# Patient Record
Sex: MALE | Race: WHITE | NOT HISPANIC OR LATINO | Employment: STUDENT | URBAN - METROPOLITAN AREA
[De-identification: names, ages, dates, MRNs, and addresses within clinical notes are randomized per-mention and may not be internally consistent; named-entity substitution may affect disease eponyms.]

---
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
## 2018-01-02 ENCOUNTER — TRANSFERRED RECORDS (OUTPATIENT)
Dept: HEALTH INFORMATION MANAGEMENT | Facility: CLINIC | Age: 18
End: 2018-01-02

## 2018-01-25 ENCOUNTER — OFFICE VISIT (OUTPATIENT)
Dept: INTERNAL MEDICINE | Facility: CLINIC | Age: 18
End: 2018-01-25
Payer: COMMERCIAL

## 2018-01-25 VITALS
DIASTOLIC BLOOD PRESSURE: 80 MMHG | BODY MASS INDEX: 31.4 KG/M2 | WEIGHT: 212 LBS | HEIGHT: 69 IN | RESPIRATION RATE: 18 BRPM | HEART RATE: 67 BPM | TEMPERATURE: 99.4 F | OXYGEN SATURATION: 99 % | SYSTOLIC BLOOD PRESSURE: 102 MMHG

## 2018-01-25 DIAGNOSIS — F32.1 MODERATE MAJOR DEPRESSION (H): Chronic | ICD-10-CM

## 2018-01-25 DIAGNOSIS — F90.9 ATTENTION DEFICIT HYPERACTIVITY DISORDER (ADHD), UNSPECIFIED ADHD TYPE: Primary | Chronic | ICD-10-CM

## 2018-01-25 PROCEDURE — 99214 OFFICE O/P EST MOD 30 MIN: CPT | Performed by: INTERNAL MEDICINE

## 2018-01-25 ASSESSMENT — PAIN SCALES - GENERAL: PAINLEVEL: NO PAIN (0)

## 2018-01-25 NOTE — PROGRESS NOTES
SUBJECTIVE:   Klebo J Skjervold David is a 18 year old male who presents to clinic today for the following health issues:    ADHD -- He stopped taking vyvanse two months ago as it was not providing significant relief for his diagnosis of attention deficit and hyperactivity disorder. His focus has actually improved he feels since discontinuing medication. He relates he has gained weight since he discontinued the vyvanse. Patient is not monitoring caloric intake. He does not exercise outside of frequent walking at work. He would like to hold off on consultation with mental health/psychiatry to discuss other medication at this time.     Wt Readings from Last 10 Encounters:   01/25/18 96.2 kg (212 lb)   12/14/17 94.1 kg (207 lb 7.3 oz) (94 %)*   11/21/17 90.3 kg (199 lb) (92 %)*   08/15/17 86.6 kg (191 lb) (89 %)*   05/26/17 84.4 kg (186 lb) (86 %)*   03/15/17 80.5 kg (177 lb 7.5 oz) (81 %)*   02/17/17 81.6 kg (180 lb) (83 %)*   01/13/17 84.4 kg (186 lb) (87 %)*   12/15/16 82.6 kg (182 lb 1.6 oz) (85 %)*   06/16/16 81.6 kg (180 lb) (85 %)*     * Growth percentiles are based on [facility] 2-20 Years data.     Depression -- he continues to take sertraline 50 mg. He still experiences difficulty sleeping. He intermittently takes melatonin as needed for sleep difficulties.     PHQ-9 SCORE 8/15/2017 11/2/2017 11/21/2017   Total Score - - -   Total Score 8 14 11       Problem list and histories reviewed & adjusted, as indicated.  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, ou (PQ)     Sleep disorder, circadian, delayed sleep phase type     Past Surgical History:   Procedure Laterality Date     CIRCUMCISION       EYE SURGERY  2008    Laser  retinas both eyes     frenulectomy       GENITOURINARY SURGERY  2001    Circumcision     H ARGON LASER FOR RETINAL TEAR      both eyes (PQ)     HERNIA REPAIR, INGUINAL RT/LT      Hernia Repair, Femoral RT/LT     PE TUBES       RETINAL REATTACHMENT       supranumery tooth extraction       TONSILLECTOMY & ADENOIDECTOMY         Social History   Substance Use Topics     Smoking status: Current Every Day Smoker     Packs/day: 1.00     Types: Cigarettes     Smokeless tobacco: Never Used     Alcohol use No     Family History   Problem Relation Age of Onset     Allergies Mother      Obesity Mother      Thyroid Disease Mother      DIABETES Mother      Hypertension Mother      Depression/Anxiety Mother      Anesthesia Reaction Mother      Thyroid Disease Mother      Asthma Mother      Known Genetic Syndrome Mother      Sticklers     Thyroid Disease Maternal Grandmother      Hypertension Maternal Grandmother      Thyroid Disease Maternal Grandmother      Known Genetic Syndrome Maternal Grandmother      Sticklers     Alzheimer Disease Paternal Grandfather      DIABETES Paternal Grandfather      Chemical Addiction Paternal Grandfather      DIABETES Father      CANCER Maternal Grandfather      DIABETES Other      Thyroid Disease Other      CEREBROVASCULAR DISEASE Other      CANCER Other      Glaucoma Other      Macular Degeneration Other      DIABETES Other      Prostate Cancer Other      CEREBROVASCULAR DISEASE Other      Thyroid Disease Other      Known Genetic Syndrome Other      Sticklers     CEREBROVASCULAR DISEASE Other      Known Genetic Syndrome Other      Sticklers         Current Outpatient Prescriptions   Medication Sig Dispense Refill     sertraline (ZOLOFT) 50 MG tablet Take 1 tablet (50 mg) by mouth daily 90 tablet 1     melatonin 5 MG tablet Take 5 mg by mouth nightly as needed for sleep       cetirizine HCl 10 MG CAPS Take 1 capsule by mouth daily as needed Needs to be seen b/4 further refills for allergies 90  "capsule 4     Labs reviewed in EPIC    Reviewed and updated as needed this visit by clinical staff  Tobacco  Meds  Med Hx  Surg Hx  Fam Hx  Soc Hx      Reviewed and updated as needed this visit by Provider         ROS:  Constitutional, HEENT, cardiovascular, pulmonary, GI, , musculoskeletal, neuro, skin, endocrine and psych systems are negative, except as otherwise noted.    This document serves as a record of the services and decisions personally performed and made by Enrike Neal MD. It was created on his behalf by Cindy Watson, a trained medical scribe. The creation of this document is based on the provider's statements to the medical scribe.  Cindy Watson January 25, 2018 3:28 PM       OBJECTIVE:     /80 (BP Location: Left arm, Cuff Size: Adult Regular)  Pulse 67  Temp 99.4  F (37.4  C) (Oral)  Resp 18  Ht 1.74 m (5' 8.5\")  Wt 96.2 kg (212 lb)  SpO2 99%  BMI 31.77 kg/m2  Body mass index is 31.77 kg/(m^2).  GENERAL: healthy, alert and no distress, normal grooming and affect, answers all questions appropriately , talkative and appropriate   EYES: Eyes grossly normal to inspection, PERRL and conjunctivae and sclerae normal  NEURO: Normal strength and tone, mentation intact and speech normal  PSYCH: mentation appears normal, affect normal/bright    ASSESSMENT/PLAN:   BMI:   Estimated body mass index is 31.77 kg/(m^2) as calculated from the following:    Height as of this encounter: 1.74 m (5' 8.5\").    Weight as of this encounter: 96.2 kg (212 lb).   Weight management plan: Discussed healthy diet and exercise guidelines and patient will follow up in 3 months in clinic to re-evaluate.    (F90.9) Attention deficit hyperactivity disorder (ADHD), unspecified ADHD type  (primary encounter diagnosis)  Comment: stable without need for vyvanse. Focus and concentration have better managed since discontinuing vyvanse 1 month ago. He felt prior to discontinuation he was hyper focused. However, " he has gradually gained weight since. Discussed healthy diet and exercise. Consider dietician referral if he desires in the future.   Plan: Consider referral for psychiatry for further evaluation and discussion of other medication options, patient is not interested at this time     (F32.1) Moderate major depression (H)  Comment: Controlled. He continues to experience difficulty sleeping, recommended Melatonin and benadryl for relief.   Plan: Continue sertraline.     The information in this document, created by the medical scribe for me, accurately reflects the services I personally performed and the decisions made by me. I have reviewed and approved this document for accuracy prior to leaving the patient care area.  January 25, 2018 3:51 PM    Enrike Neal MD  [facility]

## 2018-01-25 NOTE — PATIENT INSTRUCTIONS
Melatonin between 3 to 5 milligrams one hour before sleep   And  Benadryl 25 to 50 milligrams one hour before sleep     [facility]    If you have any questions regarding to your visit please contact your care team:     Team Pink:   Clinic Hours Telephone Number   Internal Medicine:  Dr. Liya Garcia, NP       7am-7pm  Monday - Thursday   7am-5pm  Fridays  [phone]  (Appointment scheduling available 24/7)    Questions about your visit?  Team Line  [phone]   Urgent Care - [place] - 11am-9pm Monday-Friday Saturday-Sunday- 9am-5pm   [place] - 5pm-9pm Monday-Friday Saturday-Sunday- 9am-5pm  [phone] - [place]   [phone] - [place]       What options do I have for visits at the clinic other than the traditional office visit?  To expand how we care for you, many of our providers are utilizing electronic visits (e-visits) and telephone visits, when medically appropriate, for interactions with their patients rather than a visit in the clinic.   We also offer nurse visits for many medical concerns. Just like any other service, we will bill your insurance company for this type of visit based on time spent on the phone with your provider. Not all insurance companies cover these visits. Please check with your medical insurance if this type of visit is covered. You will be responsible for any charges that are not paid by your insurance.      E-visits via [employer]:  generally incur a $35.00 fee.  Telephone visits:  Time spent on the phone: *charged based on time that is spent on the phone in increments of 10 minutes. Estimated cost:   5-10 mins $30.00   11-20 mins. $59.00   21-30 mins. $85.00   Use [employer] (secure email communication and access to your chart) to send your primary care provider a message or make an appointment. Ask someone on your Team how to sign up for [employer].    For a Price Quote for your services, please  call our Consumer Price Line at [phone].    As always, Thank you for trusting us with your health care needs    Donald Iqbal

## 2018-01-25 NOTE — NURSING NOTE
"Chief Complaint   Patient presents with     Recheck Medication     Health Maintenance     Eye Exam       Initial /80 (BP Location: Left arm, Cuff Size: Adult Regular)  Pulse 67  Temp 99.4  F (37.4  C) (Oral)  Resp 18  Ht 5' 8.5\" (1.74 m)  Wt 212 lb (96.2 kg)  SpO2 99%  BMI 31.77 kg/m2 Estimated body mass index is 31.77 kg/(m^2) as calculated from the following:    Height as of this encounter: 5' 8.5\" (1.74 m).    Weight as of this encounter: 212 lb (96.2 kg).  Medication Reconciliation: complete       Sangeetha Magallon CMA    "

## 2018-02-23 ENCOUNTER — TELEPHONE (OUTPATIENT)
Dept: INTERNAL MEDICINE | Facility: CLINIC | Age: 18
End: 2018-02-23

## 2018-02-23 NOTE — TELEPHONE ENCOUNTER
Reason for Call:  Other call back    Detailed comments:  Patient calling with a cough. Lower chest pain. Please call and advise.     Phone Number Patient can be reached at: Home number on file [phone] (home)    Best Time:  Any     Can we leave a detailed message on this number? YES    Call taken on 2/23/2018 at 10:41 AM by Jalyn Martin

## 2018-02-23 NOTE — TELEPHONE ENCOUNTER
Spoke with pt and he sounded like he was in pain. He put his Mother on the line. Consent to communicate is on file. Symptoms started around Sunday and Monday and has progressively gotten worse. Today had a cough and trouble walking up the stairs. No fever. No wheezing. Can breathe, but hurts to take a full, deep breath. Short, shallow breath. Seems like he is working hard to breathe. Looks paler than normal. Scheduled an appt at 1130 with Val, then realized he was double booked. Huddled with Dr. Neal and he would like pt to go to ER as he may need a CT scan and this cannot be done here. If he comes here, he would just get sent to ER. Mother will bring pt to the ER.    Sandee Shin RN  [facility]

## 2018-05-15 ENCOUNTER — TELEPHONE (OUTPATIENT)
Dept: FAMILY MEDICINE | Facility: CLINIC | Age: 18
End: 2018-05-15

## 2018-05-15 ENCOUNTER — MYC MEDICAL ADVICE (OUTPATIENT)
Dept: INTERNAL MEDICINE | Facility: CLINIC | Age: 18
End: 2018-05-15

## 2018-05-30 ENCOUNTER — ALLIED HEALTH/NURSE VISIT (OUTPATIENT)
Dept: NURSING | Facility: CLINIC | Age: 18
End: 2018-05-30
Payer: COMMERCIAL

## 2018-05-30 DIAGNOSIS — Z00.00 PREVENTATIVE HEALTH CARE: Primary | ICD-10-CM

## 2018-05-30 PROCEDURE — 90651 9VHPV VACCINE 2/3 DOSE IM: CPT

## 2018-05-30 PROCEDURE — 90471 IMMUNIZATION ADMIN: CPT

## 2018-05-30 PROCEDURE — 99207 ZZC NO CHARGE LOS: CPT

## 2018-05-30 NOTE — NURSING NOTE
Prior to injection verified patient identity using patient's name and date of birth.  Due to injection administration, patient instructed to remain in clinic for 15 minutes  afterwards, and to report any adverse reaction to me immediately.    Screening Questionnaire for Adult Immunization    Are you sick today?   No   Do you have allergies to medications, food, a vaccine component or latex?   No   Have you ever had a serious reaction after receiving a vaccination?   No   Do you have a long-term health problem with heart disease, lung disease, asthma, kidney disease, metabolic disease (e.g. diabetes), anemia, or other blood disorder?   No   Do you have cancer, leukemia, HIV/AIDS, or any other immune system problem?   No   In the past 3 months, have you taken medications that affect  your immune system, such as prednisone, other steroids, or anticancer drugs; drugs for the treatment of rheumatoid arthritis, Crohn s disease, or psoriasis; or have you had radiation treatments?   No   Have you had a seizure, or a brain or other nervous system problem?   No   During the past year, have you received a transfusion of blood or blood     products, or been given immune (gamma) globulin or antiviral drug?   No   For women: Are you pregnant or is there a chance you could become        pregnant during the next month?   No   Have you received any vaccinations in the past 4 weeks?   No     Immunization questionnaire answers were all negative.        Per orders of Dr. Neal, injection of Gardasil 9 given by Sandee Grayson. Patient instructed to remain in clinic for 15 minutes afterwards, and to report any adverse reaction to me immediately.       Screening performed by Sandee Grayson on 5/30/2018 at 4:52 PM.

## 2018-05-30 NOTE — MR AVS SNAPSHOT
After Visit Summary   5/30/2018    Klebo J Skjervold David    MRN: [id number]           Patient Information     Date Of Birth          2000        Visit Information        Provider Department      5/30/2018 4:45 PM [facility]Z ANCILLARY [facility]        Today's Diagnoses     Preventative health care    -  1       Follow-ups after your visit        Your next 10 appointments already scheduled     Dec 13, 2018  3:20 PM CST   Return Visit with Aaron Pagan MD   [facility] ([facility]    [address] [place] [id number] [phone]              Who to contact     If you have questions or need follow up information about today's clinic visit or your schedule please contact [facility] directly at [phone].  Normal or non-critical lab and imaging results will be communicated to you by MyChart, letter or phone within 4 business days after the clinic has received the results. If you do not hear from us within 7 days, please contact the clinic through MyChart or phone. If you have a critical or abnormal lab result, we will notify you by phone as soon as possible.  Submit refill requests through [employer] or call your pharmacy and they will forward the refill request to us. Please allow 3 business days for your refill to be completed.          Additional Information About Your Visit        MyChart Information     [employer] gives you secure access to your electronic health record. If you see a primary care provider, you can also send messages to your care team and make appointments. If you have questions, please call your primary care clinic.  If you do not have a primary care provider, please call [phone] and they will assist you.        Care EveryWhere ID     This is your Care EveryWhere ID. This could be used by other organizations to access your [place] medical records  [id number]         Blood Pressure from Last 3  Encounters:   01/25/18 102/80   12/14/17 115/60   11/21/17 132/72    Weight from Last 3 Encounters:   01/25/18 212 lb (96.2 kg)   12/14/17 207 lb 7.3 oz (94.1 kg) (94 %)*   11/21/17 199 lb (90.3 kg) (92 %)*     * Growth percentiles are based on [facility] 2-20 Years data.              We Performed the Following     ADMIN 1st VACCINE     HUMAN PAPILLOMA VIRUS (GARDASIL 9) VACCINE        Primary Care Provider Office Phone # Fax #    Enrike Neal [phone] [address] [facility] [address]        Equal Access to Services     JUDIE RAE : Zac Agosto, veronica lara, ijeoma kaalmahaleigh bird, celeste palacio . So [facility] [phone].    ATENCIÓN: Si habla español, tiene a bradford disposición servicios gratuitos de asistencia lingüística. Llame al [phone].    We comply with applicable federal civil rights laws and [place] laws. We do not discriminate on the basis of race, color, national origin, age, disability, sex, sexual orientation, or gender identity.            Thank you!     Thank you for choosing [facility]  for your care. Our goal is always to provide you with excellent care. Hearing back from our patients is one way we can continue to improve our services. Please take a few minutes to complete the written survey that you may receive in the mail after your visit with us. Thank you!             Your Updated Medication List - Protect others around you: Learn how to safely use, store and throw away your medicines at www.disposemymeds.org.          This list is accurate as of 5/30/18  4:57 PM.  Always use your most recent med list.                   Brand Name Dispense Instructions for use Diagnosis    cetirizine HCl 10 MG Caps     90 capsule    Take 1 capsule by mouth daily as needed Needs to be seen b/4 further refills for allergies    Seasonal allergic rhinitis       melatonin 5 MG tablet      Take 5 mg by mouth nightly as  needed for sleep        sertraline 50 MG tablet    ZOLOFT    90 tablet    Take 1 tablet (50 mg) by mouth daily    Major depressive disorder, recurrent episode, moderate (H)

## 2018-06-14 NOTE — TELEPHONE ENCOUNTER
Bal Brooke, I m Benita Valdes. I work with Dr. Neal at [facility]. He/she noticed in your chart that you are due for a patient health questionnaire. We would like to complete that today as Dr. Neal cares about your health.        Called patient; Called patient, completed PHQ9. PHQ9 score: 2. (If patient has sucidal thoughts discuss with Team RN ASAP) <5 PASS, >5 FAIL Needs follow up appointment.  If patient refuses appointment please ask them if they would be willing to speak to the Team RN for further support over the phone.     If PHQ-9 is less than 5, close encounter. If PHQ-9 is 5 or greater, recommend that patient schedule follow up appointment with primary provider (in office, e-visit or phone visit) for medication follow up and evaluation. If positive suicidal thoughts, huddle with RN or provider today and route encounter.     Appointment Made? No    Benita Valdes

## 2018-06-15 ASSESSMENT — PATIENT HEALTH QUESTIONNAIRE - PHQ9: SUM OF ALL RESPONSES TO PHQ QUESTIONS 1-9: 2

## 2018-08-26 DIAGNOSIS — F33.1 MAJOR DEPRESSIVE DISORDER, RECURRENT EPISODE, MODERATE (H): Chronic | ICD-10-CM

## 2018-08-27 NOTE — TELEPHONE ENCOUNTER
PHQ-9 SCORE 11/2/2017 11/21/2017 6/14/2018   Total Score - - -   Total Score 14 11 2     Prescription approved per [facility] Refill Protocol.  Piper Santiago RN

## 2018-09-17 ENCOUNTER — OFFICE VISIT (OUTPATIENT)
Dept: FAMILY MEDICINE | Facility: CLINIC | Age: 18
End: 2018-09-17
Payer: COMMERCIAL

## 2018-09-17 VITALS
TEMPERATURE: 97.5 F | HEART RATE: 82 BPM | RESPIRATION RATE: 16 BRPM | BODY MASS INDEX: 35.66 KG/M2 | DIASTOLIC BLOOD PRESSURE: 64 MMHG | OXYGEN SATURATION: 98 % | SYSTOLIC BLOOD PRESSURE: 112 MMHG | WEIGHT: 238 LBS

## 2018-09-17 DIAGNOSIS — F32.1 MODERATE MAJOR DEPRESSION (H): Chronic | ICD-10-CM

## 2018-09-17 DIAGNOSIS — F90.9 ATTENTION DEFICIT HYPERACTIVITY DISORDER (ADHD), UNSPECIFIED ADHD TYPE: Chronic | ICD-10-CM

## 2018-09-17 DIAGNOSIS — F41.1 GAD (GENERALIZED ANXIETY DISORDER): ICD-10-CM

## 2018-09-17 DIAGNOSIS — Z11.4 SCREENING FOR HUMAN IMMUNODEFICIENCY VIRUS: ICD-10-CM

## 2018-09-17 DIAGNOSIS — Z13.29 SCREENING FOR HYPOTHYROIDISM: ICD-10-CM

## 2018-09-17 DIAGNOSIS — R63.5 WEIGHT GAIN: Primary | ICD-10-CM

## 2018-09-17 LAB
ANION GAP SERPL CALCULATED.3IONS-SCNC: 11 MMOL/L (ref 3–14)
BUN SERPL-MCNC: 11 MG/DL (ref 7–30)
CALCIUM SERPL-MCNC: 9.4 MG/DL (ref 8.5–10.1)
CHLORIDE SERPL-SCNC: 105 MMOL/L (ref 94–109)
CO2 SERPL-SCNC: 22 MMOL/L (ref 20–32)
CREAT SERPL-MCNC: 0.82 MG/DL (ref 0.66–1.25)
GFR SERPL CREATININE-BSD FRML MDRD: >90 ML/MIN/1.7M2
GLUCOSE SERPL-MCNC: 89 MG/DL (ref 70–99)
POTASSIUM SERPL-SCNC: 4 MMOL/L (ref 3.4–5.3)
SODIUM SERPL-SCNC: 138 MMOL/L (ref 133–144)
TSH SERPL DL<=0.005 MIU/L-ACNC: 1.24 MU/L (ref 0.4–4)

## 2018-09-17 PROCEDURE — 87389 HIV-1 AG W/HIV-1&-2 AB AG IA: CPT | Performed by: INTERNAL MEDICINE

## 2018-09-17 PROCEDURE — 82306 VITAMIN D 25 HYDROXY: CPT | Performed by: INTERNAL MEDICINE

## 2018-09-17 PROCEDURE — 80048 BASIC METABOLIC PNL TOTAL CA: CPT | Performed by: INTERNAL MEDICINE

## 2018-09-17 PROCEDURE — 36415 COLL VENOUS BLD VENIPUNCTURE: CPT | Performed by: INTERNAL MEDICINE

## 2018-09-17 PROCEDURE — 99214 OFFICE O/P EST MOD 30 MIN: CPT | Performed by: INTERNAL MEDICINE

## 2018-09-17 PROCEDURE — 84443 ASSAY THYROID STIM HORMONE: CPT | Performed by: INTERNAL MEDICINE

## 2018-09-17 RX ORDER — LISDEXAMFETAMINE DIMESYLATE 60 MG/1
CAPSULE ORAL
Qty: 30 CAPSULE | Refills: 0 | Status: SHIPPED | OUTPATIENT
Start: 2018-09-17 | End: 2018-09-17

## 2018-09-17 RX ORDER — LISDEXAMFETAMINE DIMESYLATE 60 MG/1
CAPSULE ORAL
Refills: 0 | COMMUNITY
Start: 2017-10-10 | End: 2018-09-17

## 2018-09-17 RX ORDER — LISDEXAMFETAMINE DIMESYLATE 60 MG/1
CAPSULE ORAL
Qty: 30 CAPSULE | Refills: 0 | Status: SHIPPED | OUTPATIENT
Start: 2018-09-17 | End: 2019-01-14

## 2018-09-17 NOTE — MR AVS SNAPSHOT
After Visit Summary   9/17/2018    Klebo J Skjervold David    MRN: [id number]           Patient Information     Date Of Birth          2000        Visit Information        Provider Department      9/17/2018 2:30 PM Enrike Neal MD [facility]        Today's Diagnoses     Weight gain    -  1    Attention deficit hyperactivity disorder (ADHD), unspecified ADHD type        Moderate major depression (H)        PETRA (generalized anxiety disorder)        Screening for hypothyroidism        Screening for human immunodeficiency virus          Care Instructions    [facility]    If you have any questions regarding to your visit please contact your care team:     Team Pink:   Clinic Hours Telephone Number   Internal Medicine:  Dr. Liya Garcia NP 7am-7pm  Monday - Thursday   7am-5pm  Fridays  [phone]  (Appointment scheduling available 24/7)   Urgent Care - [place] - 11am-9pm Monday-Friday Saturday-Sunday- 9am-5pm   [place] - 5pm-9pm Monday-Friday Saturday-Sunday- 9am-5pm  [phone] - [place]  [phone] - [place]       What options do I have for a visit other than an office visit? We offer electronic visits (e-visits) and telephone visits, when medically appropriate.  Please check with your medical insurance to see if these types of visits are covered, as you will be responsible for any charges that are not paid by your insurance.      You can use MobileAware (secure electronic communication) to access to your chart, send your primary care provider a message, or make an appointment. Ask a team member how to get started.     For a price quote for your services, please call our Consumer Price Line at [phone] or our Imaging Cost estimation line at [phone] (for imaging tests).  Benita DENNY CMA ([facility])            Follow-ups after your visit        Your next 10 appointments already scheduled      Dec 13, 2018  3:20 PM CST   Return Visit with Aaron Pagan MD   [facility] ([facility])    [address] [place] [id number] [phone]              Who to contact     If you have questions or need follow up information about today's clinic visit or your schedule please contact [facility] directly at [phone].  Normal or non-critical lab and imaging results will be communicated to you by Wheelyhart, letter or phone within 4 business days after the clinic has received the results. If you do not hear from us within 7 days, please contact the clinic through R17t or phone. If you have a critical or abnormal lab result, we will notify you by phone as soon as possible.  Submit refill requests through Mirriad or call your pharmacy and they will forward the refill request to us. Please allow 3 business days for your refill to be completed.          Additional Information About Your Visit        WheelyharLynk Information     Mirriad gives you secure access to your electronic health record. If you see a primary care provider, you can also send messages to your care team and make appointments. If you have questions, please call your primary care clinic.  If you do not have a primary care provider, please call [phone] and they will assist you.        Care EveryWhere ID     This is your Care EveryWhere ID. This could be used by other organizations to access your [place] medical records  [id number]        Your Vitals Were     Pulse Temperature Respirations Pulse Oximetry BMI (Body Mass Index)       82 97.5  F (36.4  C) (Oral) 16 98% 35.66 kg/m2        Blood Pressure from Last 3 Encounters:   09/17/18 112/64   01/25/18 102/80   12/14/17 115/60    Weight from Last 3 Encounters:   09/17/18 238 lb (108 kg)   01/25/18 212 lb (96.2 kg)   12/14/17 207 lb 7.3 oz (94.1 kg) (94 %)*     * Growth percentiles are based on CDC 2-20 Years data.              We Performed  the Following     Basic metabolic panel     HIV Screening     TSH with free T4 reflex     Vitamin D Deficiency          Today's Medication Changes          These changes are accurate as of 9/17/18  2:51 PM.  If you have any questions, ask your nurse or doctor.               Start taking these medicines.        Dose/Directions    VYVANSE 60 MG capsule   Used for:  Attention deficit hyperactivity disorder (ADHD), unspecified ADHD type   Generic drug:  lisdexamfetamine   Started by:  Enrike Neal MD        TAKE 1 CAPSULE BY MOUTH EVERY MORNING. MUST BE 28 DAYS BETWEEN PRESCRIPTIONS   Quantity:  30 capsule   Refills:  0            Where to get your medicines      Some of these will need a paper prescription and others can be bought over the counter.  Ask your nurse if you have questions.     Bring a paper prescription for each of these medications     VYVANSE 60 MG capsule                Primary Care Provider Office Phone # Fax #    Enrike Neal [phone] [address] [facility] [address]        Equal Access to Services     [facility]: Hadii shiela barnard hadasho Soomaali, waaxda luqadaha, qaybta kaalmada adeegyada, waxay glory palacio . So [facility] [phone].    ATENCIÓN: Si habla español, tiene a bradford disposición servicios gratuitos de asistencia lingüística. Llame al [phone].    We comply with applicable federal civil rights laws and [place] laws. We do not discriminate on the basis of race, color, national origin, age, disability, sex, sexual orientation, or gender identity.            Thank you!     Thank you for choosing [facility]  for your care. Our goal is always to provide you with excellent care. Hearing back from our patients is one way we can continue to improve our services. Please take a few minutes to complete the written survey that you may receive in the mail after your visit with us. Thank you!             Your Updated Medication  List - Protect others around you: Learn how to safely use, store and throw away your medicines at www.disposemymeds.org.          This list is accurate as of 9/17/18  2:51 PM.  Always use your most recent med list.                   Brand Name Dispense Instructions for use Diagnosis    cetirizine HCl 10 MG Caps     90 capsule    Take 1 capsule by mouth daily as needed Needs to be seen b/4 further refills for allergies    Seasonal allergic rhinitis       melatonin 5 MG tablet      Take 5 mg by mouth nightly as needed for sleep        sertraline 50 MG tablet    ZOLOFT    90 tablet    TAKE 1 TABLET BY MOUTH EVERY DAY    Major depressive disorder, recurrent episode, moderate (H)       VYVANSE 60 MG capsule   Generic drug:  lisdexamfetamine     30 capsule    TAKE 1 CAPSULE BY MOUTH EVERY MORNING. MUST BE 28 DAYS BETWEEN PRESCRIPTIONS    Attention deficit hyperactivity disorder (ADHD), unspecified ADHD type

## 2018-09-17 NOTE — PROGRESS NOTES
SUBJECTIVE:   Klebo J Skjervold David is a 18 year old male who presents to clinic today for the following health issues:    Depression and Anxiety with associated Weight Gain  Mendy states that overall things are pretty decent for him. He stopped using Vyvanse as of November of last year since his therapist suggested seeing how he would do without it. Over the last year however he has gained roughly 50 pounds and now has a body mass index past 35 !. He reports that he has difficulty falling asleep and then trouble getting up in the mornings [ question possibly obstructive sleep apnea?]. He has been working some odd jobs and has not had consistency in work. He is curious if whether treatment of medical marijuana will benefit him in feeling more calm, but on closer questioning he simply does not have a current qualifying condition for medical cannabis.     Other concerns  He reports no significant health issues since last visit. He believes that his therapist has been a good connection for him. He states that Thyroid disease is a longstanding family issue and is noted in his family history. He requested some screening  Laboratory studies , see orders section of this encounter     Problem list and histories reviewed & adjusted, as indicated.  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, ou (PQ)     Sleep disorder, circadian, delayed sleep phase type     Past Surgical History:   Procedure Laterality Date     CIRCUMCISION       EYE SURGERY  2008    Laser retinas both eyes     frenulectomy       GENITOURINARY SURGERY  2001    Circumcision     H ARGON LASER FOR RETINAL TEAR      both eyes (PQ)     HERNIA REPAIR, INGUINAL RT/LT       Hernia Repair, Femoral RT/LT     PE TUBES       RETINAL REATTACHMENT       supranumery tooth extraction       TONSILLECTOMY & ADENOIDECTOMY         Social History   Substance Use Topics     Smoking status: Current Every Day Smoker     Packs/day: 1.00     Types: Cigarettes     Smokeless tobacco: Never Used     Alcohol use No     Family History   Problem Relation Age of Onset     Allergies Mother      Obesity Mother      Thyroid Disease Mother      Diabetes Mother      Hypertension Mother      Depression/Anxiety Mother      Anesthesia Reaction Mother      Thyroid Disease Mother      Asthma Mother      Known Genetic Syndrome Mother      Sticklers     Thyroid Disease Maternal Grandmother      Hypertension Maternal Grandmother      Thyroid Disease Maternal Grandmother      Known Genetic Syndrome Maternal Grandmother      Sticklers     Alzheimer Disease Paternal Grandfather      Diabetes Paternal Grandfather      Chemical Addiction Paternal Grandfather      Diabetes Father      Cancer Maternal Grandfather      Diabetes Other      Thyroid Disease Other      Cerebrovascular Disease Other      Cancer Other      Glaucoma Other      Macular Degeneration Other      Diabetes Other      Prostate Cancer Other      Cerebrovascular Disease Other      Thyroid Disease Other      Known Genetic Syndrome Other      Sticklers     Cerebrovascular Disease Other      Known Genetic Syndrome Other      Sticklers         BP Readings from Last 3 Encounters:   09/17/18 112/64   01/25/18 102/80   12/14/17 115/60    Wt Readings from Last 3 Encounters:   09/17/18 108 kg (238 lb)   01/25/18 96.2 kg (212 lb)   12/14/17 94.1 kg (207 lb 7.3 oz) (94 %)*     * Growth percentiles are based on CDC 2-20 Years data.         Reviewed and updated as needed this visit by clinical staff       Reviewed and updated as needed this visit by Provider       ROS:  Constitutional, HEENT, cardiovascular, pulmonary, GI, , musculoskeletal, neuro, skin, endocrine and psych  systems are negative, except as otherwise noted.    This document serves as a record of the services and decisions personally performed and made by Enrike Neal MD. It was created on his behalf by Solomon Ty, a trained medical scribe. The creation of this document is based on the provider's statements to the medical scribe.  Solomon Ty 2:31 PM September 17, 2018    OBJECTIVE:     /64  Pulse 82  Temp 97.5  F (36.4  C) (Oral)  Resp 16  Wt 108 kg (238 lb)  SpO2 98%  BMI 35.66 kg/m2  Body mass index is 35.66 kg/(m^2).  GENERAL: healthy, alert and no distress.  EYES: Eyes grossly normal to inspection, PERRL and conjunctivae and sclerae normal  SKIN: no suspicious lesions or rashes  NEURO: Normal strength and tone, mentation intact and speech normal  PSYCH: mentation appears normal, affect normal/bright    Diagnostic Test Results:  No results found for this or any previous visit (from the past 24 hour(s)).    ASSESSMENT/PLAN:     (R63.5) Weight gain  (primary encounter diagnosis)  Comment: a significant amount. Not doing as well. Will try restart lisdexamfetamine (VYVANSE) but our trigger for going to the psychiatrist  Is getting closer and closer .  Plan: Vitamin D Deficiency, Basic metabolic panel,         TSH with free T4 reflex            (F90.9) Attention deficit hyperactivity disorder (ADHD), unspecified ADHD type  Comment: restarted medication . 3 month prescriptions given  Plan: Basic metabolic panel, TSH with free T4 reflex,        VYVANSE 60 MG capsule, DISCONTINUED: VYVANSE 60        MG capsule, DISCONTINUED: VYVANSE 60 MG capsule            (F32.1) Moderate major depression (H)  Comment: continue plan of care with counseling psychologist  And selective serotonin reuptake inhibitors (SSRIs)    Plan: Basic metabolic panel, TSH with free T4 reflex            (F41.1) PETRA (generalized anxiety disorder)  Comment: an apparently new diagnosis. I entered this diagnosis today based on patients  report of his diagnoses from his counseling psychologist  Although I did not see those medical records. He also scored highly today on the PETRA-7 anxiety scale and PHQ9 depression survey   Plan: Vitamin D Deficiency, Basic metabolic panel,         TSH with free T4 reflex            (Z13.29) Screening for hypothyroidism  Comment: check TSH ( thyroid test )   Plan: TSH ( thyroid test )       (Z11.4) Screening for human immunodeficiency virus  Comment: routine screening   Plan: HIV Screening              See Patient Instructions    The information in this document, created by the medical scribe for me, accurately reflects the services I personally performed and the decisions made by me. I have reviewed and approved this document for accuracy prior to leaving the patient care area.  September 17, 2018 2:31 PM    Enrike Neal MD  [facility]

## 2018-09-17 NOTE — PATIENT INSTRUCTIONS
[facility]    If you have any questions regarding to your visit please contact your care team:     Team Pink:   Clinic Hours Telephone Number   Internal Medicine:  Dr. Liya Garcia NP 7am-7pm  Monday - Thursday   7am-5pm  Fridays  [phone]  (Appointment scheduling available 24/7)   Urgent Care - [place] and [facility] - 11am-9pm Monday-Friday Saturday-Sunday- 9am-5pm   [place] - 5pm-9pm Monday-Friday Saturday-Sunday- 9am-5pm  [phone] - [place]  [phone] - [place]       What options do I have for a visit other than an office visit? We offer electronic visits (e-visits) and telephone visits, when medically appropriate.  Please check with your medical insurance to see if these types of visits are covered, as you will be responsible for any charges that are not paid by your insurance.      You can use AlphaSights (secure electronic communication) to access to your chart, send your primary care provider a message, or make an appointment. Ask a team member how to get started.     For a price quote for your services, please call our Consumer Price Line at [phone] or our Imaging Cost estimation line at [phone] (for imaging tests).  Benita DENNY CMA ([facility])

## 2018-09-18 LAB
DEPRECATED CALCIDIOL+CALCIFEROL SERPL-MC: 24 UG/L (ref 20–75)
HIV 1+2 AB+HIV1 P24 AG SERPL QL IA: NONREACTIVE

## 2018-11-10 DIAGNOSIS — F33.1 MAJOR DEPRESSIVE DISORDER, RECURRENT EPISODE, MODERATE (H): Chronic | ICD-10-CM

## 2018-11-12 NOTE — TELEPHONE ENCOUNTER
Signed Prescriptions:                        Disp   Refills    sertraline (ZOLOFT) 50 MG tablet           90 tab*1        Sig: TAKE 1 TABLET BY MOUTH EVERY DAY  Authorizing Provider: SKYLER SHARP  Ordering User: NIKO BOLTON RN refilled medication per [facility] Refill Protocol- patient's last PHQ-9 score of 2 on 6/14/2018, LOV 9/2018.     Niko Bolton RN

## 2018-12-03 ENCOUNTER — TELEPHONE (OUTPATIENT)
Dept: CARDIOLOGY | Facility: CLINIC | Age: 18
End: 2018-12-03

## 2018-12-03 DIAGNOSIS — R94.31 ABNORMAL ELECTROCARDIOGRAM: Primary | ICD-10-CM

## 2018-12-03 NOTE — TELEPHONE ENCOUNTER
Order placed for echocardiogram. Per Dr. Pagan, Mendy is to follow-up with both an electrocardiogram and echocardiogram. Scheduled for same day at 3:00 p.m.

## 2018-12-13 ENCOUNTER — ANCILLARY PROCEDURE (OUTPATIENT)
Dept: CARDIOLOGY | Facility: CLINIC | Age: 18
End: 2018-12-13
Attending: PEDIATRICS
Payer: COMMERCIAL

## 2018-12-13 ENCOUNTER — OFFICE VISIT (OUTPATIENT)
Dept: CARDIOLOGY | Facility: CLINIC | Age: 18
End: 2018-12-13
Payer: COMMERCIAL

## 2018-12-13 VITALS
OXYGEN SATURATION: 97 % | BODY MASS INDEX: 33.77 KG/M2 | DIASTOLIC BLOOD PRESSURE: 74 MMHG | HEIGHT: 69 IN | HEART RATE: 95 BPM | SYSTOLIC BLOOD PRESSURE: 121 MMHG | WEIGHT: 228 LBS

## 2018-12-13 DIAGNOSIS — R94.31 ABNORMAL ELECTROCARDIOGRAM: Primary | ICD-10-CM

## 2018-12-13 DIAGNOSIS — I44.0 FIRST DEGREE HEART BLOCK: ICD-10-CM

## 2018-12-13 DIAGNOSIS — R94.31 ABNORMAL ELECTROCARDIOGRAM: ICD-10-CM

## 2018-12-13 PROCEDURE — 93303 ECHO TRANSTHORACIC: CPT | Performed by: PEDIATRICS

## 2018-12-13 PROCEDURE — 93325 DOPPLER ECHO COLOR FLOW MAPG: CPT | Performed by: PEDIATRICS

## 2018-12-13 PROCEDURE — 93000 ELECTROCARDIOGRAM COMPLETE: CPT | Performed by: PEDIATRICS

## 2018-12-13 PROCEDURE — 99213 OFFICE O/P EST LOW 20 MIN: CPT | Mod: 25 | Performed by: PEDIATRICS

## 2018-12-13 PROCEDURE — 93320 DOPPLER ECHO COMPLETE: CPT | Performed by: PEDIATRICS

## 2018-12-13 RX ORDER — MULTIVIT-MIN/IRON/FOLIC ACID/K 18-600-40
1 CAPSULE ORAL DAILY
COMMUNITY
End: 2020-11-02

## 2018-12-13 ASSESSMENT — MIFFLIN-ST. JEOR: SCORE: 2029.83

## 2018-12-13 NOTE — NURSING NOTE
"Klebo J Skjervold David's goals for this visit include: Annual f/u murmur  / abnormal ECG  He requests these members of his care team be copied on today's visit information: yes    PCP: Enrike Neal    Referring Provider:  Enrike Neal MD  [address] [place] [address]    /74 (BP Location: Right arm, Patient Position: Sitting, Cuff Size: Adult Large)   Pulse 95   Ht 1.745 m (5' 8.7\")   Wt 103.4 kg (228 lb)   SpO2 97%   BMI 33.96 kg/m        "

## 2018-12-13 NOTE — LETTER
"  2018      RE: Klebo J Skjervold David  [address]  [place] [address]                                                      PEDS Cardiac Consult Letter  Date: 2018      Enrike Neal MD  [address] [place] [address]      PATIENT: Klebo J Skjervold David  :          2000   ELIANE:          2018    Dear Dr. Neal:    Mendy is 18 years old and was seen at the [facility] on 2018.   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  He had stopped his Vyvanse, but plans to restart it in order to reenter school.  He is relatively sedentary but asymptomatic.  He smokes a half a pack of cigarettes a day.  He has not experienced chest pain, syncope or palpitations.    On physical examination his height was 1.745 m (5' 8.7\") and his weight was 103.4 kg (228 lb).  His heart rate was 95  and respirations Data Unavailable per minute.  The blood pressure in his right arm was 121/74.  He was acyanotic, warm and well perfused. He was alert cooperative and in no distress.  His lungs were clear to auscultation without respiratory distress.  He had a regular rhythm with no murmur.  The second heart sound was physiologically split with a normal pulmonary component.   There was no organomegaly or abdominal tenderness.  Peripheral pulses were 2+ and equal in all extremities.  There was no clubbing or edema.    An echocardiogram performed today that I personally reviewed and explained to him and his mother was normal with a normal aortic root size and no mitral valve prolapse.  An electrocardiogram performed today that I personally reviewed and explained to him and his mother continued to show borderline first-degree heart block with a TX interval 214 ms and was otherwise normal.    Mendy has no evidence of significant cardiac " involvement from his Stickler syndrome.  Despite first-degree heart block he is asymptomatic.  He does not need any restriction of his activities.  I encouraged him to stop smoking.  I would like to see him in follow-up in 1 year with an electrocardiogram.    Thank you very much for your confidence in allowing me to participate in Mendy's care.  If you have any questions or concerns, please don't hesitate to contact me.    Sincerely,      Aaron Pagan M.D.   Pediatric Cardiology   [facility]  Pediatric Specialty Clinic  [phone]    Note: Chart documentation done in part with Dragon Voice Recognition software. Although reviewed after completion, some word and grammatical errors may remain.     Aaron Pagan MD

## 2018-12-13 NOTE — PROGRESS NOTES
"                                               PEDS Cardiac Consult Letter  Date: 2018      Enrike Neal MD  [address] [place] [address]      PATIENT: Klebo J Skjervold David  :          2000   ELIANE:          2018    Dear Dr. Neal:    Mendy is 18 years old and was seen at the [facility] on 2018.   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  He had stopped his Vyvanse, but plans to restart it in order to reenter school.  He is relatively sedentary but asymptomatic.  He smokes a half a pack of cigarettes a day.  He has not experienced chest pain, syncope or palpitations.    On physical examination his height was 1.745 m (5' 8.7\") and his weight was 103.4 kg (228 lb).  His heart rate was 95  and respirations Data Unavailable per minute.  The blood pressure in his right arm was 121/74.  He was acyanotic, warm and well perfused. He was alert cooperative and in no distress.  His lungs were clear to auscultation without respiratory distress.  He had a regular rhythm with no murmur.  The second heart sound was physiologically split with a normal pulmonary component.   There was no organomegaly or abdominal tenderness.  Peripheral pulses were 2+ and equal in all extremities.  There was no clubbing or edema.    An echocardiogram performed today that I personally reviewed and explained to him and his mother was normal with a normal aortic root size and no mitral valve prolapse.  An electrocardiogram performed today that I personally reviewed and explained to him and his mother continued to show borderline first-degree heart block with a HI interval 214 ms and was otherwise normal.    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  Despite first-degree heart block he is asymptomatic.  He " does not need any restriction of his activities.  I encouraged him to stop smoking.  I would like to see him in follow-up in 1 year with an electrocardiogram.    Thank you very much for your confidence in allowing me to participate in Mendy's care.  If you have any questions or concerns, please don't hesitate to contact me.    Sincerely,      Aaron Pagan M.D.   Pediatric Cardiology   [facility]  Pediatric Specialty Clinic  [phone]    Note: Chart documentation done in part with Dragon Voice Recognition software. Although reviewed after completion, some word and grammatical errors may remain.

## 2018-12-13 NOTE — PATIENT INSTRUCTIONS
Thank you for choosing [facility]. It was a pleasure to see you for your office visit today.     To reach our Specialty Clinic: [phone]  To reach our Imaging scheduler: [phone]      If you had any blood work, imaging or other tests:  Normal test results will be mailed to your home address in a letter  Abnormal results will be communicated to you via phone call/letter  Please allow up to 1-2 weeks for processing/interpretation of most lab work  If you have questions or concerns call our clinic at [phone]

## 2018-12-28 ENCOUNTER — TRANSFERRED RECORDS (OUTPATIENT)
Dept: HEALTH INFORMATION MANAGEMENT | Facility: CLINIC | Age: 18
End: 2018-12-28

## 2019-01-10 DIAGNOSIS — F90.9 ATTENTION DEFICIT HYPERACTIVITY DISORDER (ADHD), UNSPECIFIED ADHD TYPE: Chronic | ICD-10-CM

## 2019-01-14 RX ORDER — LISDEXAMFETAMINE DIMESYLATE 60 MG/1
CAPSULE ORAL
Qty: 30 CAPSULE | Refills: 0 | Status: SHIPPED | OUTPATIENT
Start: 2019-01-14 | End: 2019-02-25

## 2019-01-14 NOTE — TELEPHONE ENCOUNTER
Vyvanse prescription at . LM for patient that this is ready to . Tracee Rodriguez,     Patient received prescription from  1/17/19.  Yohannes ROBLES

## 2019-01-14 NOTE — TELEPHONE ENCOUNTER
reviewed. No concerns.  Last dispensed on 12/14/18 #30 for a 30 day supply.    Sandee Shin RN  [facility]

## 2019-03-22 ENCOUNTER — OFFICE VISIT (OUTPATIENT)
Dept: FAMILY MEDICINE | Facility: CLINIC | Age: 19
End: 2019-03-22
Payer: COMMERCIAL

## 2019-03-22 VITALS
OXYGEN SATURATION: 99 % | DIASTOLIC BLOOD PRESSURE: 80 MMHG | WEIGHT: 235 LBS | SYSTOLIC BLOOD PRESSURE: 128 MMHG | RESPIRATION RATE: 18 BRPM | TEMPERATURE: 98.2 F | HEIGHT: 69 IN | BODY MASS INDEX: 34.8 KG/M2 | HEART RATE: 91 BPM

## 2019-03-22 DIAGNOSIS — F33.1 MAJOR DEPRESSIVE DISORDER, RECURRENT EPISODE, MODERATE (H): Chronic | ICD-10-CM

## 2019-03-22 DIAGNOSIS — F90.9 ATTENTION DEFICIT HYPERACTIVITY DISORDER (ADHD), UNSPECIFIED ADHD TYPE: Chronic | ICD-10-CM

## 2019-03-22 PROCEDURE — 99213 OFFICE O/P EST LOW 20 MIN: CPT | Performed by: INTERNAL MEDICINE

## 2019-03-22 RX ORDER — LISDEXAMFETAMINE DIMESYLATE 60 MG/1
CAPSULE ORAL
Qty: 30 CAPSULE | Refills: 0 | Status: SHIPPED | OUTPATIENT
Start: 2019-03-22 | End: 2019-03-22

## 2019-03-22 RX ORDER — LISDEXAMFETAMINE DIMESYLATE 60 MG/1
CAPSULE ORAL
Qty: 30 CAPSULE | Refills: 0 | Status: SHIPPED | OUTPATIENT
Start: 2019-03-22 | End: 2019-06-21

## 2019-03-22 ASSESSMENT — PATIENT HEALTH QUESTIONNAIRE - PHQ9: SUM OF ALL RESPONSES TO PHQ QUESTIONS 1-9: 14

## 2019-03-22 ASSESSMENT — MIFFLIN-ST. JEOR: SCORE: 2057.36

## 2019-03-22 NOTE — PATIENT INSTRUCTIONS
I want to suggest that from now on, we can stop requesting face to face encounter office visits every third month. Instead I propose we change this to a 6 month face to face encounter with just a telephone MD visit every third month in-between. This is a much less costly and easier appointment .

## 2019-03-22 NOTE — PROGRESS NOTES
SUBJECTIVE:   Klebo J Skjervold David is a 19 year old male who presents to clinic today for the following health issues:    Last appointment with me was 9-, her for refills with lisdexamfetamine (VYVANSE)     Preventative healthcare maintenance goals including eye exams, prophylactic vaccination and inoculation against influenza, PHQ9 depression survey, preventative health care visit     Has cardiology consultation 12-13-18     An echocardiogram performed today that I personally reviewed and explained to him and his mother was normal with a normal aortic root size and no mitral valve prolapse.  An electrocardiogram performed today that I personally reviewed and explained to him and his mother continued to show borderline first-degree heart block with a MO interval 214 ms and was otherwise normal.     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  Despite first-degree heart block he is asymptomatic.  He does not need any restriction of his activities.  I encouraged him to stop smoking.  I would like to see him in follow-up in 1 year with an electrocardiogram.     and retinal specialist visits 1-9-19 for Stickler syndrome with Vitreoretinal floaters      He reports that he has seen improvement in concentration with current treatment with Vyvanse. He also continues treatment with sertraline [ Zoloft ]     Problem list and histories reviewed & adjusted, as indicated.  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, ou (PQ)     Sleep disorder, circadian, delayed sleep phase type     PETRA (generalized anxiety disorder)     Past Surgical History:   Procedure Laterality Date      CIRCUMCISION       EYE SURGERY  2008    Laser retinas both eyes     frenulectomy       GENITOURINARY SURGERY  2001    Circumcision     H ARGON LASER FOR RETINAL TEAR      both eyes (PQ)     HERNIA REPAIR, INGUINAL RT/LT      Hernia Repair, Femoral RT/LT     PE TUBES       RETINAL REATTACHMENT       supranumery tooth extraction       TONSILLECTOMY & ADENOIDECTOMY         Social History     Tobacco Use     Smoking status: Current Every Day Smoker     Packs/day: 1.00     Types: Cigarettes     Smokeless tobacco: Never Used   Substance Use Topics     Alcohol use: No     Family History   Problem Relation Age of Onset     Allergies Mother      Obesity Mother      Thyroid Disease Mother      Diabetes Mother      Hypertension Mother      Depression/Anxiety Mother      Anesthesia Reaction Mother      Thyroid Disease Mother      Asthma Mother      Known Genetic Syndrome Mother         Sticklers     Thyroid Disease Maternal Grandmother      Hypertension Maternal Grandmother      Thyroid Disease Maternal Grandmother      Known Genetic Syndrome Maternal Grandmother         Sticklers     Alzheimer Disease Paternal Grandfather      Diabetes Paternal Grandfather      Chemical Addiction Paternal Grandfather      Diabetes Father      Cancer Maternal Grandfather      Diabetes Other      Thyroid Disease Other      Cerebrovascular Disease Other      Cancer Other      Glaucoma Other      Macular Degeneration Other      Diabetes Other      Prostate Cancer Other      Cerebrovascular Disease Other      Thyroid Disease Other      Known Genetic Syndrome Other         Sticklers     Cerebrovascular Disease Other      Known Genetic Syndrome Other         Sticklers         BP Readings from Last 3 Encounters:   03/22/19 128/80   12/13/18 121/74   09/17/18 112/64    Wt Readings from Last 3 Encounters:   03/22/19 106.6 kg (235 lb)   12/13/18 103.4 kg (228 lb)   09/17/18 108 kg (238 lb)         Reviewed and updated as needed this visit by clinical  "staff  Tobacco  Allergies  Meds  Med Hx  Surg Hx  Fam Hx  Soc Hx      Reviewed and updated as needed this visit by Provider       ROS:  Constitutional, HEENT, cardiovascular, pulmonary, GI, , musculoskeletal, neuro, skin, endocrine and psych systems are negative, except as otherwise noted.    This document serves as a record of the services and decisions personally performed and made by Enrike Neal MD. It was created on his behalf by Solomon Ty, a trained medical scribe. The creation of this document is based on the provider's statements to the medical scribe.  Solomon Ty 3:27 PM March 22, 2019    OBJECTIVE:     /80   Pulse 91   Temp 98.2  F (36.8  C) (Oral)   Resp 18   Ht 1.746 m (5' 8.75\")   Wt 106.6 kg (235 lb)   SpO2 99%   BMI 34.96 kg/m    Body mass index is 34.96 kg/m .  GENERAL: healthy, alert and no distress  EYES: Eyes grossly normal to inspection, PERRL and conjunctivae and sclerae normal  SKIN: no suspicious lesions or rashes to visible skin   NEURO: Normal strength and tone, mentation intact and speech normal  PSYCH: mentation appears normal, affect normal/bright    Diagnostic Test Results:  No results found for this or any previous visit (from the past 24 hour(s)).    ASSESSMENT/PLAN:     (F33.1) Major depressive disorder, recurrent episode, moderate (H)  Comment: continue current plan of care , recheck in 6 months  Plan: sertraline (ZOLOFT) 50 MG tablet            (F90.9) Attention deficit hyperactivity disorder (ADHD), unspecified ADHD type  Comment: continue current plan of care  . I want to suggest that from now on, we can stop requesting face to face encounter office visits every third month. Instead I propose we change this to a 6 month face to face encounter with just a telephone MD visit every third month in-between. This is a much less costly and easier appointment .     Plan: VYVANSE 60 MG capsule, DISCONTINUED: VYVANSE 60        MG capsule, DISCONTINUED: VYVANSE " 60 MG capsule              See Patient Instructions    The information in this document, created by the medical scribe for me, accurately reflects the services I personally performed and the decisions made by me. I have reviewed and approved this document for accuracy prior to leaving the patient care area.  March 22, 2019 3:27 PM    Enrike Neal MD  [facility]

## 2019-04-24 ENCOUNTER — TELEPHONE (OUTPATIENT)
Dept: FAMILY MEDICINE | Facility: CLINIC | Age: 19
End: 2019-04-24

## 2019-04-24 NOTE — TELEPHONE ENCOUNTER
Reason for call:  Other   Patient called regarding (reason for call): call back  Additional comments: Patients mom is calling wanting to know if Dr. Neal cleans out ear wax or if he needs to see an ENT. Please call back    Phone number to reach patient:  Home number on file [phone] (home)    Best Time:  any    Can we leave a detailed message on this number?  YES

## 2019-04-24 NOTE — TELEPHONE ENCOUNTER
Spoke to patients mother and patient has appointment 4/25/2019 with provider.  Sandee LANDRY CMA ([facility])

## 2019-04-24 NOTE — TELEPHONE ENCOUNTER
Needs seen by provider within the past 3 months that included ear assessment and order/need for ear wash documented by provider  Can be performed by MA    Needs OV as this was not assessed and ordered at last OV    Noreen Howard RN

## 2019-04-25 ENCOUNTER — OFFICE VISIT (OUTPATIENT)
Dept: FAMILY MEDICINE | Facility: CLINIC | Age: 19
End: 2019-04-25
Payer: COMMERCIAL

## 2019-04-25 VITALS
TEMPERATURE: 97 F | BODY MASS INDEX: 34.69 KG/M2 | RESPIRATION RATE: 20 BRPM | HEIGHT: 69 IN | HEART RATE: 93 BPM | SYSTOLIC BLOOD PRESSURE: 132 MMHG | DIASTOLIC BLOOD PRESSURE: 80 MMHG | OXYGEN SATURATION: 99 % | WEIGHT: 234.2 LBS

## 2019-04-25 DIAGNOSIS — H61.23 CERUMINOSIS, BILATERAL: Primary | ICD-10-CM

## 2019-04-25 DIAGNOSIS — Z11.3 SCREENING EXAMINATION FOR VENEREAL DISEASE: ICD-10-CM

## 2019-04-25 PROCEDURE — 87491 CHLMYD TRACH DNA AMP PROBE: CPT | Performed by: PHYSICIAN ASSISTANT

## 2019-04-25 PROCEDURE — 87591 N.GONORRHOEAE DNA AMP PROB: CPT | Performed by: PHYSICIAN ASSISTANT

## 2019-04-25 PROCEDURE — 69209 REMOVE IMPACTED EAR WAX UNI: CPT | Mod: 50 | Performed by: PHYSICIAN ASSISTANT

## 2019-04-25 PROCEDURE — 99213 OFFICE O/P EST LOW 20 MIN: CPT | Mod: 25 | Performed by: PHYSICIAN ASSISTANT

## 2019-04-25 ASSESSMENT — MIFFLIN-ST. JEOR: SCORE: 2053.73

## 2019-04-25 NOTE — PROGRESS NOTES
"  SUBJECTIVE:   Klebo J Skjervold David is a 19 year old male who presents to clinic today for the following   health issues:      Patient presents with:  Ear Plugs: Right ear plugged since yesterday morning    Additional history: No drainage or pain    Reviewed  and updated as needed this visit by clinical staff  Tobacco  Allergies  Meds  Med Hx  Surg Hx  Fam Hx  Soc Hx          ROS:  Constitutional, HEENT, cardiovascular, pulmonary, gi and gu systems are negative, except as otherwise noted.    OBJECTIVE:     /80   Pulse 93   Temp 97  F (36.1  C) (Oral)   Resp 20   Ht 1.746 m (5' 8.75\")   Wt 106.2 kg (234 lb 3.2 oz)   SpO2 99%   BMI 34.84 kg/m    Body mass index is 34.84 kg/m .  GENERAL: healthy, alert and no distress  HENT: normal cephalic/atraumatic and both ears: occluded with wax, cleared with warm water irrigation without sequelae.   NECK: no adenopathy, no asymmetry, masses, or scars and thyroid normal to palpation    Diagnostic Test Results:  none     ASSESSMENT/PLAN:   1. Ceruminosis, bilateral  -Preventative measures reviewed.    2. Screening examination for venereal disease  - NEISSERIA GONORRHOEA PCR  - CHLAMYDIA TRACHOMATIS PCR    Follow up as needed.  Patient amenable to this follow up plan.     Nishi Bhatia PA-C  [facility]      "

## 2019-04-27 ENCOUNTER — OFFICE VISIT (OUTPATIENT)
Dept: URGENT CARE | Facility: URGENT CARE | Age: 19
End: 2019-04-27
Payer: COMMERCIAL

## 2019-04-27 VITALS
BODY MASS INDEX: 34.6 KG/M2 | DIASTOLIC BLOOD PRESSURE: 78 MMHG | TEMPERATURE: 98.3 F | OXYGEN SATURATION: 97 % | WEIGHT: 232.6 LBS | SYSTOLIC BLOOD PRESSURE: 127 MMHG | HEART RATE: 79 BPM

## 2019-04-27 DIAGNOSIS — J30.2 SEASONAL ALLERGIC RHINITIS DUE TO FUNGAL SPORES: ICD-10-CM

## 2019-04-27 DIAGNOSIS — Z72.0 TOBACCO ABUSE: ICD-10-CM

## 2019-04-27 DIAGNOSIS — R09.81 NASAL CONGESTION: Primary | ICD-10-CM

## 2019-04-27 PROCEDURE — 99213 OFFICE O/P EST LOW 20 MIN: CPT | Performed by: FAMILY MEDICINE

## 2019-04-27 RX ORDER — FLUTICASONE PROPIONATE 50 MCG
1-2 SPRAY, SUSPENSION (ML) NASAL DAILY
Qty: 16 G | Refills: 0 | Status: SHIPPED | OUTPATIENT
Start: 2019-04-27 | End: 2020-11-02

## 2019-04-27 NOTE — PATIENT INSTRUCTIONS
Patient Education     Sinusitis (No Antibiotics)    The sinuses are air-filled spaces within the bones of the face. They connect to the inside of the nose. Sinusitis is an inflammation of the tissue that lines the sinuses. Sinusitis can occur during a cold. It can also happen due to allergies to pollens and other particles in the air. It can cause symptoms such as sinus congestion, headache, sore throat, facial swelling, and a feeling of fullness. It may also cause a low-grade fever. Your sinusitis does not include an infection with bacteria. Because of this, antibiotics are not used to treat this problem.  Home care    Drink plenty of water, hot tea, and other liquids. This may help thin nasal mucus. It also may help your sinuses drain fluids.    Heat may help soothe painful areas of your face. Use a towel soaked in hot water. Or,  the shower and direct the warm spray onto your face. Using a vaporizer along with a menthol rub at night may also help soothe symptoms.     An expectorant with guaifenesin may help thin nasal mucus and help your sinuses drain fluids.    You can use an over-the-counter decongestant, unless a similar medicine was prescribed to you. Nasal sprays work the fastest. Use one that contains phenylephrine or oxymetazoline. First blow your nose gently. Then use the spray. Do not use these medicines more often than directed on the label. If you do, your symptoms may get worse. You may also take pills that contain pseudoephedrine. Don t use products that combine multiple medicines. This is because side effects may be increased. Read all medicine labels. You can also ask the pharmacist for help. (People with high blood pressure should not use decongestants. They can raise blood pressure.)    Over-the-counter antihistamines may help if allergies contributed to your sinusitis.      Use acetaminophen or ibuprofen to control pain, unless another pain medicine was prescribed to you. If you have  chronic liver or kidney disease or ever had a stomach ulcer, talk with your healthcare provider before using these medicines. (Aspirin should never be taken by anyone under age 18 who is ill with a fever. It may cause severe liver damage.)    Use nasal rinses or irrigation as instructed by your healthcare provider.    Don't smoke. This can make symptoms worse.  Follow-up care  Follow up with your healthcare provider or our staff if you are NOT better in 1 week.  When to seek medical advice  Call your healthcare provider if any of these occur:    Green or yellow fluid draining from your nose or into your throat    Facial pain or headache that gets worse    Stiff neck    Unusual drowsiness or confusion    Swelling of your forehead or eyelids    Vision problems, such as blurred or double vision    Fever of 100.4 F (38 C) or higher, or as directed by your healthcare provider    Seizure    Breathing problems    Symptoms that don't go away in 10 days  Date Last Reviewed: 11/1/2017 2000-2018 [employer]. [address] [place] [address], [place] [address]. All rights reserved. This information is not intended as a substitute for professional medical care. Always follow your healthcare professional's instructions.

## 2019-04-27 NOTE — PROGRESS NOTES
SUBJECTIVE:  Chief Complaint   Patient presents with     Sinus Problem     Facial pressure x 1 day.      Klebo J Skjervold David is a 19 year old male here with concerns about sinus infection.  He states onset of symptoms were 1 day(s) ago.  He has had maxillary, frontal pressure. Course of illness is same. Severity moderate  Current and Associated symptoms: nasal congestion, facial pain/pressure and headache  Predisposing factors include seasonal allergies. Recent treatment has included: Antihistamine    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, ou (PQ)     Sleep disorder, circadian, delayed sleep phase type     PETRA (generalized anxiety disorder)         ALLERGIES:  Augmentin and Sulfamethoxazole      Current Outpatient Medications on File Prior to Visit:  cetirizine HCl 10 MG CAPS Take 1 capsule by mouth daily as needed Needs to be seen b/4 further refills for allergies   melatonin 5 MG tablet Take 5 mg by mouth nightly as needed for sleep   sertraline (ZOLOFT) 50 MG tablet Take 1 tablet (50 mg) by mouth daily   Vitamin D, Cholecalciferol, 1000 units TABS Take 1 tablet by mouth daily    VYVANSE 60 MG capsule TAKE 1 CAPSULE BY MOUTH EVERY MORNING. MUST BE 28 DAYS BETWEEN PRESCRIPTIONS -     No current facility-administered  medications on file prior to visit.     Social History     Tobacco Use     Smoking status: Current Every Day Smoker     Packs/day: 1.00     Types: Cigarettes     Smokeless tobacco: Never Used   Substance Use Topics     Alcohol use: No       Family History   Problem Relation Age of Onset     Allergies Mother      Obesity Mother      Thyroid Disease Mother      Diabetes Mother      Hypertension Mother      Depression/Anxiety Mother      Anesthesia Reaction Mother      Thyroid Disease Mother      Asthma Mother      Known Genetic Syndrome Mother         Sticklers     Thyroid Disease Maternal Grandmother      Hypertension Maternal Grandmother      Thyroid Disease Maternal Grandmother      Known Genetic Syndrome Maternal Grandmother         Sticklers     Alzheimer Disease Paternal Grandfather      Diabetes Paternal Grandfather      Chemical Addiction Paternal Grandfather      Diabetes Father      Cancer Maternal Grandfather      Diabetes Other      Thyroid Disease Other      Cerebrovascular Disease Other      Cancer Other      Glaucoma Other      Macular Degeneration Other      Diabetes Other      Prostate Cancer Other      Cerebrovascular Disease Other      Thyroid Disease Other      Known Genetic Syndrome Other         Sticklers     Cerebrovascular Disease Other      Known Genetic Syndrome Other         Sticklers       ROS:  CONSTITUTIONAL:NEGATIVE for fever, chills,    INTEGUMENTARY/SKIN: NEGATIVE for worrisome rashes,   EYES: NEGATIVE for vision changes or irritation  RESP:NEGATIVE for significant cough or SOB  GI: NEGATIVE for nausea, abdominal pain,  habits    OBJECTIVE:  /78   Pulse 79   Temp 98.3  F (36.8  C) (Oral)   Wt 105.5 kg (232 lb 9.6 oz)   SpO2 97%   BMI 34.60 kg/m    GENERAL APPEARANCE: alert, mild distress and cooperative, smells of cigarette smoke  EYES: EOMI,  PERRL, conjunctiva clear  HENT: ear canals and TM's normal.  Nose normal.  Pharynx erythematous with some exudate noted.  HENT: no  frontal sinus tenderness  And no maxillary sinus tenderness   NECK: supple, non-tender to palpation, no adenopathy noted  RESP: lungs clear to auscultation - no rales, rhonchi or wheezes  CV: regular rates and rhythm, normal S1 S2, no murmur noted  SKIN: no suspicious lesions or rashes    ASSESSMENT:  Nasal congestion     - fluticasone (FLONASE) 50 MCG/ACT nasal spray; Spray 1-2 sprays into both nostrils daily    Seasonal allergic rhinitis due to fungal spores     - fluticasone (FLONASE) 50 MCG/ACT nasal spray; Spray 1-2 sprays into both nostrils daily     We discussed the primary importance of home cares to promote drainage and ventilation of the sinuses to decrease symptoms of sinus pressure and to eliminate infectious drainage from the sinuses.  I encouraged the use of saline nasal spray as needed to promote cleaning of the nasal passages and to promote drainage of the sinuses.  Allergy medications and steroid nasal spray help reduce swelling within the nasal tissue and may help open drainage/ ventilation passages to the sinuses.  Expectorants are recommended rather than decongestants to help promote sinus drainage.  Antibiotics are discussed as a secondary therapy for sinus infections that are unresponsive to home measures to promote sinus drainage and ventilation.  No antibiotic needed at present      We discussed that in cases of more severe allergic symptoms that dosing of the non-sedating antihistamines can be increased temporarily to 2-4 x the recommended dose on the package.      We discussed that the increased antihistamine dose would have a safer profile than using a prolonged dosing of systemic steroids.        Follow up with primary clinic if not improving     Tobacco abuse  Encouraged further efforts to reduce smoking.      that tobacco worsens sinus symptoms

## 2019-06-21 ENCOUNTER — VIRTUAL VISIT (OUTPATIENT)
Dept: FAMILY MEDICINE | Facility: CLINIC | Age: 19
End: 2019-06-21
Payer: COMMERCIAL

## 2019-06-21 DIAGNOSIS — F90.9 ATTENTION DEFICIT HYPERACTIVITY DISORDER (ADHD), UNSPECIFIED ADHD TYPE: Chronic | ICD-10-CM

## 2019-06-21 PROCEDURE — 99441 ZZC PHYSICIAN TELEPHONE EVALUATION 5-10 MIN: CPT | Performed by: INTERNAL MEDICINE

## 2019-06-21 RX ORDER — LISDEXAMFETAMINE DIMESYLATE 60 MG/1
CAPSULE ORAL
Qty: 30 CAPSULE | Refills: 0 | Status: SHIPPED | OUTPATIENT
Start: 2019-06-21 | End: 2019-06-21

## 2019-06-21 RX ORDER — LISDEXAMFETAMINE DIMESYLATE 60 MG/1
CAPSULE ORAL
Qty: 30 CAPSULE | Refills: 0 | Status: SHIPPED | OUTPATIENT
Start: 2019-06-21 | End: 2019-08-28

## 2019-06-21 NOTE — PROGRESS NOTES
"Klebo J Skjervold David is a 19 year old male who is being evaluated via a telephone visit.      The patient has been notified of following (by CAMILA LIMA CMA ([facility])       \"We have found that certain health care needs can be provided without the need for a physical exam.  This service lets us provide the care you need with a short phone conversation.  If a prescription is necessary we can send it directly to your pharmacy.  If lab work is needed we can place an order for that and you can then stop by our lab to have the test done at a later time.    This telephone visit will be conducted via 3 way call with the you (the patient) , the physician/provider, and a me all on the line at the same time.  This allows your physician/provider to have the phone conversation with you while I will be taking notes for your medical record.  We will have full access to your [place] medical record during this entire phone call.    Since this is like an office visit,  will bill your insurance company for this service.  Please check with your medical insurance if this type of telephone/virtual is covered . You may be responsible for the cost of this service if insurance coverage is denied.  The typical cost is $30 (10min), $59(11-20min) and $85 (21-30min)     If during the course of the call the physician/provider feels a telephone visit is not appropriate, you will not be charged for this service\"    Consent has been obtained for this service by care team member: yes.  See the scanned image in the medical record.    S: The history as provided by the patient to the provider during this 3 way call include     Pertinent parts of the the patient's medical history reviewed and confirmed by the provider included : chronic and successful use of stimulant medication to help with attention deficit and hyperactivity disorder, noted past history of stickler syndrome and recent cardiology consultation and echocardiogram . We will see " patient back in 3 months for face to face encounter. There are no new issues to discuss and patient feels satisfied and pleased with his current treatment plan and seeks refills    Total time of call between patient and provider was 12:37 until 12:42 or 5 minutes     Enrike Neal MD   (MD signature)  ===================================================    I have reviewed the note as documented above.  This accurately captures the substance of my conversation with the patient,    Additional provider notes: as detailed above     Assessment/Plan:  (F90.9) Attention deficit hyperactivity disorder (ADHD), unspecified ADHD type  Comment: 3 refills provided   Plan: VYVANSE 60 MG capsule, DISCONTINUED: VYVANSE 60        MG capsule, DISCONTINUED: VYVANSE 60 MG capsule

## 2019-07-08 ENCOUNTER — OFFICE VISIT (OUTPATIENT)
Dept: OPTOMETRY | Facility: CLINIC | Age: 19
End: 2019-07-08
Payer: COMMERCIAL

## 2019-07-08 DIAGNOSIS — Z01.01 ENCOUNTER FOR EXAMINATION OF EYES AND VISION WITH ABNORMAL FINDINGS: Primary | ICD-10-CM

## 2019-07-08 DIAGNOSIS — H35.413 BILATERAL RETINAL LATTICE DEGENERATION: ICD-10-CM

## 2019-07-08 DIAGNOSIS — Q89.8 STICKLER SYNDROME: ICD-10-CM

## 2019-07-08 DIAGNOSIS — H52.223 REGULAR ASTIGMATISM OF BOTH EYES: ICD-10-CM

## 2019-07-08 DIAGNOSIS — H52.13 MYOPIA, BILATERAL: ICD-10-CM

## 2019-07-08 PROCEDURE — 92014 COMPRE OPH EXAM EST PT 1/>: CPT | Performed by: OPTOMETRIST

## 2019-07-08 PROCEDURE — 92015 DETERMINE REFRACTIVE STATE: CPT | Performed by: OPTOMETRIST

## 2019-07-08 PROCEDURE — 92310 CONTACT LENS FITTING OU: CPT | Mod: GA | Performed by: OPTOMETRIST

## 2019-07-08 ASSESSMENT — REFRACTION_WEARINGRX
OD_SPHERE: -19.75
OS_CYLINDER: +0.75
SPECS_TYPE: SVL
OS_AXIS: 089
OD_AXIS: 169
OD_CYLINDER: +2.75
OS_SPHERE: -19.00

## 2019-07-08 ASSESSMENT — SLIT LAMP EXAM - LIDS
COMMENTS: NORMAL
COMMENTS: NORMAL

## 2019-07-08 ASSESSMENT — REFRACTION_CURRENTRX
OD_AXIS: 085
OS_BRAND: COOPERVISION
OS_SPHERE: -16.25
OS_BASECURVE: 8.60
OS_CYLINDER: -0.75
OS_AXIS: 100
OD_SPHERE: -13.75
OD_BRAND: COOPERVISION
OD_CYLINDER: -2.75
OD_BASECURVE: 8.60

## 2019-07-08 ASSESSMENT — TONOMETRY
OS_IOP_MMHG: 18
IOP_METHOD: APPLANATION
OD_IOP_MMHG: 20

## 2019-07-08 ASSESSMENT — VISUAL ACUITY
OS_CC: 20/30
OS_CC+: -1
CORRECTION_TYPE: CONTACTS
OD_CC: 20/25
METHOD: SNELLEN - LINEAR
OS_CC: 20/20
OD_CC: 20/30

## 2019-07-08 ASSESSMENT — CONF VISUAL FIELD
OS_NORMAL: 1
OD_NORMAL: 1

## 2019-07-08 ASSESSMENT — CUP TO DISC RATIO
OD_RATIO: 0.4
OS_RATIO: 0.4

## 2019-07-08 ASSESSMENT — REFRACTION_MANIFEST
OD_AXIS: 150
OD_CYLINDER: +2.50
OS_AXIS: 170
OS_CYLINDER: +1.50
OD_SPHERE: -19.00
OS_SPHERE: -19.00

## 2019-07-08 ASSESSMENT — EXTERNAL EXAM - LEFT EYE: OS_EXAM: NORMAL

## 2019-07-08 ASSESSMENT — EXTERNAL EXAM - RIGHT EYE: OD_EXAM: NORMAL

## 2019-07-08 NOTE — PATIENT INSTRUCTIONS
"Mendy was advised of today's exam findings.  Optional to fill new glasses prescription, minimal change  Copy of glasses Rx provided today.    Continue with Hydrasoft toric lenses.   Do not sleep in contact lenses. Health risks discussed.  Do not swim in contact lenses . Health risks discussed.  Replace contacts every 3 months.  Maximum contact wearing time of 12 hours per day.  Contact lens prescription provided today.     You have lattice degeneration, which is a thinning of the peripheral retina.  This puts you at a slightly higher risk of developing a tear in the retina.  The signs of a retinal detachment can include a large change in \"floaters\", flashes of light or a \"curtain veil\" covering the vision.  If you should notice any of these changes, return to the clinic immediately.     Return in 1 year for eye exam, or sooner if needed.    The effects of the dilating drops last for 4- 6 hours.  You will be more sensitive to light and vision will be blurry up close.  Mydriatic sunglasses were given if needed.      Mack Shaw O.D.  [address]  [place]  [address]    [phone]    "

## 2019-07-08 NOTE — PROGRESS NOTES
"Chief Complaint   Patient presents with     Annual Eye Exam     Accompanied by self  Previous contact lens wearer? Yes:   Comfort of contact lenses Good  Satisfied with current lenses: Yes        Last Eye Exam: 1.5 years ago  Dilated Previously: Yes    What are you currently using to see?  glasses and contacts, glasses are 2 years old    Distance Vision Acuity: Satisfied with vision    Near Vision Acuity: Satisfied with vision while reading  unaided    Eye Comfort: good  Do you use eye drops? : No  Occupation or Hobbies:       Virginie Niño, [employer]     Medical, surgical and family histories reviewed and updated 7/8/2019.       OBJECTIVE: See Ophthalmology exam    ASSESSMENT:    ICD-10-CM    1. 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 Myopia, bilateral H52.13    5. Regular astigmatism of both eyes H52.223       PLAN:     Patient Instructions   Mendy was advised of today's exam findings.  Optional to fill new glasses prescription, minimal change  Copy of glasses Rx provided today.    Continue with Hydrasoft toric lenses.   Do not sleep in contact lenses. Health risks discussed.  Do not swim in contact lenses . Health risks discussed.  Replace contacts every 3 months.  Maximum contact wearing time of 12 hours per day.  Contact lens prescription provided today.     You have lattice degeneration, which is a thinning of the peripheral retina.  This puts you at a slightly higher risk of developing a tear in the retina.  The signs of a retinal detachment can include a large change in \"floaters\", flashes of light or a \"curtain veil\" covering the vision.  If you should notice any of these changes, return to the clinic immediately.     Return in 1 year for eye exam, or sooner if needed.    The effects of the dilating drops last for 4- 6 hours.  You will be more sensitive to light " and vision will be blurry up close.  Mydriatic sunglasses were given if needed.      Mack Shaw O.D.  [address]  [place], [place]  [id number] [phone]

## 2019-07-08 NOTE — LETTER
"    7/8/2019         RE: Klebo J Skjervold David  [address] [facility] [address]        Dear Colleague,    Thank you for referring your patient, Klebo J Skjervold David, to the [facility]. Please see a copy of my visit note below.    Chief Complaint   Patient presents with     Annual Eye Exam     Accompanied by self  Previous contact lens wearer? Yes:   Comfort of contact lenses Good  Satisfied with current lenses: Yes        Last Eye Exam: 1.5 years ago  Dilated Previously: Yes    What are you currently using to see?  glasses and contacts, glasses are 2 years old    Distance Vision Acuity: Satisfied with vision    Near Vision Acuity: Satisfied with vision while reading  unaided    Eye Comfort: good  Do you use eye drops? : No  Occupation or Hobbies:       Virginie Niño, [employer]     Medical, surgical and family histories reviewed and updated 7/8/2019.       OBJECTIVE: See Ophthalmology exam    ASSESSMENT:    ICD-10-CM    1. 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 Myopia, bilateral H52.13    5. Regular astigmatism of both eyes H52.223       PLAN:     Patient Instructions   Mendy was advised of today's exam findings.  Optional to fill new glasses prescription, minimal change  Copy of glasses Rx provided today.    Continue with Hydrasoft toric lenses.   Do not sleep in contact lenses. Health risks discussed.  Do not swim in contact lenses . Health risks discussed.  Replace contacts every 3 months.  Maximum contact wearing time of 12 hours per day.  Contact lens prescription provided today.     You have lattice degeneration, which is a thinning of the peripheral retina.  This puts you at a slightly higher risk of developing a tear in the retina.  The signs of a retinal detachment can include a large change in \"floaters\", flashes of light or a " "\"curtain veil\" covering the vision.  If you should notice any of these changes, return to the clinic immediately.     Return in 1 year for eye exam, or sooner if needed.    The effects of the dilating drops last for 4- 6 hours.  You will be more sensitive to light and vision will be blurry up close.  Mydriatic sunglasses were given if needed.      Mack Shaw O.D.  [address]  [address]    [phone]                         Again, thank you for allowing me to participate in the care of your patient.        Sincerely,        Mack Shaw, OD    "

## 2019-08-28 DIAGNOSIS — F90.9 ATTENTION DEFICIT HYPERACTIVITY DISORDER (ADHD), UNSPECIFIED ADHD TYPE: Chronic | ICD-10-CM

## 2019-08-28 RX ORDER — LISDEXAMFETAMINE DIMESYLATE 60 MG/1
CAPSULE ORAL
Qty: 30 CAPSULE | Refills: 0 | Status: SHIPPED | OUTPATIENT
Start: 2019-08-28 | End: 2019-09-16

## 2019-08-28 NOTE — TELEPHONE ENCOUNTER
Reason for Call:  Medication or medication refill:    Do you use a [facility]?  Name of the pharmacy and phone number for the current request:      Name of the medication requested: Vyvanse 60 MG    Other request: Patient tried to get a refill on medication, but was denied due to the prescription being outdated. Patient wants to get a new prescription.    Can we leave a detailed message on this number? YES    Phone number patient can be reached at: Cell number on file:    Telephone Information:   Mobile [phone]       Best Time: ASA    Call taken on 8/28/2019 at 9:22 AM by Yohannes Maya

## 2019-09-05 ENCOUNTER — TELEPHONE (OUTPATIENT)
Dept: FAMILY MEDICINE | Facility: CLINIC | Age: 19
End: 2019-09-05

## 2019-09-05 ASSESSMENT — PATIENT HEALTH QUESTIONNAIRE - PHQ9: SUM OF ALL RESPONSES TO PHQ QUESTIONS 1-9: 8

## 2019-09-05 NOTE — TELEPHONE ENCOUNTER
Called patient and completed questionnaire with a score of 8.      Benita LIMA CMA ([facility])

## 2019-09-16 ENCOUNTER — OFFICE VISIT (OUTPATIENT)
Dept: FAMILY MEDICINE | Facility: CLINIC | Age: 19
End: 2019-09-16
Payer: COMMERCIAL

## 2019-09-16 VITALS
HEIGHT: 69 IN | DIASTOLIC BLOOD PRESSURE: 72 MMHG | WEIGHT: 233.2 LBS | BODY MASS INDEX: 34.54 KG/M2 | OXYGEN SATURATION: 100 % | RESPIRATION RATE: 18 BRPM | TEMPERATURE: 98 F | SYSTOLIC BLOOD PRESSURE: 118 MMHG | HEART RATE: 75 BPM

## 2019-09-16 DIAGNOSIS — F33.1 MAJOR DEPRESSIVE DISORDER, RECURRENT EPISODE, MODERATE (H): Chronic | ICD-10-CM

## 2019-09-16 DIAGNOSIS — F90.9 ATTENTION DEFICIT HYPERACTIVITY DISORDER (ADHD), UNSPECIFIED ADHD TYPE: Chronic | ICD-10-CM

## 2019-09-16 PROCEDURE — 99214 OFFICE O/P EST MOD 30 MIN: CPT | Performed by: INTERNAL MEDICINE

## 2019-09-16 RX ORDER — LISDEXAMFETAMINE DIMESYLATE 60 MG/1
CAPSULE ORAL
Qty: 30 CAPSULE | Refills: 0 | Status: SHIPPED | OUTPATIENT
Start: 2019-09-16 | End: 2019-11-06

## 2019-09-16 ASSESSMENT — MIFFLIN-ST. JEOR: SCORE: 2049.2

## 2019-09-16 NOTE — PROGRESS NOTES
Subjective     Klebo J Skjervold David is a 19 year old male who presents to clinic today for the following health issues:       Major depressive disorder, recurrent episode, moderate (H)  Attention deficit hyperactivity disorder (ADHD), unspecified ADHD type     HPI     My last face to face encounter with this patient was 6 months ago. We had a virtual office visit 3 months ago. 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 He has  adult attention deficit disorder and use of lisdexamfetamine (VYVANSE) has proven effective for him.    He's here for medication refills with no new issues. Today we explained to patient the new controlled substance E-prescribed via [employer] and we sent his prescription to the pharmacy. He has no other issues or concerns today.    Patient Active Problem List   Diagnosis     ADHD: inattentive subtype     Innocent heart murmur     Abnormal electrocardiogram     Stickler syndrome     Cataract, mild, ou     Retinal degeneration     HL (hearing loss)     Moderate major depression (H)     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, ou (PQ)     Sleep disorder, circadian, delayed sleep phase type     PETRA (generalized anxiety disorder)     Past Surgical History:   Procedure Laterality Date     CIRCUMCISION       EYE SURGERY  2008    Laser retinas both eyes     frenulectomy       GENITOURINARY SURGERY  2001    Circumcision     H ARGON LASER FOR RETINAL TEAR      both eyes (PQ)     HERNIA REPAIR, INGUINAL RT/LT      Hernia Repair, Femoral RT/LT     PE TUBES       RETINAL REATTACHMENT       supranumery tooth extraction       TONSILLECTOMY & ADENOIDECTOMY         Social History     Tobacco Use     Smoking status: Current Every Day Smoker     Packs/day: 1.00     Types: Cigarettes     Smokeless tobacco: Never Used   Substance Use Topics     Alcohol use: No     Family History   Problem Relation Age of Onset     Allergies Mother      Obesity Mother       Thyroid Disease Mother      Diabetes Mother      Hypertension Mother      Depression/Anxiety Mother      Anesthesia Reaction Mother      Thyroid Disease Mother      Asthma Mother      Known Genetic Syndrome Mother         Sticklers     Thyroid Disease Maternal Grandmother      Hypertension Maternal Grandmother      Thyroid Disease Maternal Grandmother      Known Genetic Syndrome Maternal Grandmother         Sticklers     Alzheimer Disease Paternal Grandfather      Diabetes Paternal Grandfather      Chemical Addiction Paternal Grandfather      Diabetes Father      Cancer Maternal Grandfather      Diabetes Other      Thyroid Disease Other      Cerebrovascular Disease Other      Cancer Other      Glaucoma Other      Macular Degeneration Other      Diabetes Other      Prostate Cancer Other      Cerebrovascular Disease Other      Thyroid Disease Other      Known Genetic Syndrome Other         Sticklers     Cerebrovascular Disease Other      Known Genetic Syndrome Other         Sticklers         Current Outpatient Medications   Medication Sig Dispense Refill     cetirizine HCl 10 MG CAPS Take 1 capsule by mouth daily as needed Needs to be seen b/4 further refills for allergies 90 capsule 4     melatonin 5 MG tablet Take 5 mg by mouth nightly as needed for sleep       sertraline (ZOLOFT) 50 MG tablet Take 1 tablet (50 mg) by mouth daily 90 tablet 1     VYVANSE 60 MG capsule TAKE 1 CAPSULE BY MOUTH EVERY MORNING. MUST BE 28 DAYS BETWEEN PRESCRIPTIONS - 30 capsule 0     fluticasone (FLONASE) 50 MCG/ACT nasal spray Spray 1-2 sprays into both nostrils daily (Patient not taking: Reported on 9/16/2019) 16 g 0     Vitamin D, Cholecalciferol, 1000 units TABS Take 1 tablet by mouth daily        Allergies   Allergen Reactions     Augmentin      Sulfamethoxazole Rash     Face & Body rash           Social History     Tobacco Use     Smoking status: Current Every Day Smoker     Packs/day: 1.00     Types: Cigarettes     Smokeless  "tobacco: Never Used   Substance Use Topics     Alcohol use: No      Reviewed and updated as needed this visit by Provider         Review of Systems   ROS COMP: Constitutional, HEENT, cardiovascular, pulmonary, gi and gu systems are negative, except as otherwise noted.      Objective    Pulse 75   Temp 98  F (36.7  C) (Oral)   Resp 18   Ht 1.746 m (5' 8.75\")   Wt 105.8 kg (233 lb 3.2 oz)   SpO2 100%   BMI 34.69 kg/m    Body mass index is 34.69 kg/m .  Physical Exam   GENERAL: healthy, alert and no distress  EYES: Eyes grossly normal to inspection, PERRL and conjunctivae and sclerae normal  MS: no gross musculoskeletal defects noted, no edema  NEURO: Normal strength and tone, mentation intact and speech normal  PSYCH: mentation appears normal, affect normal/bright    Diagnostic Test Results:  none         Assessment & Plan     (F33.1) Major depressive disorder, recurrent episode, moderate (H)  Comment: refills provided   Plan: sertraline (ZOLOFT) 50 MG tablet            (F90.9) Attention deficit hyperactivity disorder (ADHD), unspecified ADHD type  Comment: refills provided   Plan: VYVANSE 60 MG capsule        Recheck with virtual office visit in 3 months for lisdexamfetamine (VYVANSE)        Tobacco Cessation:   reports that he has been smoking cigarettes.  He has been smoking about 1.00 pack per day. He has never used smokeless tobacco.  Tobacco Cessation Action Plan: Information offered: Patient not interested at this time      BMI:   Estimated body mass index is 34.69 kg/m  as calculated from the following:    Height as of this encounter: 1.746 m (5' 8.75\").    Weight as of this encounter: 105.8 kg (233 lb 3.2 oz).   Weight management plan: Discussed healthy diet and exercise guidelines        Recheck virtual office visit ion 3 months     No follow-ups on file.    Enrike Neal MD  [facility]      "

## 2019-11-03 ENCOUNTER — HEALTH MAINTENANCE LETTER (OUTPATIENT)
Age: 19
End: 2019-11-03

## 2019-11-05 DIAGNOSIS — F90.9 ATTENTION DEFICIT HYPERACTIVITY DISORDER (ADHD), UNSPECIFIED ADHD TYPE: Chronic | ICD-10-CM

## 2019-11-05 NOTE — TELEPHONE ENCOUNTER
Requested Prescriptions   Pending Prescriptions Disp Refills     VYVANSE 60 MG capsule 30 capsule 0     Sig: TAKE 1 CAPSULE BY MOUTH EVERY MORNING. MUST BE 28 DAYS BETWEEN PRESCRIPTIONS -       There is no refill protocol information for this order        Last Written Prescription Date:  9/16/19  Last Fill Quantity: 30,  # refills: 0   Last office visit: 9/16/2019 with prescribing provider:  Enrike Neal     Future Office Visit:   Next 5 appointments (look out 90 days)    Dec 12, 2019  3:20 PM CST  Return Visit with Aaron Pagan MD  [facility] ([facility] [address] [address] [place] [id number] [phone]

## 2019-11-06 RX ORDER — LISDEXAMFETAMINE DIMESYLATE 60 MG/1
CAPSULE ORAL
Qty: 30 CAPSULE | Refills: 0 | Status: SHIPPED | OUTPATIENT
Start: 2019-11-06 | End: 2019-12-11

## 2019-11-07 NOTE — TELEPHONE ENCOUNTER
Routing refill request to provider for review/approval because:  Drug not on the [facility] refill protocol     Dilia Mott RN - BC

## 2019-12-09 DIAGNOSIS — F90.9 ATTENTION DEFICIT HYPERACTIVITY DISORDER (ADHD), UNSPECIFIED ADHD TYPE: Chronic | ICD-10-CM

## 2019-12-09 NOTE — TELEPHONE ENCOUNTER
Controlled Substance Refill Request for VYVANSE 60 MG capsule  Problem List Complete:  No     PROVIDER TO CONSIDER COMPLETION OF PROBLEM LIST AND OVERVIEW/CONTROLLED SUBSTANCE AGREEMENT    Last Written Prescription Date:  11-6-19  Last Fill Quantity: 30,   # refills: 0    Last Office Visit with [facility] primary care provider: 9-16-19    Future Office visit:   Next 5 appointments (look out 90 days)    Dec 12, 2019  3:20 PM CST  Return Visit with Aaron Pagan MD  [facility] [address] [place] [id number] [phone]          Controlled substance agreement:   Encounter-Level CSA:    There are no encounter-level csa.     Patient-Level CSA:    There are no patient-level csa.         Last Urine Drug Screen: No results found for: CDAUT, No results found for: COMDAT, No results found for: THC13, PCP13, COC13, MAMP13, OPI13, AMP13, BZO13, TCA13, MTD13, BAR13, OXY13, PPX13, BUP13     Processing:  Rx to be electronically transmitted to pharmacy by provider      [email or web address]     checked in past 3 months?  No, route to RN

## 2019-12-09 NOTE — TELEPHONE ENCOUNTER
Reason for Call:  Other prescription    Detailed comments: VYVANSE 60 MG capsule refill    Phone Number Patient can be reached at: Home Phone number: [phone]    Best Time: any    Can we leave a detailed message on this number? YES    Call taken on 12/9/2019 at 3:43 PM by Chetan Moss

## 2019-12-10 NOTE — TELEPHONE ENCOUNTER
Routing refill request to provider for review/approval because:  Drug not on the [facility] refill protocol    checked - no concerns noted.    Requested Prescriptions   Pending Prescriptions Disp Refills     VYVANSE 60 MG capsule 30 capsule 0     Sig: TAKE 1 CAPSULE BY MOUTH EVERY MORNING. MUST BE 28 DAYS BETWEEN PRESCRIPTIONS -       There is no refill protocol information for this order        Emily Hampton RN

## 2019-12-11 ENCOUNTER — TRANSFERRED RECORDS (OUTPATIENT)
Dept: HEALTH INFORMATION MANAGEMENT | Facility: CLINIC | Age: 19
End: 2019-12-11

## 2019-12-11 RX ORDER — LISDEXAMFETAMINE DIMESYLATE 60 MG/1
CAPSULE ORAL
Qty: 30 CAPSULE | Refills: 0 | Status: SHIPPED | OUTPATIENT
Start: 2019-12-11 | End: 2020-01-14

## 2019-12-12 ENCOUNTER — OFFICE VISIT (OUTPATIENT)
Dept: CARDIOLOGY | Facility: CLINIC | Age: 19
End: 2019-12-12
Payer: COMMERCIAL

## 2019-12-12 VITALS
DIASTOLIC BLOOD PRESSURE: 79 MMHG | HEART RATE: 89 BPM | HEIGHT: 69 IN | RESPIRATION RATE: 24 BRPM | OXYGEN SATURATION: 100 % | SYSTOLIC BLOOD PRESSURE: 124 MMHG | WEIGHT: 235.89 LBS | BODY MASS INDEX: 34.94 KG/M2

## 2019-12-12 DIAGNOSIS — R01.0 INNOCENT HEART MURMUR: Primary | ICD-10-CM

## 2019-12-12 PROCEDURE — 99212 OFFICE O/P EST SF 10 MIN: CPT | Performed by: PEDIATRICS

## 2019-12-12 ASSESSMENT — MIFFLIN-ST. JEOR: SCORE: 2061.25

## 2019-12-12 NOTE — NURSING NOTE
"Klebo J Skjervold David's goals for this visit include: Annual f/u heart murmur, abnormal ECG    He requests these members of his care team be copied on today's visit information: yes    PCP: Enrike Neal    Referring Provider:  Enrike Neal MD  [address] [place] [address]    /79 (BP Location: Right arm, Patient Position: Sitting, Cuff Size: Adult Large)   Pulse 89   Resp 24   Ht 1.746 m (5' 8.74\")   Wt 107 kg (235 lb 14.3 oz)   SpO2 100%   BMI 35.10 kg/m        "

## 2019-12-12 NOTE — PROGRESS NOTES
"                                               PEDS Cardiac Consult Letter  Date: 2019      Enrike Neal MD  [address] [facility] [place] [address]      PATIENT: Klebo J Skjervold David  :          2000   ELIANE:          2019    Dear Dr. Neal:    Mendy is 19 years old and was seen at the [facility] on 2019.   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  's courses study will last at least another 3 to 4 years.  He remains on Vyvanse and Zoloft.  He is relatively sedentary but asymptomatic.  He smokes a half a pack of cigarettes a day.  He has not experienced chest pain, syncope or palpitations.  He is uncertain if anyone else in the family has this diagnosis of Stickler syndrome, although he believes that his mother is involved.  A maternal grandfather had a heart valve replacement and ended up with a pacemaker.  A maternal first-degree cousin had open heart surgery and ended up with a pacemaker.  There is no history of sudden unexplained death.    On physical examination his height was 1.746 m (5' 8.74\") and his weight was 107 kg (235 lb 14.3 oz).  His heart rate was 89  and respirations 24 per minute.  The blood pressure in his right arm was 124/79.  He was acyanotic, warm and well perfused. He was alert cooperative and in no distress.  His lungs were clear to auscultation without respiratory distress.  He had a regular rhythm with  no murmur.  The second heart sound was physiologically split with a normal pulmonary component.   There was no organomegaly or abdominal tenderness.  Peripheral pulses were 2+ and equal in all extremities.  There was no clubbing or edema.    An electrocardiogram performed today that I personally reviewed and explained to him showed a borderline long UT interval of 202 ms with sinus rhythm, corrected QT interval of 4 and " 23 ms, and was otherwise normal.  3 years ago he wore an ECG monitor that did not show any arrhythmias.    Mendy has borderline first-degree heart block that has been stable.  He does not need any restriction of his activities.  I requested that he return in 1 year with an electrocardiogram.    Thank you very much for your confidence in allowing me to participate in Mendy's care.  If you have any questions or concerns, please don't hesitate to contact me.    Sincerely,      Aaron Pagan M.D.   Pediatric Cardiology   [facility]  P[facility]ty Clinic  [phone]    Note: Chart documentation done in part with Dragon Voice Recognition software. Although reviewed after completion, some word and grammatical errors may remain.

## 2019-12-12 NOTE — LETTER
"  2019      RE: Klebo J Skjervold David  [address] [facility]  [place] [address]                                                      PEDS Cardiac Consult Letter  Date: 2019      Enrike Neal MD  [address] [facility] [place] [address]      PATIENT: Klebo J Skjervold David  :          2000   ELIANE:          2019    Dear Dr. Neal:    Mendy is 19 years old and was seen at the [facility] on 2019.   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   's courses study will last at least another 3 to 4 years.  He remains on Vyvanse and Zoloft.  He is relatively sedentary but asymptomatic.  He smokes a half a pack of cigarettes a day.  He has not experienced chest pain, syncope or palpitations.  He is uncertain if anyone else in the family has this diagnosis of Stickler syndrome, although he believes that his mother is involved.  A maternal grandfather had a heart valve replacement and ended up with a pacemaker.  A maternal first-degree cousin had open heart surgery and ended up with a pacemaker.  There is no history of sudden unexplained death.    On physical examination his height was 1.746 m (5' 8.74\") and his weight was 107 kg (235 lb 14.3 oz).  His heart rate was 89  and respirations 24 per minute.  The blood pressure in his right arm was 124/79.  He was acyanotic, warm and well perfused. He was alert cooperative and in no distress.  His lungs were clear to auscultation without respiratory distress.  He had a regular rhythm with  no murmur.  The second heart sound was physiologically split with a normal pulmonary component.   There was no organomegaly or abdominal tenderness.  Peripheral pulses were 2+ and equal in all extremities.  There was no clubbing or edema.    An electrocardiogram performed today that I personally reviewed and explained to " him showed a borderline long PA interval of 202 ms with sinus rhythm, corrected QT interval of 4 and 23 ms, and was otherwise normal.  3 years ago he wore an ECG monitor that did not show any arrhythmias.    Mendy has borderline first-degree heart block that has been stable.  He does not need any restriction of his activities.  I requested that he return in 1 year with an electrocardiogram.    Thank you very much for your confidence in allowing me to participate in Mendy's care.  If you have any questions or concerns, please don't hesitate to contact me.    Sincerely,      Aaron Pagan M.D.   Pediatric Cardiology   [facility]  Pediatric Specialty Clinic  [phone]    Note: Chart documentation done in part with Dragon Voice Recognition software. Although reviewed after completion, some word and grammatical errors may remain.     Aaron Pagan MD

## 2019-12-12 NOTE — PATIENT INSTRUCTIONS
Thank you for choosing [facility]. It was a pleasure to see you for your office visit today.     If you have any questions or scheduling needs during regular office hours, please call our [place] clinic: [phone]   If urgent concerns arise after hours, you can call [phone] and ask to speak to the pediatric specialist on call.   If you need to schedule Radiology tests, please call: [phone]  My Chart messages are for routine communication and questions and are usually answered within 48-72 hours. If you have an urgent concern or require sooner response, please call us.  Outside lab and imaging results should be faxed to [phone].  If you go to a lab outside of [facility] we will not automatically get those results. You will need to ask to have them faxed.       If you had any blood work, imaging or other tests completed today:  Normal test results will be mailed to your home address in a letter.  Abnormal results will be communicated to you via phone call/letter.  Please allow up to 1-2 weeks for processing and interpretation of most lab work.

## 2020-01-14 DIAGNOSIS — F90.9 ATTENTION DEFICIT HYPERACTIVITY DISORDER (ADHD), UNSPECIFIED ADHD TYPE: Chronic | ICD-10-CM

## 2020-01-14 RX ORDER — LISDEXAMFETAMINE DIMESYLATE 60 MG/1
CAPSULE ORAL
Qty: 30 CAPSULE | Refills: 0 | Status: SHIPPED | OUTPATIENT
Start: 2020-01-14 | End: 2020-02-26

## 2020-01-14 NOTE — TELEPHONE ENCOUNTER
Controlled Substance Refill Request for VYVANSE 60 MG capsule  Problem List Complete:  No     PROVIDER TO CONSIDER COMPLETION OF PROBLEM LIST AND OVERVIEW/CONTROLLED SUBSTANCE AGREEMENT    Last Written Prescription Date:  12-11-19  Last Fill Quantity: 30,   # refills: 0    Last Office Visit with [facility] primary care provider: 9-16-19    Future Office visit:     Controlled substance agreement:   Encounter-Level CSA:    There are no encounter-level csa.     Patient-Level CSA:    There are no patient-level csa.         Last Urine Drug Screen: No results found for: CDAUT, No results found for: COMDAT, No results found for: THC13, PCP13, COC13, MAMP13, OPI13, AMP13, BZO13, TCA13, MTD13, BAR13, OXY13, PPX13, BUP13     Processing:  Rx to be electronically transmitted to pharmacy by provider      [email or web address]     checked in past 3 months?  Yes 12-10-19

## 2020-01-14 NOTE — TELEPHONE ENCOUNTER
Reason for call:  Medication   If this is a refill request, has the caller requested the refill from the pharmacy already? Yes  Will the patient be using a [facility]? No  Name of the pharmacy and phone number for the current request: [facility]   [address], [place] [address]  [phone]      Name of the medication requested:    VYVANSE 60 MG capsule         Other request: Call back    Phone number to reach patient:  Home number on file [phone] (home)    Best Time:  any    Can we leave a detailed message on this number?  YES     **Patients mother was being fairly rude**

## 2020-01-27 ENCOUNTER — MYC MEDICAL ADVICE (OUTPATIENT)
Dept: FAMILY MEDICINE | Facility: CLINIC | Age: 20
End: 2020-01-27

## 2020-01-27 NOTE — LETTER
January 30, 2020          Klebo J Skjervold David,  [address]  [place] [address]          Dear Klebo J Skjervold David Dr. Lubak wanted me to reach out to check in with you. He asked me to message you to request that you complete the Patient Health Questionnaire so he can better understand if you treatment is helping with your moods. I've attached it for you to this message. Please call any time if you have any questions. Our number is: [phone]. We really care about you and want to help you in every way possible to feel better.       If you have received your health care elsewhere, please call the clinic so the information can be documented in your chart.    Please call [phone] or message us through your [employer] account to schedule an appointment or provide information for your chart.     Feel free to contact us if you have any questions or concerns!    I look forward to seeing you and working with you on your health care needs.     Sincerely,       Your [place] Care Team/

## 2020-02-05 ASSESSMENT — PATIENT HEALTH QUESTIONNAIRE - PHQ9
SUM OF ALL RESPONSES TO PHQ QUESTIONS 1-9: 12
10. IF YOU CHECKED OFF ANY PROBLEMS, HOW DIFFICULT HAVE THESE PROBLEMS MADE IT FOR YOU TO DO YOUR WORK, TAKE CARE OF THINGS AT HOME, OR GET ALONG WITH OTHER PEOPLE: SOMEWHAT DIFFICULT
SUM OF ALL RESPONSES TO PHQ QUESTIONS 1-9: 12

## 2020-02-06 ASSESSMENT — PATIENT HEALTH QUESTIONNAIRE - PHQ9: SUM OF ALL RESPONSES TO PHQ QUESTIONS 1-9: 12

## 2020-02-11 ENCOUNTER — NURSE TRIAGE (OUTPATIENT)
Dept: FAMILY MEDICINE | Facility: CLINIC | Age: 20
End: 2020-02-11

## 2020-02-11 NOTE — TELEPHONE ENCOUNTER
Reason for call:  Symptom   Symptom or request: Body Aches, headache, cough    Duration (how long have symptoms been present): 2 days  Have you been treated for this before? No    Additional comments: Mom was diagnosed with Influenza A    Phone number to reach patient:  Cell number on file:    Telephone Information:   Mobile [phone]       Best Time:  any    Can we leave a detailed message on this number?  YES

## 2020-02-26 ENCOUNTER — MYC REFILL (OUTPATIENT)
Dept: FAMILY MEDICINE | Facility: CLINIC | Age: 20
End: 2020-02-26

## 2020-02-26 DIAGNOSIS — F90.9 ATTENTION DEFICIT HYPERACTIVITY DISORDER (ADHD), UNSPECIFIED ADHD TYPE: Chronic | ICD-10-CM

## 2020-02-28 RX ORDER — LISDEXAMFETAMINE DIMESYLATE 60 MG/1
CAPSULE ORAL
Qty: 30 CAPSULE | Refills: 0 | Status: SHIPPED | OUTPATIENT
Start: 2020-02-28 | End: 2020-03-06

## 2020-02-28 NOTE — TELEPHONE ENCOUNTER
Controlled Substance Refill Request for VYVANSE 60 MG capsule  Problem List Complete:  No     PROVIDER TO CONSIDER COMPLETION OF PROBLEM LIST AND OVERVIEW/CONTROLLED SUBSTANCE AGREEMENT    Last Written Prescription Date:  1/14/2020  Last Fill Quantity: 30,   # refills: 0    THE MOST RECENT OFFICE VISIT MUST BE WITHIN THE PAST 3 MONTHS. AT LEAST ONE FACE TO FACE VISIT MUST OCCUR EVERY 6 MONTHS. ADDITIONAL VISITS CAN BE VIRTUAL.  (THIS STATEMENT SHOULD BE DELETED.)    Last Office Visit with [facility] primary care provider: 9/16/2019    Future Office visit:     Controlled substance agreement:   Encounter-Level CSA:    There are no encounter-level csa.     Patient-Level CSA:    There are no patient-level csa.         Last Urine Drug Screen: No results found for: CDAUT, No results found for: COMDAT, No results found for: THC13, PCP13, COC13, MAMP13, OPI13, AMP13, BZO13, TCA13, MTD13, BAR13, OXY13, PPX13, BUP13     Processing:  Rx to be electronically transmitted to pharmacy by provider      [email or web address]       checked in past 3 months?  No, route to RN        
Routing refill request to provider for review/approval because:  Drug not on the [facility] refill protocol   [place] reviewed:  not reviewed/not due - last done on 12/10/2019    [place]:  [email or web address]          
Detail Level: Zone

## 2020-03-02 NOTE — TELEPHONE ENCOUNTER
Called patient and informed of message below and made appointment for 03/06/2020.       Benita LIMA CMA ([facility])

## 2020-03-02 NOTE — TELEPHONE ENCOUNTER
Best would be a face to face encounter     1. Based on PHQ9 depression survey   2. Need signed Chronic Pain Contract for lisdexamfetamine (VYVANSE)     Enrike Neal MD

## 2020-03-02 NOTE — TELEPHONE ENCOUNTER
Patient completed online, with a score of 12, please advise.       Benita LIMA CMA ([facility])

## 2020-03-04 ENCOUNTER — OFFICE VISIT (OUTPATIENT)
Dept: URGENT CARE | Facility: URGENT CARE | Age: 20
End: 2020-03-04
Payer: COMMERCIAL

## 2020-03-04 VITALS
BODY MASS INDEX: 35.47 KG/M2 | TEMPERATURE: 97.6 F | SYSTOLIC BLOOD PRESSURE: 149 MMHG | DIASTOLIC BLOOD PRESSURE: 85 MMHG | OXYGEN SATURATION: 97 % | HEART RATE: 99 BPM | WEIGHT: 238.4 LBS

## 2020-03-04 DIAGNOSIS — H66.001 NON-RECURRENT ACUTE SUPPURATIVE OTITIS MEDIA OF RIGHT EAR WITHOUT SPONTANEOUS RUPTURE OF TYMPANIC MEMBRANE: Primary | ICD-10-CM

## 2020-03-04 DIAGNOSIS — J02.9 SORE THROAT: ICD-10-CM

## 2020-03-04 LAB
FLUAV+FLUBV AG SPEC QL: NEGATIVE
FLUAV+FLUBV AG SPEC QL: NEGATIVE
SPECIMEN SOURCE: NORMAL

## 2020-03-04 PROCEDURE — 87804 INFLUENZA ASSAY W/OPTIC: CPT | Mod: 59 | Performed by: NURSE PRACTITIONER

## 2020-03-04 PROCEDURE — 87651 STREP A DNA AMP PROBE: CPT | Performed by: NURSE PRACTITIONER

## 2020-03-04 PROCEDURE — 40001204 ZZHCL STATISTIC STREP A RAPID: Performed by: NURSE PRACTITIONER

## 2020-03-04 PROCEDURE — 99214 OFFICE O/P EST MOD 30 MIN: CPT | Performed by: NURSE PRACTITIONER

## 2020-03-04 RX ORDER — AZITHROMYCIN 250 MG/1
TABLET, FILM COATED ORAL
Qty: 6 TABLET | Refills: 0 | Status: SHIPPED | OUTPATIENT
Start: 2020-03-04 | End: 2020-03-06

## 2020-03-04 RX ORDER — AZITHROMYCIN 250 MG/1
TABLET, FILM COATED ORAL
Qty: 6 TABLET | Refills: 0 | Status: SHIPPED | OUTPATIENT
Start: 2020-03-04 | End: 2020-11-02

## 2020-03-04 ASSESSMENT — ENCOUNTER SYMPTOMS
DIAPHORESIS: 0
VOMITING: 0
SHORTNESS OF BREATH: 0
SORE THROAT: 1
RHINORRHEA: 1
NAUSEA: 0
FEVER: 0
COUGH: 1
CHILLS: 0
DIARRHEA: 0

## 2020-03-05 LAB
DEPRECATED S PYO AG THROAT QL EIA: NEGATIVE
SPECIMEN SOURCE: NORMAL
SPECIMEN SOURCE: NORMAL
STREP GROUP A PCR: NOT DETECTED

## 2020-03-05 NOTE — PROGRESS NOTES
SUBJECTIVE:   Klebo J Skjervold David is a 20 year old male presenting with a chief complaint of   Chief Complaint   Patient presents with     Pharyngitis     Patient complains of sore throat and plugged ears        He is an established patient of [place].    URI Adult    Onset of symptoms was 3 day(s) ago.  Course of illness is worsening.    Severity moderate  Current and Associated symptoms: chills, runny nose, stuffy nose, cough - productive, plugged ears and sore throat  Treatment measures tried include None tried.  Predisposing factors include None.      Review of Systems   Constitutional: Negative for chills, diaphoresis and fever.   HENT: Positive for congestion, rhinorrhea and sore throat. Negative for ear pain.    Respiratory: Positive for cough. Negative for shortness of breath.    Gastrointestinal: Negative for diarrhea, nausea and vomiting.   All other systems reviewed and are negative.      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     Innocent heart murmur      Myopia      Retinal degeneration      Retinal detachment      Stickler syndrome      Family History   Problem Relation Age of Onset     Allergies Mother      Obesity Mother      Thyroid Disease Mother      Diabetes Mother      Hypertension Mother      Depression/Anxiety Mother      Anesthesia Reaction Mother      Thyroid Disease Mother      Asthma Mother      Known Genetic Syndrome Mother         Sticklers     Thyroid Disease Maternal Grandmother      Hypertension Maternal Grandmother      Thyroid Disease Maternal Grandmother      Known Genetic Syndrome Maternal Grandmother         Sticklers     Alzheimer Disease Paternal Grandfather      Diabetes Paternal Grandfather       Chemical Addiction Paternal Grandfather      Diabetes Father      Cancer Maternal Grandfather      Diabetes Other      Thyroid Disease Other      Cerebrovascular Disease Other      Cancer Other      Glaucoma Other      Macular Degeneration Other      Diabetes Other      Prostate Cancer Other      Cerebrovascular Disease Other      Thyroid Disease Other      Known Genetic Syndrome Other         Sticklers     Cerebrovascular Disease Other      Known Genetic Syndrome Other         Sticklers     Current Outpatient Medications   Medication Sig Dispense Refill     azithromycin (ZITHROMAX) 250 MG tablet Two tablets first day, then one tablet daily for four days. 6 tablet 0     azithromycin (ZITHROMAX) 250 MG tablet Two tablets first day, then one tablet daily for four days. 6 tablet 0     cetirizine HCl 10 MG CAPS Take 1 capsule by mouth daily as needed Needs to be seen b/4 further refills for allergies 90 capsule 4     fluticasone (FLONASE) 50 MCG/ACT nasal spray Spray 1-2 sprays into both nostrils daily 16 g 0     melatonin 5 MG tablet Take 5 mg by mouth nightly as needed for sleep       sertraline (ZOLOFT) 50 MG tablet Take 1 tablet (50 mg) by mouth daily 90 tablet 1     Vitamin D, Cholecalciferol, 1000 units TABS Take 1 tablet by mouth daily        VYVANSE 60 MG capsule TAKE 1 CAPSULE BY MOUTH EVERY MORNING. MUST BE 28 DAYS BETWEEN PRESCRIPTIONS - 30 capsule 0     Social History     Tobacco Use     Smoking status: Current Every Day Smoker     Packs/day: 1.00     Types: Cigarettes     Smokeless tobacco: Never Used   Substance Use Topics     Alcohol use: No       OBJECTIVE  BP (!) 149/85 (BP Location: Left arm, Patient Position: Chair, Cuff Size: Adult Regular)   Pulse 99   Temp 97.6  F (36.4  C) (Oral)   Wt 108.1 kg (238 lb 6.4 oz)   SpO2 97%   BMI 35.47 kg/m      Physical Exam  Vitals signs and nursing note reviewed.   Constitutional:       Appearance: He is well-developed. He is ill-appearing. He is not  diaphoretic.   HENT:      Head: Normocephalic and atraumatic.      Right Ear: External ear normal. Tympanic membrane is erythematous (suppurative ) and bulging.      Left Ear: Tympanic membrane and external ear normal.   Eyes:      Pupils: Pupils are equal, round, and reactive to light.   Neck:      Musculoskeletal: Normal range of motion and neck supple.   Pulmonary:      Effort: Pulmonary effort is normal. No respiratory distress.      Breath sounds: Normal breath sounds.   Lymphadenopathy:      Cervical: No cervical adenopathy.   Skin:     General: Skin is warm and dry.   Neurological:      Mental Status: He is alert.      Cranial Nerves: No cranial nerve deficit.         Labs:  Results for orders placed or performed in visit on 03/04/20 (from the past 24 hour(s))   Streptococcus A Rapid Scr w Reflx to PCR   Result Value Ref Range    Strep Specimen Description Throat     Streptococcus Group A Rapid Screen Negative NEG^Negative   Influenza A/B antigen   Result Value Ref Range    Influenza A/B Agn Specimen Nasal     Influenza A Negative NEG^Negative    Influenza B Negative NEG^Negative       ASSESSMENT:      ICD-10-CM    1. Non-recurrent acute suppurative otitis media of right ear without spontaneous rupture of tympanic membrane H66.001 azithromycin (ZITHROMAX) 250 MG tablet     azithromycin (ZITHROMAX) 250 MG tablet   2. Sore throat J02.9 Streptococcus A Rapid Scr w Reflx to PCR     Influenza A/B antigen     Group A Streptococcus PCR Throat Swab        PLAN:  Plan of treatment is discussed.The benefits, risks and potential side effects of medications discussed. Black box warning discussed as relevant.  All questions are answered.  Instructions were given  to follow up immediately if any adverse reactions or worsening symptoms develop.   Understanding of plan of care was verbalized by patient    Patient Instructions     Patient Education     Middle Ear Infection (Otitis Media) in Adults  What is a middle ear  infection?  A middle ear infection occurs behind the eardrum. It is most often caused by a virus or bacteria. Most kids have at least one middle ear infection by the time they are 3 years old. But adults can also get them.  What causes middle ear infections?  Inflammation in the middle ear most often starts after you ve had a sore throat, cold, or other upper respiratory problem. The infection spreads to the middle ear and causes fluid buildup behind the eardrum.   What are the symptoms of a middle ear infection?  These are the most common symptoms of middle ear infections in adults:    Ear pain    Feeling of fullness in the hear    Fluid draining from the ear    Fever    Hearing loss  These symptoms may look like other conditions or health problems. Always talk with your healthcare provider for a diagnosis.  How is a middle ear infection diagnosed?  Your healthcare provider will review your health history and do a physical exam. He or she will check the outer ear and the eardrum using an otoscope. The otoscope is a lighted tool that lets the healthcare provider see inside the ear. A pneumatic otoscope blows a puff of air into the ear to test eardrum movement. When there is fluid or infection in the middle ear, movement is decreased.  Your provider may also do a tympanometry. This is a test that directs air and sound to the middle ear.  If you have ear infections often, your healthcare provider may suggest having a hearing test.  How is a middle ear infection treated?  Treatment will depend on your symptoms, age, and general health. It will also depend on how severe the condition is.  Treatment may include:    Antibiotics    Pain relievers    Placing small tubes in the eardrum for chronic ear infections   What are possible complications of a middle ear infection?  Untreated ear infections can lead to:    Infection in other parts of the head    Lasting (permanent) hearing loss    Speech and language problems  Can  middle ear infections be prevented?  Cold and allergy medicines don't seem to prevent ear infections. And currently there is no vaccine that can prevent the disease. But check with your healthcare provider and make sure your vaccines are up-to-date. Living in a home where cigarettes are smoked can increase the chances of ear infections.  Key points about middle ear infections    Middle ear infections can affect both children and adults.    Pain and fever can be the most common symptoms.    Without treatment, permanent hearing loss may happen.    Take antibiotics as prescribed and finish all of the prescription. This can help prevent antibiotic-resistant infections or incomplete treatment with the infection returning.    Next steps  Tips to help you get the most from a visit to your healthcare provider:    Know the reason for your visit and what you want to happen.    Before your visit, write down questions you want answered.    Bring someone with you to help you ask questions and remember what your provider tells you.    At the visit, write down the name of a new diagnosis, and any new medicines, treatments, or tests. Also write down any new instructions your provider gives you.    Know why a new medicine or treatment is prescribed, and how it will help you. Also know what the side effects are.    Ask if your condition can be treated in other ways.    Know why a test or procedure is recommended and what the results could mean.    Know what to expect if you do not take the medicine or have the test or procedure.    If you have a follow-up appointment, write down the date, time, and purpose for that visit.    Know how you can contact your provider if you have questions.      [id number] [employer]. [address] [place] [address], [place] [address]. All rights reserved. This information is not intended as a substitute for professional medical care. Always follow your healthcare professional's instructions.

## 2020-03-05 NOTE — PATIENT INSTRUCTIONS
Patient Education     Middle Ear Infection (Otitis Media) in Adults  What is a middle ear infection?  A middle ear infection occurs behind the eardrum. It is most often caused by a virus or bacteria. Most kids have at least one middle ear infection by the time they are 3 years old. But adults can also get them.  What causes middle ear infections?  Inflammation in the middle ear most often starts after you ve had a sore throat, cold, or other upper respiratory problem. The infection spreads to the middle ear and causes fluid buildup behind the eardrum.   What are the symptoms of a middle ear infection?  These are the most common symptoms of middle ear infections in adults:    Ear pain    Feeling of fullness in the hear    Fluid draining from the ear    Fever    Hearing loss  These symptoms may look like other conditions or health problems. Always talk with your healthcare provider for a diagnosis.  How is a middle ear infection diagnosed?  Your healthcare provider will review your health history and do a physical exam. He or she will check the outer ear and the eardrum using an otoscope. The otoscope is a lighted tool that lets the healthcare provider see inside the ear. A pneumatic otoscope blows a puff of air into the ear to test eardrum movement. When there is fluid or infection in the middle ear, movement is decreased.  Your provider may also do a tympanometry. This is a test that directs air and sound to the middle ear.  If you have ear infections often, your healthcare provider may suggest having a hearing test.  How is a middle ear infection treated?  Treatment will depend on your symptoms, age, and general health. It will also depend on how severe the condition is.  Treatment may include:    Antibiotics    Pain relievers    Placing small tubes in the eardrum for chronic ear infections   What are possible complications of a middle ear infection?  Untreated ear infections can lead to:    Infection in other  parts of the head    Lasting (permanent) hearing loss    Speech and language problems  Can middle ear infections be prevented?  Cold and allergy medicines don't seem to prevent ear infections. And currently there is no vaccine that can prevent the disease. But check with your healthcare provider and make sure your vaccines are up-to-date. Living in a home where cigarettes are smoked can increase the chances of ear infections.  Key points about middle ear infections    Middle ear infections can affect both children and adults.    Pain and fever can be the most common symptoms.    Without treatment, permanent hearing loss may happen.    Take antibiotics as prescribed and finish all of the prescription. This can help prevent antibiotic-resistant infections or incomplete treatment with the infection returning.    Next steps  Tips to help you get the most from a visit to your healthcare provider:    Know the reason for your visit and what you want to happen.    Before your visit, write down questions you want answered.    Bring someone with you to help you ask questions and remember what your provider tells you.    At the visit, write down the name of a new diagnosis, and any new medicines, treatments, or tests. Also write down any new instructions your provider gives you.    Know why a new medicine or treatment is prescribed, and how it will help you. Also know what the side effects are.    Ask if your condition can be treated in other ways.    Know why a test or procedure is recommended and what the results could mean.    Know what to expect if you do not take the medicine or have the test or procedure.    If you have a follow-up appointment, write down the date, time, and purpose for that visit.    Know how you can contact your provider if you have questions.      [id number] [employer]. [address] [place] [address], [place] [address]. All rights reserved. This information is not intended as a substitute for  professional medical care. Always follow your healthcare professional's instructions.

## 2020-03-06 ENCOUNTER — OFFICE VISIT (OUTPATIENT)
Dept: INTERNAL MEDICINE | Facility: CLINIC | Age: 20
End: 2020-03-06
Payer: COMMERCIAL

## 2020-03-06 VITALS
BODY MASS INDEX: 34.8 KG/M2 | HEIGHT: 69 IN | SYSTOLIC BLOOD PRESSURE: 136 MMHG | DIASTOLIC BLOOD PRESSURE: 76 MMHG | WEIGHT: 235 LBS | HEART RATE: 91 BPM | RESPIRATION RATE: 18 BRPM | OXYGEN SATURATION: 99 % | TEMPERATURE: 98.4 F

## 2020-03-06 DIAGNOSIS — Q89.8 STICKLER SYNDROME: Primary | ICD-10-CM

## 2020-03-06 DIAGNOSIS — J02.9 SORE THROAT: ICD-10-CM

## 2020-03-06 DIAGNOSIS — H91.93 BILATERAL HEARING LOSS, UNSPECIFIED HEARING LOSS TYPE: ICD-10-CM

## 2020-03-06 DIAGNOSIS — H61.23 BILATERAL IMPACTED CERUMEN: ICD-10-CM

## 2020-03-06 DIAGNOSIS — F90.9 ATTENTION DEFICIT HYPERACTIVITY DISORDER (ADHD), UNSPECIFIED ADHD TYPE: ICD-10-CM

## 2020-03-06 DIAGNOSIS — F33.1 MAJOR DEPRESSIVE DISORDER, RECURRENT EPISODE, MODERATE (H): Chronic | ICD-10-CM

## 2020-03-06 PROCEDURE — 69209 REMOVE IMPACTED EAR WAX UNI: CPT | Mod: 50 | Performed by: INTERNAL MEDICINE

## 2020-03-06 PROCEDURE — 99214 OFFICE O/P EST MOD 30 MIN: CPT | Mod: 25 | Performed by: INTERNAL MEDICINE

## 2020-03-06 RX ORDER — LISDEXAMFETAMINE DIMESYLATE 60 MG/1
CAPSULE ORAL
Qty: 30 CAPSULE | Refills: 0 | Status: SHIPPED | OUTPATIENT
Start: 2020-03-06 | End: 2020-03-28

## 2020-03-06 ASSESSMENT — MIFFLIN-ST. JEOR: SCORE: 2052.36

## 2020-03-06 NOTE — PROGRESS NOTES
Subjective     Klebo J Skjervold David is a 20 year old male who presents to clinic today for the following health issues:    HPI   Chief Complaint   Patient presents with     Recheck Medication     vyvanse, depression     Otitis Media     [place] ear pain, plugged      Referrals/continued Care/healthcare     audiologist, ENT        Stickler syndrome  Bilateral hearing loss, unspecified hearing loss type  Sore throat  Attention deficit hyperactivity disorder (ADHD), unspecified ADHD type  Bilateral impacted cerumen  Major depressive disorder, recurrent episode, moderate (H)     ED/UC Followup:    Facility:  [facility]  Date of visit: 03/04/2020  Reason for visit: sore throat   Current Status: still having symptoms      Last week, not well, since Monday sore throat, and symptoms worse and worse , hard swallowing or eat. Then right ear began hurting. Ear pain began at 8 o'clock but got bad enough he felt urgent care evaluation was necessary due to severity of pain. Worried about possible Streptococcus . Given azithromycin 5 day course. Got hard to hear just before this. Tried to clean wax out of the right ear but at urgent care clinic no wax appreciated. Patient has bilateral hearing aids. Needs some follow up care for his Stickler syndrome diagnosis.    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, ou (PQ)     Sleep disorder, circadian, delayed sleep phase type     PETRA (generalized anxiety disorder)     Past Surgical History:   Procedure Laterality Date     CIRCUMCISION       EYE SURGERY  2008     Laser retinas both eyes     frenulectomy       GENITOURINARY SURGERY  2001    Circumcision     H ARGON LASER FOR RETINAL TEAR      both eyes (PQ)     HERNIA REPAIR, INGUINAL RT/LT      Hernia Repair, Femoral RT/LT     PE TUBES       RETINAL REATTACHMENT       supranumery tooth extraction       TONSILLECTOMY & ADENOIDECTOMY         Social History     Tobacco Use     Smoking status: Current Every Day Smoker     Packs/day: 1.00     Types: Cigarettes     Smokeless tobacco: Never Used   Substance Use Topics     Alcohol use: No     Family History   Problem Relation Age of Onset     Allergies Mother      Obesity Mother      Thyroid Disease Mother      Diabetes Mother      Hypertension Mother      Depression/Anxiety Mother      Anesthesia Reaction Mother      Thyroid Disease Mother      Asthma Mother      Known Genetic Syndrome Mother         Sticklers     Thyroid Disease Maternal Grandmother      Hypertension Maternal Grandmother      Thyroid Disease Maternal Grandmother      Known Genetic Syndrome Maternal Grandmother         Sticklers     Alzheimer Disease Paternal Grandfather      Diabetes Paternal Grandfather      Chemical Addiction Paternal Grandfather      Diabetes Father      Cancer Maternal Grandfather      Diabetes Other      Thyroid Disease Other      Cerebrovascular Disease Other      Cancer Other      Glaucoma Other      Macular Degeneration Other      Diabetes Other      Prostate Cancer Other      Cerebrovascular Disease Other      Thyroid Disease Other      Known Genetic Syndrome Other         Sticklers     Cerebrovascular Disease Other      Known Genetic Syndrome Other         Sticklers         Current Outpatient Medications   Medication Sig Dispense Refill     azithromycin (ZITHROMAX) 250 MG tablet Two tablets first day, then one tablet daily for four days. 6 tablet 0     cetirizine HCl 10 MG CAPS Take 1 capsule by mouth daily as needed Needs to be seen b/4 further refills for allergies 90 capsule 4      "fluticasone (FLONASE) 50 MCG/ACT nasal spray Spray 1-2 sprays into both nostrils daily 16 g 0     melatonin 5 MG tablet Take 5 mg by mouth nightly as needed for sleep       sertraline (ZOLOFT) 50 MG tablet Take 1 tablet (50 mg) by mouth daily 90 tablet 1     Vitamin D, Cholecalciferol, 1000 units TABS Take 1 tablet by mouth daily        VYVANSE 60 MG capsule TAKE 1 CAPSULE BY MOUTH EVERY MORNING. MUST BE 28 DAYS BETWEEN PRESCRIPTIONS - 30 capsule 0     Allergies   Allergen Reactions     Augmentin      Sulfamethoxazole Rash     Face & Body rash     BP Readings from Last 3 Encounters:   03/06/20 136/76   03/04/20 (!) 149/85   12/12/19 124/79    Wt Readings from Last 3 Encounters:   03/06/20 106.6 kg (235 lb)   03/04/20 108.1 kg (238 lb 6.4 oz)   12/12/19 107 kg (235 lb 14.3 oz)              Reviewed and updated as needed this visit by Provider          Goes to [facility]. He feels his mood is all over the map. No connection with counseling psychologist . Stopped seeing a female counselor formally but they remain in loose email contact. Patient feels he has some support and is generally doing well.    Review of Systems   ROS COMP: Constitutional, HEENT, cardiovascular, pulmonary, gi and gu systems are negative, except as otherwise noted.      Objective    Pulse 91   Temp 98.4  F (36.9  C) (Oral)   Resp 18   Ht 1.746 m (5' 8.75\")   Wt 106.6 kg (235 lb)   SpO2 99%   BMI 34.96 kg/m    Body mass index is 34.96 kg/m .  Physical Exam   GENERAL: healthy, alert and no distress, nontoxic general appearance , appears well  EYES: Eyes grossly normal to inspection, PERRL and conjunctivae and sclerae normal  HENT: ear canals and TM's abnormal, entirely blocked with ear wax impactions bilateral, nose and mouth without ulcers or lesions  NECK: no adenopathy, no asymmetry, masses, or scars and thyroid normal to palpation  MS: no gross musculoskeletal defects noted, no edema  NEURO: Normal " strength and tone, mentation intact and speech normal  PSYCH: mentation appears normal, affect normal/bright    Diagnostic Test Results:  Orders Placed This Encounter   Procedures     REMOVE IMPACTED CERUMEN     OTOLARYNGOLOGY REFERRAL             Assessment & Plan     (Q89.8) Stickler syndrome  (primary encounter diagnosis)  Comment: desires a review / care for his chronic syndrome  Plan: OTOLARYNGOLOGY REFERRAL            (H91.93) Bilateral hearing loss, unspecified hearing loss type  Comment: part and parcel of the review he will receive for the Stickler's diagnosis. His current hearing aids are roughly 4-5 years old and he wonders if he needs new  Plan: OTOLARYNGOLOGY REFERRAL            (J02.9) Sore throat  Comment: resolving  Plan:     (F90.9) Attention deficit hyperactivity disorder (ADHD), unspecified ADHD type  Comment: refills provided, new Chronic Pain Contract for stimulant medication is signed. I think a follow up appointment in 6 months is adequate at this point   Plan: VYVANSE 60 MG capsule            (H61.23) Bilateral impacted cerumen  Comment: successfully irrigated out by my medical assistant    Plan: REMOVE IMPACTED CERUMEN            (F33.1) Major depressive disorder, recurrent episode, moderate (H)  Comment: refills provided , generally doing well   Plan: sertraline (ZOLOFT) 50 MG tablet            No follow-ups on file.    Enrike Neal MD  [facility]

## 2020-03-06 NOTE — LETTER
[facility]  03/06/20    Patient: Klebo J Skjervold David  YOB: 2000  Medical Record Number: [id number]                                                                  Opioid / Opioid Plus Controlled Substance Agreement    I understand that my care provider has prescribed a (stimulant medication) controlled substance to help manage my condition(s). I am taking this medicine to help me function or work. I know this is strong medicine, and that it can cause serious side effects. Opioid medicine can be sedating, addicting and may cause a dependency on the drug. They can affect my ability to drive or think, and cause depression. They need to be taken exactly as prescribed. Combining opioids with certain medicines or chemicals (such as cocaine, sedatives and tranquilizers, sleeping pills, meth) can be dangerous or even fatal. Also, if I stop opioids suddenly, I may have severe withdrawal symptoms. Last, I understand that opioids do not work for all types of pain nor for all patients. If not helpful, I may be asked to stop them.      The risks, benefits, and side effects of these medicine(s) were explained to me. I agree that:    1. I will take part in other treatments as advised by my care team. This may be psychiatry or counseling, physical therapy, behavioral therapy, group treatment or a referral to a pain clinic. I will reduce or stop my medicine when my care team tells me to do so.  2. I will take my medicines as prescribed. I will not change the dose or schedule unless my care team tells me to. There will be no refills if I  run out early.   I may be contactedwithout warning and asked to complete a urine drug test or pill count at any time.   3. I will keep all my appointments, and understand this is part of the monitoring of opioids. My care team may require an office visit for EVERY opioid/controlled substance refill. If I miss appointments or don t follow instructions, my care team  may stop my medicine.  4. I will not ask other providers to prescribe controlled substances, and I will not accept controlled substances from other people. If I need another prescribed controlled substance for a new reason, I will tell my care team within 1 business day.  5. I will use one pharmacy to fill all of my controlled substance prescriptions, and it is up to me to make sure that I do not run out of my medicines on weekends or holidays. If my care team is willing to refill my opioid prescription without a visit, I must request refills only during office hours, refills may take up to 3 days to process, and it may take up to 5 to 7 days for my medicine to be mailed and ready at my pharmacy. Prescriptions will not be mailed anywhere except my pharmacy.        [id number]  Rev 12/18         Registration to scan to EHR                             Page 1 of 2               Controlled Substance Agreement Opioid        [facility]  03/06/20  Patient: Klebo J Skjervold David  YOB: 2000  Medical Record Number: [id number]                                                                  6. I am responsible for my prescriptions. If the medicine/prescription is lost or stolen, it will not be replaced. I also agree not to share controlled substance medicines with anyone.  7. I agree to not use ANY illegal or recreational drugs. This includes marijuana, cocaine, bath salts or other drugs. I agree not to use alcohol unless my care team says I may.          I agree to give urine samples whenever asked. If I don t give a urine sample, the care team may stop my medicine.    8. If I enroll in the [place] Medical Marijuana program, I will tell my care team. I will also sign an agreement to share my medical records with my care team.   9. I will bring in my list of medicines (or my medicine bottles) each time I come to the clinic.   10. I will tell my care team right away if I become pregnant or have a new  medical problem treated outside of my regular clinic.  11. I understand that this medicine can affect my thinking and judgment. It may be unsafe for me to drive, use machinery and do dangerous tasks. I will not do any of these things until I know how the medicine affects me. If my dose changes, I will wait to see how it affects me. I will contact my care team if I have concerns about medicine side effects.    I understand that if I do not follow any of the conditions above, my prescriptions or treatment may be stopped.      I agree that my provider, clinic care team, and pharmacy may work with any city, state or federal law enforcement agency that investigates the misuse, sale, or other diversion of my controlled medicine. I will allow my provider to discuss my care with or share a copy of this agreement with any other treating provider, pharmacy or emergency room where I receive care. I agree to give up (waive) any right of privacy or confidentiality with respect to these consents.     I have read this agreement and have asked questions about anything I did not understand.      ________________________________________________________________________  Patient signature - Date/Time -  Klebo J Skjervold David                                      ________________________________________________________________________  Witness signature                                                            ________________________________________________________________________  Provider signature - Enrike Neal MD      [id number]  Rev 12/18         Registration to scan to EHR                         Page 2 of 2                   Controlled Substance Agreement Opioid           Page 1 of 2  Opioid Pain Medicines (also known as Narcotics)  What You Need to Know    What are opioids?   Opioids are pain medicines that must be prescribed by a doctor.  They are also known as narcotics.    Examples are:     morphine (MS Contin,  Machelle)    oxycodone (Oxycontin)    oxycodone and acetaminophen (Percocet)    hydrocodone and acetaminophen (Vicodin, Norco)     fentanyl patch (Duragesic)     hydromorphone (Dilaudid)     methadone     What do opioids do well?   Opioids are best for short-term pain after a surgery or injury. They also work well for cancer pain. Unlike other pain medicines, they do not cause liver or kidney failure or ulcers. They may help some people with long-lasting (chronic) pain.     What do opioids NOT do well?   Opioids never get rid of pain entirely, and they do not work well for most patients with chronic pain. Opioids do not reduce swelling, one of the causes of pain. They also don t work well for nerve pain.                           For informational purposes only.  Not to replace the advice of your care provider.  Copyright 201 [employer]. All right reserved. [employer] 061998-Agx 02/18.      Page 2 of 2    Risks and side effects   Talk to your doctor before you start or decide to keep taking one of these medicines. Side effects include:    Lowering your breathing rate enough to cause death    Overdose, including death, especially if taking higher than prescribed doses    Long-term opioid use    Worse depression symptoms; less pleasure in things you usually enjoy    Feeling tired or sluggish    Slower thoughts or cloudy thinking    Being more sensitive to pain over time; pain is harder to control    Trouble sleeping or restless sleep    Changes in hormone levels (for example, less testosterone)    Changes in sex drive or ability to have sex    Constipation    Unsafe driving    Itching and sweating    Feeling dizzy    Nausea, vomiting and dry mouth    What else should I know about opioids?  When someone takes opioids for too long or too often, they become dependent. This means that if you stop or reduce the medicine too quickly, you will have withdrawal symptoms.    Dependence is not the same as addiction.  Addiction is when people keep using a substance that harms their body, their mind or their relations with others. If you have a history of drug or alcohol abuse, taking opioids can cause a relapse.    Over time, opioids don t work as well. Most people will need higher and higher doses. The higher the dose, the more serious the side effects. We don t know the long-term effects of opioids.      Prescribed opioids aren't the best way to manage chronic pain    Other ways to manage pain include:      Ibuprofen or acetaminophen.  You should always try this first.      Treat health problems that may be causing pain.      acupuncture or massage, deep breathing, meditation, visual imagery, aromatherapy.      Use heat or ice at the pain site      Physical therapy and exercise      Stop smoking      See a counselor or therapist                                                  People who have used opioids for a long time may have a lower quality of life, worse depression, higher levels of pain and more visits to doctors.    Never share your opioids with others. Be sure to store opioids in a secure place, locked if possible.Young children can easily swallow them and overdose.     You can overdose on opioids.  Signs of overdose include decrease or loss of consciousness, slowed breathing, trouble waking and blue lips.  If someone is worried about overdose, they should call 911.    If you are at risk for overdose, you may get naloxone (Narcan, a medicine that reverses the effects of opioids.  If you overdose, a friend or family member can give you Narcan while waiting for the ambulance.  They need to know the signs of overdose and how to give Narcan.    While you're taking opioids:    Don't use alcohol or street drugs. Taking them together can cause death.    Don't take any of these medicines unless your doctor says its okay.  Taking these with opioids can cause death.    Benzodiazepines (such as lorazepam         or  diazepam)    Muscle relaxers (such as cyclobenzaprine)    sleeping pills    other opioids    Safe disposal of opioids  Find your area drug take-back program, your pharmacy mail-back program, buy a special disposal bag (such as Deterra) from your pharmacy or flush them down the toilet.  Use the guidelines at:  www.fda.gov/drugs/resourcesforyou

## 2020-03-06 NOTE — LETTER
[facility]  03/06/20    Patient: Klebo J Skjervold David  YOB: 2000  Medical Record Number: [id number]                                                                  Stimulant medication Controlled Substance Agreement    I understand that my care provider has prescribed a stimulant medication controlled substance to help manage my condition(s). I am taking this medicine to help me function or work. I know this is strong medicine, and that it can cause serious side effects.      The risks, benefits, and side effects of these medicine(s) were explained to me. I agree that:    1. I will take part in other treatments as advised by my care team. This may be psychiatry or counseling, physical therapy, behavioral therapy, group treatment or a referral to a pain clinic. I will reduce or stop my medicine when my care team tells me to do so.  2. I will take my medicines as prescribed. I will not change the dose or schedule unless my care team tells me to. There will be no refills if I  run out early.   I may be contactedwithout warning and asked to complete a urine drug test or pill count at any time.   3. I will keep all my appointments, and understand this is part of the monitoring of opioids. My care team may require an office visit for EVERY opioid/controlled substance refill. If I miss appointments or don t follow instructions, my care team may stop my medicine.  4. I will not ask other providers to prescribe controlled substances, and I will not accept controlled substances from other people. If I need another prescribed controlled substance for a new reason, I will tell my care team within 1 business day.  5. I will use one pharmacy to fill all of my controlled substance prescriptions, and it is up to me to make sure that I do not run out of my medicines on weekends or holidays. If my care team is willing to refill my opioid prescription without a visit, I must request refills only during  office hours, refills may take up to 3 days to process, and it may take up to 5 to 7 days for my medicine to be mailed and ready at my pharmacy. Prescriptions will not be mailed anywhere except my pharmacy.        [id number]  Rev 12/18         Registration to scan to EHR                             Page 1 of 2               Controlled Substance Agreement stimulant medication         [facility]  03/06/20  Patient: Klebo J Skjervold David  YOB: 2000  Medical Record Number: [id number]                                                                  6. I am responsible for my prescriptions. If the medicine/prescription is lost or stolen, it will not be replaced. I also agree not to share controlled substance medicines with anyone.  7. I agree to not use ANY illegal or recreational drugs. This includes marijuana, cocaine, bath salts or other drugs. I agree not to use alcohol unless my care team says I may.          I agree to give urine samples whenever asked. If I don t give a urine sample, the care team may stop my medicine.    8. If I enroll in the [place] Medical Marijuana program, I will tell my care team. I will also sign an agreement to share my medical records with my care team.   9. I will bring in my list of medicines (or my medicine bottles) each time I come to the clinic.   10. I will tell my care team right away if I become pregnant or have a new medical problem treated outside of my regular clinic.  11. I understand that this medicine can affect my thinking and judgment. It may be unsafe for me to drive, use machinery and do dangerous tasks. I will not do any of these things until I know how the medicine affects me. If my dose changes, I will wait to see how it affects me. I will contact my care team if I have concerns about medicine side effects.    I understand that if I do not follow any of the conditions above, my prescriptions or treatment may be stopped.      I agree that my  provider, clinic care team, and pharmacy may work with any city, state or federal law enforcement agency that investigates the misuse, sale, or other diversion of my controlled medicine. I will allow my provider to discuss my care with or share a copy of this agreement with any other treating provider, pharmacy or emergency room where I receive care. I agree to give up (waive) any right of privacy or confidentiality with respect to these consents.     I have read this agreement and have asked questions about anything I did not understand.      ________________________________________________________________________  Patient signature - Date/Time -  Klebo J Skjervold Juan Jose                                      ________________________________________________________________________  Witness signature                                                            ________________________________________________________________________  Provider signature Oswaldo Neal MD      [id number]  Rev 12/18         Registration to scan to EHR                         Page 2 of 2                   Controlled Substance Agreement Opioid

## 2020-03-28 ENCOUNTER — MYC REFILL (OUTPATIENT)
Dept: INTERNAL MEDICINE | Facility: CLINIC | Age: 20
End: 2020-03-28

## 2020-03-28 DIAGNOSIS — F90.9 ATTENTION DEFICIT HYPERACTIVITY DISORDER (ADHD), UNSPECIFIED ADHD TYPE: ICD-10-CM

## 2020-03-30 RX ORDER — LISDEXAMFETAMINE DIMESYLATE 60 MG/1
CAPSULE ORAL
Qty: 30 CAPSULE | Refills: 0 | Status: SHIPPED | OUTPATIENT
Start: 2020-03-30 | End: 2020-05-03

## 2020-03-30 NOTE — TELEPHONE ENCOUNTER
Routing refill request to provider for review/approval because:  Rx not on RN refill protocol   checked - last dispensed on 2/28/2020    Requested Prescriptions   Pending Prescriptions Disp Refills     VYVANSE 60 MG capsule 30 capsule 0     Sig: TAKE 1 CAPSULE BY MOUTH EVERY MORNING. MUST BE 28 DAYS BETWEEN PRESCRIPTIONS -       There is no refill protocol information for this order        Emily Hampton RN

## 2020-03-30 NOTE — TELEPHONE ENCOUNTER
Controlled Substance Refill Request for VYVANSE 60 MG capsule   Problem List Complete:  No     PROVIDER TO CONSIDER COMPLETION OF PROBLEM LIST AND OVERVIEW/CONTROLLED SUBSTANCE AGREEMENT    Last Written Prescription Date:  3-6-2020  Last Fill Quantity: 30,   # refills: 0    THE MOST RECENT OFFICE VISIT MUST BE WITHIN THE PAST 3 MONTHS. AT LEAST ONE FACE TO FACE VISIT MUST OCCUR EVERY 6 MONTHS. ADDITIONAL VISITS CAN BE VIRTUAL.  (THIS STATEMENT SHOULD BE DELETED.)    [place] [facility] [place]    Future Office visit:     Controlled substance agreement:   Encounter-Level CSA:    There are no encounter-level csa.     Patient-Level CSA:    Controlled Substance Agreement - Opioid - Scan on 3/6/2020  4:38 PM         Last Urine Drug Screen: No results found for: CDAUT, No results found for: COMDAT, No results found for: THC13, PCP13, COC13, MAMP13, OPI13, AMP13, BZO13, TCA13, MTD13, BAR13, OXY13, PPX13, BUP13       [email or web address]       checked in past 3 months?  No, route to RN

## 2020-05-03 ENCOUNTER — MYC REFILL (OUTPATIENT)
Dept: INTERNAL MEDICINE | Facility: CLINIC | Age: 20
End: 2020-05-03

## 2020-05-03 DIAGNOSIS — F90.9 ATTENTION DEFICIT HYPERACTIVITY DISORDER (ADHD), UNSPECIFIED ADHD TYPE: ICD-10-CM

## 2020-05-05 NOTE — TELEPHONE ENCOUNTER
reviewed. No concerns. Last dispensed 3/28/20 #30 for a 30 day supply.    Sandee Shin RN  [facility]

## 2020-05-06 RX ORDER — LISDEXAMFETAMINE DIMESYLATE 60 MG/1
CAPSULE ORAL
Qty: 30 CAPSULE | Refills: 0 | Status: SHIPPED | OUTPATIENT
Start: 2020-05-06 | End: 2020-06-06

## 2020-06-06 ENCOUNTER — MYC REFILL (OUTPATIENT)
Dept: INTERNAL MEDICINE | Facility: CLINIC | Age: 20
End: 2020-06-06

## 2020-06-06 DIAGNOSIS — F90.9 ATTENTION DEFICIT HYPERACTIVITY DISORDER (ADHD), UNSPECIFIED ADHD TYPE: ICD-10-CM

## 2020-06-08 RX ORDER — LISDEXAMFETAMINE DIMESYLATE 60 MG/1
CAPSULE ORAL
Qty: 30 CAPSULE | Refills: 0 | Status: SHIPPED | OUTPATIENT
Start: 2020-06-08 | End: 2020-07-10

## 2020-06-08 NOTE — TELEPHONE ENCOUNTER
Routing refill request to provider for review/approval because:  Drug not on the [facility] refill protocol     RX [place] reviewed:  reviewed- no concerns. Last dispensed 05/06/2020 for #30    [place]  [email or web address][email or web address]

## 2020-06-08 NOTE — TELEPHONE ENCOUNTER
Controlled Substance Refill Request for VYVANSE 60 MG capsule  Problem List Complete:  No     PROVIDER TO CONSIDER COMPLETION OF PROBLEM LIST AND OVERVIEW/CONTROLLED SUBSTANCE AGREEMENT    Last Written Prescription Date:  5/6/2020  Last Fill Quantity: 30,   # refills: 0    THE MOST RECENT OFFICE VISIT MUST BE WITHIN THE PAST 3 MONTHS. AT LEAST ONE FACE TO FACE VISIT MUST OCCUR EVERY 6 MONTHS. ADDITIONAL VISITS CAN BE VIRTUAL.  (THIS STATEMENT SHOULD BE DELETED.)    [place] [facility] [place]: 3/6/2020  Future Office visit:     Controlled substance agreement:   Encounter-Level CSA:    There are no encounter-level csa.     Patient-Level CSA:    Controlled Substance Agreement - Opioid - Scan on 3/6/2020  4:38 PM         Last Urine Drug Screen: No results found for: CDAUT, No results found for: COMDAT, No results found for: THC13, PCP13, COC13, MAMP13, OPI13, AMP13, BZO13, TCA13, MTD13, BAR13, OXY13, PPX13, BUP13     Processing:  Rx to be electronically transmitted to pharmacy by provider      [email or web address]       checked in past 3 months?  No, route to RN

## 2020-07-10 ENCOUNTER — MYC REFILL (OUTPATIENT)
Dept: INTERNAL MEDICINE | Facility: CLINIC | Age: 20
End: 2020-07-10

## 2020-07-10 DIAGNOSIS — F90.9 ATTENTION DEFICIT HYPERACTIVITY DISORDER (ADHD), UNSPECIFIED ADHD TYPE: ICD-10-CM

## 2020-07-10 RX ORDER — LISDEXAMFETAMINE DIMESYLATE 60 MG/1
CAPSULE ORAL
Qty: 30 CAPSULE | Refills: 0 | Status: SHIPPED | OUTPATIENT
Start: 2020-07-10 | End: 2020-11-02

## 2020-07-10 NOTE — TELEPHONE ENCOUNTER
Controlled Substance Refill Request for VYVANSE 60 MG capsule   Problem List Complete:  No     PROVIDER TO CONSIDER COMPLETION OF PROBLEM LIST AND OVERVIEW/CONTROLLED SUBSTANCE AGREEMENT    Last Written Prescription Date:  6-8-2020  Last Fill Quantity: 30,   # refills: 0    Last Office Visit with [facility] primary care provider: 3-6-2020    Future Office visit:     Controlled substance agreement:   Encounter-Level CSA:    There are no encounter-level csa.     Patient-Level CSA:    Controlled Substance Agreement - Opioid - Scan on 3/6/2020  4:38 PM         Last Urine Drug Screen: No results found for: CDAUT, No results found for: COMDAT, No results found for: THC13, PCP13, COC13, MAMP13, OPI13, AMP13, BZO13, TCA13, MTD13, BAR13, OXY13, PPX13, BUP13     Processing:  Rx to be electronically transmitted to pharmacy by provider      [email or web address]     checked in past 3 months?  Yes 6-8-2020

## 2020-09-27 DIAGNOSIS — F33.1 MAJOR DEPRESSIVE DISORDER, RECURRENT EPISODE, MODERATE (H): Chronic | ICD-10-CM

## 2020-09-27 NOTE — LETTER
[address]  [place] [address] [phone]          September 28, 2020    Klebo J Skjervold David                                                                                                         [address] [facility] [address]        Dear Mendy,    We are sending you this letter to let you know that we have refilled your prescription for sertraline (ZOLOFT) 50 MG tablet.  This was filled for a 30 day supply.    You will need to be seen for an appointment before we are able to refill this again.      Please call the clinic at [phone], to schedule an appointment at your earliest convenience so that we can continue to refill your medications.  Feel free to contact us with any further questions or concerns.  Thank you.    Sincerely,         Enrike Neal MD/shawn

## 2020-10-23 DIAGNOSIS — F33.1 MAJOR DEPRESSIVE DISORDER, RECURRENT EPISODE, MODERATE (H): Chronic | ICD-10-CM

## 2020-10-23 NOTE — TELEPHONE ENCOUNTER
Routing refill request to provider for review/approval because:  Tita given x1 and patient did not follow up, please advise  Yanet Ho BSN, RN

## 2020-10-23 NOTE — TELEPHONE ENCOUNTER
Encourage a follow up appointment, office, video, or telephone visit     We will likely deny next refill requests if no follow ups    Enrike Neal MD

## 2020-11-02 ENCOUNTER — VIRTUAL VISIT (OUTPATIENT)
Dept: INTERNAL MEDICINE | Facility: CLINIC | Age: 20
End: 2020-11-02
Payer: COMMERCIAL

## 2020-11-02 DIAGNOSIS — F90.9 ATTENTION DEFICIT HYPERACTIVITY DISORDER (ADHD), UNSPECIFIED ADHD TYPE: ICD-10-CM

## 2020-11-02 DIAGNOSIS — F33.1 MAJOR DEPRESSIVE DISORDER, RECURRENT EPISODE, MODERATE (H): Chronic | ICD-10-CM

## 2020-11-02 PROCEDURE — 99214 OFFICE O/P EST MOD 30 MIN: CPT | Mod: TEL | Performed by: INTERNAL MEDICINE

## 2020-11-02 RX ORDER — LISDEXAMFETAMINE DIMESYLATE 60 MG/1
CAPSULE ORAL
Qty: 30 CAPSULE | Refills: 0 | Status: SHIPPED | OUTPATIENT
Start: 2020-11-02 | End: 2021-07-15

## 2020-11-02 ASSESSMENT — PATIENT HEALTH QUESTIONNAIRE - PHQ9: SUM OF ALL RESPONSES TO PHQ QUESTIONS 1-9: 7

## 2020-11-02 NOTE — PROGRESS NOTES
"Klebo J Skjervold David is a 20 year old male who is being evaluated via a billable telephone visit.      The patient has been notified of following:     \"This telephone visit will be conducted via a call between you and your physician/provider. We have found that certain health care needs can be provided without the need for a physical exam.  This service lets us provide the care you need with a short phone conversation.  If a prescription is necessary we can send it directly to your pharmacy.  If lab work is needed we can place an order for that and you can then stop by our lab to have the test done at a later time.    Telephone visits are billed at different rates depending on your insurance coverage. During this emergency period, for some insurers they may be billed the same as an in-person visit.  Please reach out to your insurance provider with any questions.    If during the course of the call the physician/provider feels a telephone visit is not appropriate, you will not be charged for this service.\"    Patient has given verbal consent for Telephone visit?  Yes    What phone number would you like to be contacted at? [phone]    How would you like to obtain your AVS? Vidalhart    Subjective     Klebo J Skjervold David is a 20 year old male who presents via phone visit today for the following health issues:    HPI      Chief Complaint   Patient presents with     Recheck Medication     I successfully connected with patient today via a telephone MD visit. He's not concerned at all and feels his treatment is satisfactory . He had no major questions at all. We cleaned up his medication list and removed several medications that were inaccurate and old and not being taken.      Review of Systems   Constitutional, HEENT, cardiovascular, pulmonary, gi and gu systems are negative, except as otherwise noted.       Objective          Vitals:  No vitals were obtained today due to virtual visit.    healthy, alert and no " distress  PSYCH: Alert and oriented times 3; coherent speech, normal   rate and volume, able to articulate logical thoughts, able   to abstract reason, no tangential thoughts, no hallucinations   or delusions  His affect is normal  RESP: No cough, no audible wheezing, able to talk in full sentences  Remainder of exam unable to be completed due to telephone visits          Assessment/Plan:    Assessment & Plan     Attention deficit hyperactivity disorder (ADHD), unspecified ADHD type  Stable phase of chronic illness , ongoing monitoring    Recheck face to face encounter 6 months   - VYVANSE 60 MG capsule; TAKE 1 CAPSULE BY MOUTH EVERY MORNING. MUST BE 28 DAYS BETWEEN PRESCRIPTIONS -    Major depressive disorder, recurrent episode, moderate (H)  As detailed above ,  Problem is stable and ongoing monitoring    - sertraline (ZOLOFT) 50 MG tablet; Take 1 tablet (50 mg) by mouth daily     Tobacco Cessation:   reports that he has been smoking cigarettes. He has been smoking about 1.00 pack per day. He has never used smokeless tobacco.  Tobacco Cessation Action Plan: not addressed today        Return in about 6 months (around 5/2/2021) for Physical Exam.    Enrike Neal MD  [facility]    Phone call duration:  5 minutes    Telephone MD visit     Call began at 1:34 PM   Call ended at 1:39 PM

## 2020-11-16 ENCOUNTER — HEALTH MAINTENANCE LETTER (OUTPATIENT)
Age: 20
End: 2020-11-16

## 2020-12-04 ENCOUNTER — VIRTUAL VISIT (OUTPATIENT)
Dept: FAMILY MEDICINE | Facility: OTHER | Age: 20
End: 2020-12-04
Payer: COMMERCIAL

## 2020-12-04 PROCEDURE — 99421 OL DIG E/M SVC 5-10 MIN: CPT | Performed by: PHYSICIAN ASSISTANT

## 2020-12-04 NOTE — PROGRESS NOTES
"Date: 2020 14:36:53  Clinician: Josefina Tillman  Clinician NPI: [id number]  Patient: Klebo Skjervold-David  Patient : 2000  Patient Address: [address] [place], [place] [address] [address]  Patient Phone: [phone]  Visit Protocol: URI  Patient Summary:  Mendy is a 20 year old ( : 2000 ) male who initiated a [facility] Visit for COVID-19 (Coronavirus) evaluation and screening. When asked the question \"Please sign me up to receive news, health information and promotions from [facility].\", Mendy responded \"No\".    Mendy states his symptoms started gradually 3-4 days ago.   His symptoms consist of a headache, a cough, nasal congestion, myalgia, chills, and malaise.   Symptom details     Nasal secretions: The color of his mucus is green, yellow, and white.    Cough: Mendy coughs a few times an hour and his cough is not more bothersome at night. Phlegm comes into his throat when he coughs. He does not believe his cough is caused by post-nasal drip. The color of the phlegm is yellow.     Headache: He states the headache is mild (1-3 on a 10 point pain scale).      Mendy denies having ear pain, wheezing, fever, nausea, vomiting, rhinitis, facial pain or pressure, sore throat, teeth pain, ageusia, diarrhea, and anosmia. He also denies taking antibiotic medication in the past month, having recent facial or sinus surgery in the past 60 days, double sickening (worsening symptoms after initial improvement), and having a sinus infection within the past year. He is not experiencing dyspnea.   Precipitating events  He has not recently been exposed to someone with influenza. Mendy has been in close contact with the following high risk individuals: people with asthma, heart disease or diabetes and adults 65 or older.   Pertinent COVID-19 (Coronavirus) information  Mendy does not work or volunteer as healthcare worker or a . In the past 14 days, Mendy has not worked or volunteered at a healthcare facility or " group living setting.   In the past 14 days, he also has not lived in a congregate living setting.   Mendy has not had a close contact with a laboratory-confirmed COVID-19 patient within 14 days of symptom onset.    Since December 2019, Mendy has not been tested for COVID-19 and has not had upper respiratory infection or influenza-like illness.   Pertinent medical history  He has not been told by his provider to avoid NSAIDs.   Mendy does not have diabetes. He denies having immunosuppressive conditions (e.g., chemotherapy, HIV, organ transplant, long-term use of steroids or other immunosuppressive medications, splenectomy). He does not have severe COPD and congestive heart failure. He does not have asthma.   Mendy does not need a return to work/school note.   Weight: 235 lbs   Mendy smokes or uses smokeless tobacco.   Weight: 235 lbs    MEDICATIONS: sertraline oral, cetirizine oral, Vyvanse oral, ALLERGIES: Sulfa (Sulfonamide Antibiotics), Augmentin  Clinician Response:  Dear Mendy,   Your symptoms show that you may have coronavirus (COVID-19). This illness can cause fever, cough and trouble breathing. Many people get a mild case and get better on their own. Some people can get very sick.  What should I do?  We would like to test you for this virus.   1. Please call [phone] to schedule your visit. Explain that you were referred by [facility] to have a COVID-19 test. Be ready to share your [facility] visit ID number.  * If you need to schedule in [facility] please call [phone] or for [employer] employees please call [phone].  * If you need to schedule in the [place] area please call [phone]. [employer] employees call [phone].  The following will serve as your written order for this COVID Test, ordered by me, for the indication of suspected COVID [Z20.828]: The test will be ordered in [employer], our electronic health record, after you are scheduled. It will show as ordered and authorized by Lane Chester MD.   "Order: COVID-19 (Coronavirus) PCR for SYMPTOMATIC testing from [facility].   2. When it's time for your COVID test:  Stay at least 6 feet away from others. (If someone will drive you to your test, stay in the backseat, as far away from the  as you can.)   Cover your mouth and nose with a mask, tissue or washcloth.  Go straight to the testing site. Don't make any stops on the way there or back.      3.Starting now: Stay home and away from others (self-isolate) until:   You've had no fever---and no medicine that reduces fever---for one full day (24 hours). And...   Your other symptoms have gotten better. For example, your cough or breathing has improved. And...   At least 10 days have passed since your symptoms started.       During this time, don't leave the house except for testing or medical care.   Stay in your own room, even for meals. Use your own bathroom if you can.   Stay away from others in your home. No hugging, kissing or shaking hands. No visitors.  Don't go to work, school or anywhere else.    Clean \"high touch\" surfaces often (doorknobs, counters, handles, etc.). Use a household cleaning spray or wipes. You'll find a full list of  on the EPA website: www.epa.gov/pesticide-registration/list-n-disinfectants-use-against-sars-cov-2.   Cover your mouth and nose with a mask, tissue or washcloth to avoid spreading germs.  Wash your hands and face often. Use soap and water.  Caregivers in these groups are at risk for severe illness due to COVID-19:  o People 65 years and older  o People who live in a nursing home or long-term care facility  o People with chronic disease (lung, heart, cancer, diabetes, kidney, liver, immunologic)  o People who have a weakened immune system, including those who:   Are in cancer treatment  Take medicine that weakens the immune system, such as corticosteroids  Had a bone marrow or organ transplant  Have an immune deficiency  Have poorly controlled HIV or AIDS  Are obese " (body mass index of 40 or higher)  Smoke regularly   o Caregivers should wear gloves while washing dishes, handling laundry and cleaning bedrooms and bathrooms.  o Use caution when washing and drying laundry: Don't shake dirty laundry, and use the warmest water setting that you can.  o For more tips, go to www.cdc.gov/coronavirus/2019-ncov/downloads/10Things.pdf.    4.Sign up for [employer]. We know it's scary to hear that you might have COVID-19. We want to track your symptoms to make sure you're okay over the next 2 weeks. Please look for an email from SPARQ---this is a free, online program that we'll use to keep in touch. To sign up, follow the link in the email. Learn more at [email or web address]  How can I take care of myself?   Get lots of rest. Drink extra fluids (unless a doctor has told you not to).   Take Tylenol (acetaminophen) for fever or pain. If you have liver or kidney problems, ask your family doctor if it's okay to take Tylenol.   Adults can take either:    650 mg (two 325 mg pills) every 4 to 6 hours, or...   1,000 mg (two 500 mg pills) every 8 hours as needed.    Note: Don't take more than 3,000 mg in one day. Acetaminophen is found in many medicines (both prescribed and over-the-counter medicines). Read all labels to be sure you don't take too much.   For children, check the Tylenol bottle for the right dose. The dose is based on the child's age or weight.    If you have other health problems (like cancer, heart failure, an organ transplant or severe kidney disease): Call your specialty clinic if you don't feel better in the next 2 days.       Know when to call 911. Emergency warning signs include:    Trouble breathing or shortness of breath Pain or pressure in the chest that doesn't go away Feeling confused like you haven't felt before, or not being able to wake up Bluish-colored lips or face.  Where can I get more information?   [facility] -- About COVID-19:  [email or web address]   CDC -- What to Do If You're Sick: www.cdc.gov/coronavirus/2019-ncov/about/steps-when-sick.html   CDC -- Ending Home Isolation: www.cdc.gov/coronavirus/2019-ncov/hcp/disposition-in-home-patients.html   CDC -- Caring for Someone: www.cdc.gov/coronavirus/2019-ncov/if-you-are-sick/care-for-someone.html   [facility] -- Interim Guidance for Hospital Discharge to Home: www.[facility].[place]./diseases/coronavirus/hcp/hospdischarge.pdf   [facility] clinical trials (COVID-19 research studies): clinicalaffairs.[facility]-clinical-trials    Below are the COVID-19 hotlines at the [place] Department of Health ([facility]). Interpreters are available.    For health questions: Call 062-722-6758 or 1-484.106.1486 (7 a.m. to 7 p.m.) For questions about schools and childcare: Call 733-598-2016 or 1-700.100.5576 (7 a.m. to 7 p.m.)    Diagnosis: Contact with and (suspected) exposure to other viral communicable diseases  Diagnosis ICD: Z20.828

## 2020-12-05 DIAGNOSIS — Z20.822 SUSPECTED COVID-19 VIRUS INFECTION: ICD-10-CM

## 2020-12-05 DIAGNOSIS — Z20.822 SUSPECTED COVID-19 VIRUS INFECTION: Primary | ICD-10-CM

## 2020-12-05 DIAGNOSIS — Z20.822 SUSPECTED 2019 NOVEL CORONAVIRUS INFECTION: Primary | ICD-10-CM

## 2020-12-05 PROCEDURE — 99207 PR NO CHARGE LOS: CPT

## 2020-12-05 PROCEDURE — U0003 INFECTIOUS AGENT DETECTION BY NUCLEIC ACID (DNA OR RNA); SEVERE ACUTE RESPIRATORY SYNDROME CORONAVIRUS 2 (SARS-COV-2) (CORONAVIRUS DISEASE [COVID-19]), AMPLIFIED PROBE TECHNIQUE, MAKING USE OF HIGH THROUGHPUT TECHNOLOGIES AS DESCRIBED BY CMS-2020-01-R: HCPCS | Performed by: FAMILY MEDICINE

## 2020-12-06 LAB
SARS-COV-2 RNA SPEC QL NAA+PROBE: NOT DETECTED
SPECIMEN SOURCE: NORMAL

## 2020-12-10 ENCOUNTER — OFFICE VISIT (OUTPATIENT)
Dept: CARDIOLOGY | Facility: CLINIC | Age: 20
End: 2020-12-10
Payer: COMMERCIAL

## 2020-12-10 VITALS
HEIGHT: 68 IN | SYSTOLIC BLOOD PRESSURE: 144 MMHG | HEART RATE: 85 BPM | DIASTOLIC BLOOD PRESSURE: 83 MMHG | BODY MASS INDEX: 36.65 KG/M2 | RESPIRATION RATE: 8 BRPM | WEIGHT: 241.84 LBS | OXYGEN SATURATION: 99 %

## 2020-12-10 DIAGNOSIS — I44.0 FIRST DEGREE HEART BLOCK: Primary | ICD-10-CM

## 2020-12-10 PROCEDURE — 99212 OFFICE O/P EST SF 10 MIN: CPT | Mod: 25 | Performed by: PEDIATRICS

## 2020-12-10 RX ORDER — PHENOL 1.4 %
10 AEROSOL, SPRAY (ML) MUCOUS MEMBRANE
COMMUNITY

## 2020-12-10 ASSESSMENT — MIFFLIN-ST. JEOR: SCORE: 2070.75

## 2020-12-10 NOTE — PATIENT INSTRUCTIONS
Thank you for choosing [facility]. It was a pleasure to see you for your office visit today.     If you have any questions or scheduling needs during regular office hours, please call our [place] clinic: [phone]   If urgent concerns arise after hours, you can call [phone] and ask to speak to the pediatric specialist on call.   If you need to schedule Radiology tests, please call: [phone]  My Chart messages are for routine communication and questions and are usually answered within 48-72 hours. If you have an urgent concern or require sooner response, please call us.  Outside lab and imaging results should be faxed to [phone].  If you go to a lab outside of [facility] we will not automatically get those results. You will need to ask to have them faxed.       If you had any blood work, imaging or other tests completed today:  Normal test results will be mailed to your home address in a letter.  Abnormal results will be communicated to you via phone call/letter.  Please allow up to 1-2 weeks for processing and interpretation of most lab work.

## 2020-12-10 NOTE — PROGRESS NOTES
"                                               PEDS Cardiac Consult Letter  Date: 12/10/2020      Enrike Neal MD  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  :          2000   ELIANE:          12/10/2020    Dear Dr. Neal:    Mendy is 20 years old and was seen at the [facility] on 12/10/2020.   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  He remains asymptomatic with no palpitations or syncope.    On physical examination his height was 1.726 m (5' 7.95\") and his weight was 109.7 kg (241 lb 13.5 oz).  His heart rate was 85  and respirations 8 per minute.  The blood pressure in his right arm was 144/83.  He was acyanotic, warm and well perfused. He was alert cooperative and in no distress.  His lungs were clear to auscultation without respiratory distress.  He had a regular rhythm with no murmur.  The second heart sound was physiologically split with a normal pulmonary component.   There was no organomegaly or abdominal tenderness.  Peripheral pulses were 2+ and equal in all extremities.  There was no clubbing or edema.    An electrocardiogram performed today that I personally reviewed and explained to him and his father showed first-degree heart block and was otherwise normal.    Mendy has first-degree heart block that has remained stable with no evidence of progression.  4 years ago an ECG monitor did not show any arrhythmias.  He does not need any activity restriction.  His COVID-19 risk is not increased because of his heart, although he is significantly overweight which does increase his risk.  I would like to see him in follow-up in 1 year with an electrocardiogram, and may repeat ECG monitor at that time.    Thank you very much for your confidence in allowing me to participate " in Klebo's care.  If you have any questions or concerns, please don't hesitate to contact me.    Sincerely,      Aaron Pagan M.D.   Pediatric Cardiology   [facility]  [phone]    Note: Chart documentation done in part with Dragon Voice Recognition software. Although reviewed after completion, some word and grammatical errors may remain.

## 2020-12-10 NOTE — PROGRESS NOTES
"Klebo J Skjervold David's goals for this visit include: Consult heart murmur  He requests these members of his care team be copied on today's visit information: yes    PCP: Enrike Neal    Referring Provider:  Enrike Neal MD  [address]  [place] [address]    BP (!) 144/83 (BP Location: Right arm)   Pulse 85   Resp 8   Ht 1.726 m (5' 7.95\")   Wt 109.7 kg (241 lb 13.5 oz)   SpO2 99%   BMI 36.82 kg/m          "

## 2020-12-11 ENCOUNTER — TELEPHONE (OUTPATIENT)
Dept: CARDIOLOGY | Facility: CLINIC | Age: 20
End: 2020-12-11

## 2020-12-11 NOTE — TELEPHONE ENCOUNTER
12/11 Provided phone number [phone] to schedule echo.      Zayda Trammell   Procedure    Ortho/Sports Med/Pod/Ent/Eye  [facility]   [phone]

## 2021-02-12 ENCOUNTER — TRANSFERRED RECORDS (OUTPATIENT)
Dept: HEALTH INFORMATION MANAGEMENT | Facility: CLINIC | Age: 21
End: 2021-02-12

## 2021-05-13 DIAGNOSIS — F33.1 MAJOR DEPRESSIVE DISORDER, RECURRENT EPISODE, MODERATE (H): Chronic | ICD-10-CM

## 2021-05-17 NOTE — TELEPHONE ENCOUNTER
Please call to schedule-  Return in about 6 months (around 5/2/2021) for Physical Exam.     Enrike Neal MD

## 2021-05-17 NOTE — TELEPHONE ENCOUNTER
Called patient and left VM to call clinic. Please help schedule annual exam appointment if patient returns call.  Sandee LANDRY CMA ([facility])

## 2021-05-19 NOTE — TELEPHONE ENCOUNTER
Called Mendy and scheduled annnual exam on 6/18/21 at 2:50pm. Please send rylee period refill.  Krissy Marquez-  Kenn

## 2021-07-15 ENCOUNTER — OFFICE VISIT (OUTPATIENT)
Dept: INTERNAL MEDICINE | Facility: CLINIC | Age: 21
End: 2021-07-15
Payer: COMMERCIAL

## 2021-07-15 VITALS
OXYGEN SATURATION: 100 % | TEMPERATURE: 98 F | SYSTOLIC BLOOD PRESSURE: 126 MMHG | DIASTOLIC BLOOD PRESSURE: 72 MMHG | BODY MASS INDEX: 38.3 KG/M2 | WEIGHT: 244 LBS | HEIGHT: 67 IN | HEART RATE: 75 BPM | RESPIRATION RATE: 14 BRPM

## 2021-07-15 DIAGNOSIS — Z13.6 CARDIOVASCULAR SCREENING; LDL GOAL LESS THAN 160: ICD-10-CM

## 2021-07-15 DIAGNOSIS — F17.200 NICOTINE DEPENDENCE, UNCOMPLICATED, UNSPECIFIED NICOTINE PRODUCT TYPE: ICD-10-CM

## 2021-07-15 DIAGNOSIS — Z00.00 ROUTINE HISTORY AND PHYSICAL EXAMINATION OF ADULT: Primary | ICD-10-CM

## 2021-07-15 DIAGNOSIS — E55.9 HYPOVITAMINOSIS D: ICD-10-CM

## 2021-07-15 DIAGNOSIS — Q89.8 STICKLER SYNDROME: ICD-10-CM

## 2021-07-15 DIAGNOSIS — F90.9 ATTENTION DEFICIT HYPERACTIVITY DISORDER (ADHD), UNSPECIFIED ADHD TYPE: ICD-10-CM

## 2021-07-15 DIAGNOSIS — Z11.59 ENCOUNTER FOR HEPATITIS C SCREENING TEST FOR LOW RISK PATIENT: ICD-10-CM

## 2021-07-15 LAB
ANION GAP SERPL CALCULATED.3IONS-SCNC: 6 MMOL/L (ref 3–14)
BUN SERPL-MCNC: 11 MG/DL (ref 7–30)
CALCIUM SERPL-MCNC: 9.9 MG/DL (ref 8.5–10.1)
CHLORIDE BLD-SCNC: 107 MMOL/L (ref 94–109)
CHOLEST SERPL-MCNC: 212 MG/DL
CO2 SERPL-SCNC: 25 MMOL/L (ref 20–32)
CREAT SERPL-MCNC: 0.75 MG/DL (ref 0.66–1.25)
FASTING STATUS PATIENT QL REPORTED: YES
GFR SERPL CREATININE-BSD FRML MDRD: >90 ML/MIN/1.73M2
GLUCOSE BLD-MCNC: 98 MG/DL (ref 70–99)
HDLC SERPL-MCNC: 32 MG/DL
HGB BLD-MCNC: 16.5 G/DL (ref 13.3–17.7)
LDLC SERPL CALC-MCNC: 141 MG/DL
NONHDLC SERPL-MCNC: 180 MG/DL
POTASSIUM BLD-SCNC: 4.2 MMOL/L (ref 3.4–5.3)
SODIUM SERPL-SCNC: 138 MMOL/L (ref 133–144)
TRIGL SERPL-MCNC: 195 MG/DL

## 2021-07-15 PROCEDURE — 85018 HEMOGLOBIN: CPT | Performed by: INTERNAL MEDICINE

## 2021-07-15 PROCEDURE — 80061 LIPID PANEL: CPT | Performed by: INTERNAL MEDICINE

## 2021-07-15 PROCEDURE — 90471 IMMUNIZATION ADMIN: CPT | Performed by: INTERNAL MEDICINE

## 2021-07-15 PROCEDURE — 82306 VITAMIN D 25 HYDROXY: CPT | Performed by: INTERNAL MEDICINE

## 2021-07-15 PROCEDURE — 80048 BASIC METABOLIC PNL TOTAL CA: CPT | Performed by: INTERNAL MEDICINE

## 2021-07-15 PROCEDURE — 36415 COLL VENOUS BLD VENIPUNCTURE: CPT | Performed by: INTERNAL MEDICINE

## 2021-07-15 PROCEDURE — 99395 PREV VISIT EST AGE 18-39: CPT | Mod: 25 | Performed by: INTERNAL MEDICINE

## 2021-07-15 PROCEDURE — 90715 TDAP VACCINE 7 YRS/> IM: CPT | Performed by: INTERNAL MEDICINE

## 2021-07-15 PROCEDURE — 86803 HEPATITIS C AB TEST: CPT | Performed by: INTERNAL MEDICINE

## 2021-07-15 RX ORDER — LISDEXAMFETAMINE DIMESYLATE 60 MG/1
CAPSULE ORAL
Qty: 30 CAPSULE | Refills: 0 | Status: SHIPPED | OUTPATIENT
Start: 2021-07-15

## 2021-07-15 ASSESSMENT — ENCOUNTER SYMPTOMS
CONSTIPATION: 0
NERVOUS/ANXIOUS: 0
SHORTNESS OF BREATH: 0
DIARRHEA: 0
HEARTBURN: 0
DYSURIA: 0
COUGH: 0
FEVER: 0
JOINT SWELLING: 0
ABDOMINAL PAIN: 0
WEAKNESS: 0
PARESTHESIAS: 0
NAUSEA: 0
CHILLS: 0
MYALGIAS: 0
FREQUENCY: 0
ARTHRALGIAS: 0
PALPITATIONS: 0
HEMATOCHEZIA: 0
EYE PAIN: 0
DIZZINESS: 0
SORE THROAT: 0
HEMATURIA: 0
HEADACHES: 0

## 2021-07-15 ASSESSMENT — MIFFLIN-ST. JEOR: SCORE: 2060.41

## 2021-07-15 ASSESSMENT — PATIENT HEALTH QUESTIONNAIRE - PHQ9: SUM OF ALL RESPONSES TO PHQ QUESTIONS 1-9: 7

## 2021-07-15 NOTE — LETTER
July 16, 2021    Klebo J Skjervold David  [address] [address]          Dear Mr.Skjervold David,    We are writing to inform you of your test results.    All of these tests are within acceptable limits , things look good ! The Vitamin D level is at the lowest possible level to still be within normal limits. I am asking / recommending you start taking a vitamin D supplement. Over the counter nonprescription 2000 international units at least 3 times per week . This vitamin D should probably be rechecked in 6-12 months . Please let me know if you have any questions for me about all of these lab tests .        Resulted Orders   Hepatitis C antibody   Result Value Ref Range    Hepatitis C Antibody Nonreactive Nonreactive    Narrative    Assay performance characteristics have not been established for newborns, infants, and children.   Vitamin D Deficiency   Result Value Ref Range    Vitamin D, Total (25-Hydroxy) 21 20 - 75 ug/L    Narrative    Season, race, dietary intake, and treatment affect the concentration of 25-hydroxy-Vitamin D. Values may decrease during winter months and increase during summer months. Values 20-29 ug/L may indicate Vitamin D insufficiency and values <20 ug/L may indicate Vitamin D deficiency.    Vitamin D determination is routinely performed by an immunoassay specific for 25 hydroxyvitamin D3.  If an individual is on vitamin D2(ergocalciferol) supplementation, please specify 25 OH vitamin D2 and D3 level determination by LCMSMS test VITD23.     Hemoglobin   Result Value Ref Range    Hemoglobin 16.5 13.3 - 17.7 g/dL   Basic metabolic panel  (Ca, Cl, CO2, Creat, Gluc, K, Na, BUN)   Result Value Ref Range    Sodium 138 133 - 144 mmol/L    Potassium 4.2 3.4 - 5.3 mmol/L    Chloride 107 94 - 109 mmol/L    Carbon Dioxide (CO2) 25 20 - 32 mmol/L    Anion Gap 6 3 - 14 mmol/L    Urea Nitrogen 11 7 - 30 mg/dL    Creatinine 0.75 0.66 - 1.25 mg/dL    Calcium 9.9 8.5 - 10.1 mg/dL     Glucose 98 70 - 99 mg/dL    GFR Estimate >90 >60 mL/min/1.73m2      Comment:      As of July 11, 2021, eGFR is calculated by the CKD-EPI creatinine equation, without race adjustment. eGFR can be influenced by muscle mass, exercise, and diet. The reported eGFR is an estimation only and is only applicable if the renal function is stable.   Lipid panel reflex to direct LDL Fasting   Result Value Ref Range    Cholesterol 212 (H) <200 mg/dL      Comment:      Age 0-19 years  Desirable: <170 mg/dL  Borderline high:  170-199 mg/dl  High:            >199 mg/dl    Age 20 years and older  Desirable: <200 mg/dL    Triglycerides 195 (H) <150 mg/dL      Comment:      0-9 years:  Normal:    Less than 75 mg/dL  Borderline high:  75-99 mg/dL  High:             Greater than or equal to 100 mg/dL    0-19 years:  Normal:    Less than 90 mg/dL  Borderline high:   mg/dL  High:             Greater than or equal to 130 mg/dL    20 years and older:  Normal:    Less than 150 mg/dL  Borderline high:  150-199 mg/dL  High:             200-499 mg/dL  Very high:   Greater than or equal to 500 mg/dL    Direct Measure HDL 32 (L) >=40 mg/dL      Comment:      0-19 years:       Greater than or equal to 45 mg/dL   Low: Less than 40 mg/dL   Borderline low: 40-44 mg/dL     20 years and older:   Female: Greater than or equal to 50 mg/dL   Male:   Greater than or equal to 40 mg/dL         LDL Cholesterol Calculated 141 (H) <=100 mg/dL      Comment:      Age 0-19 years:  Desirable: 0-110 mg/dL   Borderline high: 110-129 mg/dL   High: >= 130 mg/dL    Age 20 years and older:  Desirable: <100mg/dL  Above desirable: 100-129 mg/dL   Borderline high: 130-159 mg/dL   High: 160-189 mg/dL   Very high: >= 190 mg/dL    Non HDL Cholesterol 180 (H) <130 mg/dL      Comment:      0-19 years:  Desirable:          Less than 120 mg/dL  Borderline high:   120-144 mg/dL  High:                   Greater than or equal to 145 mg/dL    20 years and older:  Desirable:           130 mg/dL  Above Desirable: 130-159 mg/dL  Borderline high:   160-189 mg/dL  High:               190-219 mg/dL  Very high:     Greater than or equal to 220 mg/dL    Patient Fasting > 8hrs? Yes        If you have any questions or concerns, please call the clinic at the number listed above.       Sincerely,      Enrike Neal MD

## 2021-07-15 NOTE — PATIENT INSTRUCTIONS
HOW TO QUIT SMOKING  Smoking is one of the hardest habits to break. About half of all those who have ever smoked have been able to quit, and most of those (about 70%) who still smoke want to quit. Here are some of the best ways to stop smoking.     KEEP TRYING:  It takes most smokers about 8 tries before they are finally able to fully quit. So, the more often you try and fail, the better your chance of quitting the next time! So, don't give up!    GO COLD TURKEY:  Most ex-smokers quit cold turkey. Trying to cut back gradually doesn't seem to work as well, perhaps because it continues the smoking habit. Also, it is possible to fool yourself by inhaling more while smoking fewer cigarettes. This results in the same amount of nicotine in your body!    GET SUPPORT:  Support programs can make an important difference, especially for the heavy smoker. These groups offer lectures, methods to change your behavior and peer support. Call the free national Quitline for more information. 800-QUIT-NOW (983-393-3573). Low-cost or free programs are offered by many hospitals, local chapters of the American Lung Association (165-231-2732) and the American Cancer Society (849-214-0631). Support at home is important too. Non-smokers can help by offering praise and encouragement. If the smoker fails to quit, encourage them to try again!    OVER-THE-COUNTER MEDICINES:  For those who can't quit on their own, Nicotine Replacement Therapy (NRT) may make quitting much easier. Certain aids such as the nicotine patch, gum and lozenge are available without a prescription. However, it is best to use these under the guidance of your doctor. The skin patch provides a steady supply of nicotine to the body. Nicotine gum and lozenge gives temporary bursts of low levels of nicotine. Both methods take the edge off the craving for cigarettes. WARNING: If you feel symptoms of nicotine overdose, such as nausea, vomiting, dizziness, weakness, or fast  heartbeat, stop using these and see your doctor.    PRESCRIPTION MEDICINES:  After evaluating your smoking patterns and prior attempts at quitting, your doctor may offer a prescription medicine such as bupropion (Zyban, Wellbutrin), varenicline (Chantix, Champix), a niocotine inhaler or nasal spray. Each has its unique advantage and side effects which your doctor can review with you.    HEALTH BENEFITS OF QUITTING:  The benefits of quitting start right away and keep improving the longer you go without smokin minutes: blood pressure and pulse return to normal  8 hours: oxygen levels return to normal  2 days: ability to smell and taste begins to improve as damaged nerves start to regrow  2-3 weeks: circulation and lung function improves  1-9 months: decreased cough, congestion and shortness of breath; less tired  1 year: risk of heart attack decreases by half  5 years: risk of lung cancer decreases by half; risk of stroke becomes the same as a non-smoker  For information about how to quit smoking, visit the following links:  [employer] [place] ,   Clearing the Air, Quit Smoking Today   - an online booklet. http://www.smokefree.gov/pubs/clearing_the_air.pdf  Smokefree.gov http://smokefree.gov/  QuitNet http://www.quitnet.com/    1510-0750 [employer], [address] [place] [address], [place] [address]. All rights reserved. This information is not intended as a substitute for professional medical care. Always follow your healthcare professional's instructions.

## 2021-07-15 NOTE — PROGRESS NOTES
SUBJECTIVE:   CC: Klebo J Skjervold David is an 21 year old male who presents for preventative health visit.       Patient has been advised of split billing requirements and indicates understanding: Yes  Healthy Habits:     Getting at least 3 servings of Calcium per day:  Yes    Bi-annual eye exam:  Yes    Dental care twice a year:  Yes    Sleep apnea or symptoms of sleep apnea:  None    Diet:  Regular (no restrictions)    Frequency of exercise:  4-5 days/week    Duration of exercise:  15-30 minutes    Taking medications regularly:  Yes    Medication side effects:  None    PHQ-2 Total Score: 2    Additional concerns today:  No      Today's PHQ-2 Score:   PHQ-2 ( 1999 [employer]) 7/15/2021   Q1: Little interest or pleasure in doing things 1   Q2: Feeling down, depressed or hopeless 1   PHQ-2 Score 2   Q1: Little interest or pleasure in doing things Several days   Q2: Feeling down, depressed or hopeless Several days   PHQ-2 Score 2       Abuse: Current or Past(Physical, Sexual or Emotional)- No  Do you feel safe in your environment? Yes    Have you ever done Advance Care Planning? (For example, a Health Directive, POLST, or a discussion with a medical provider or your loved ones about your wishes): No, advance care planning information given to patient to review.  Advanced care planning was discussed at today's visit.    Social History     Tobacco Use     Smoking status: Current Every Day Smoker     Packs/day: 1.00     Types: Cigarettes     Smokeless tobacco: Never Used   Substance Use Topics     Alcohol use: No       Alcohol Use 7/15/2021   Prescreen: >3 drinks/day or >7 drinks/week? No   Prescreen: >3 drinks/day or >7 drinks/week? -     Last PSA: No results found for: PSA    Reviewed orders with patient. Reviewed health maintenance and updated orders accordingly - Yes  Lab work is in process  Labs reviewed in EPIC  BP Readings from Last 3 Encounters:   07/15/21 126/72   12/10/20 (!) 144/83   03/06/20 136/76    Wt  Readings from Last 3 Encounters:   07/15/21 110.7 kg (244 lb)   12/10/20 109.7 kg (241 lb 13.5 oz)   03/06/20 106.6 kg (235 lb)                  Patient Active Problem List   Diagnosis     ADHD: inattentive subtype     Innocent heart murmur     Abnormal electrocardiogram     Stickler syndrome     Cataract, mild, ou     Retinal degeneration     HL (hearing loss)     Moderate major depression (H)     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, ou (PQ)     Sleep disorder, circadian, delayed sleep phase type     PETRA (generalized anxiety disorder)     Past Surgical History:   Procedure Laterality Date     CIRCUMCISION       EYE SURGERY  2008    Laser retinas both eyes     frenulectomy       GENITOURINARY SURGERY  2001    Circumcision     H ARGON LASER FOR RETINAL TEAR      both eyes (PQ)     HERNIA REPAIR, INGUINAL RT/LT      Hernia Repair, Femoral RT/LT     PE TUBES       RETINAL REATTACHMENT       supranumery tooth extraction       TONSILLECTOMY & ADENOIDECTOMY         Social History     Tobacco Use     Smoking status: Current Every Day Smoker     Packs/day: 1.00     Types: Cigarettes     Smokeless tobacco: Never Used   Substance Use Topics     Alcohol use: No     Family History   Problem Relation Age of Onset     Allergies Mother      Obesity Mother      Thyroid Disease Mother      Diabetes Mother      Hypertension Mother      Depression/Anxiety Mother      Anesthesia Reaction Mother      Thyroid Disease Mother      Asthma Mother      Known Genetic Syndrome Mother         Sticklers     Thyroid Disease Maternal Grandmother      Hypertension Maternal Grandmother      Thyroid Disease Maternal Grandmother      Known Genetic Syndrome Maternal Grandmother         Sticklers     Alzheimer Disease Paternal Grandfather      Diabetes Paternal Grandfather      Chemical Addiction Paternal Grandfather      Diabetes Father      Cancer Maternal Grandfather      Diabetes Other      Thyroid Disease Other       Cerebrovascular Disease Other      Cancer Other      Glaucoma Other      Macular Degeneration Other      Diabetes Other      Prostate Cancer Other      Cerebrovascular Disease Other      Thyroid Disease Other      Known Genetic Syndrome Other         Sticklers     Cerebrovascular Disease Other      Known Genetic Syndrome Other         Sticklers         Current Outpatient Medications   Medication Sig Dispense Refill     cetirizine HCl 10 MG CAPS Take 1 capsule by mouth daily as needed Needs to be seen b/4 further refills for allergies 90 capsule 4     VYVANSE 60 MG capsule TAKE 1 CAPSULE BY MOUTH EVERY MORNING. MUST BE 28 DAYS BETWEEN PRESCRIPTIONS - (Patient taking differently: TAKE 1 CAPSULE BY MOUTH EVERY MORNING. MUST BE 28 DAYS BETWEEN PRESCRIPTIONS - as needed) 30 capsule 0     cholecalciferol (VITAMIN D3) 25 mcg (1000 units) capsule Take 1 capsule by mouth daily (Patient not taking: Reported on 7/15/2021)       Melatonin 10 MG TABS tablet Take 10 mg by mouth nightly as needed for sleep (Patient not taking: Reported on 7/15/2021)       sertraline (ZOLOFT) 50 MG tablet TAKE 1 TABLET BY MOUTH EVERY DAY (Patient not taking: Reported on 7/15/2021) 90 tablet 1     Allergies   Allergen Reactions     Augmentin      Sulfamethoxazole Rash     Face & Body rash     Recent Labs   Lab Test 09/17/18  1458 11/21/17  1613 02/12/15  1511   LDL  --  62  --    HDL  --  40*  --    TRIG  --  184*  --    ALT  --   --  232*   CR 0.82  --   --    GFRESTIMATED >90  --   --    GFRESTBLACK >90  --   --    POTASSIUM 4.0  --   --    TSH 1.24 1.25  --         Reviewed and updated as needed this visit by clinical staff  Tobacco  Allergies  Meds              Reviewed and updated as needed this visit by Provider                    Review of Systems   Constitutional: Negative for chills and fever.   HENT: Negative for congestion, ear pain, hearing loss and sore throat.    Eyes: Negative for pain and visual disturbance.   Respiratory:  "Negative for cough and shortness of breath.    Cardiovascular: Negative for chest pain, palpitations and peripheral edema.   Gastrointestinal: Negative for abdominal pain, constipation, diarrhea, heartburn, hematochezia and nausea.   Genitourinary: Negative for discharge, dysuria, frequency, genital sores, hematuria, impotence and urgency.   Musculoskeletal: Negative for arthralgias, joint swelling and myalgias.   Skin: Negative for rash.   Neurological: Negative for dizziness, weakness, headaches and paresthesias.   Psychiatric/Behavioral: Negative for mood changes. The patient is not nervous/anxious.      CONSTITUTIONAL: NEGATIVE for fever, chills, change in weight  INTEGUMENTARY/SKIN: NEGATIVE for worrisome rashes, moles or lesions  EYES: NEGATIVE for vision changes or irritation  ENT: NEGATIVE for ear, mouth and throat problems  RESP: NEGATIVE for significant cough or SOB  CV: NEGATIVE for chest pain, palpitations or peripheral edema  GI: NEGATIVE for nausea, abdominal pain, heartburn, or change in bowel habits   male: negative for dysuria, hematuria, decreased urinary stream, erectile dysfunction, urethral discharge  MUSCULOSKELETAL: NEGATIVE for significant arthralgias or myalgia  NEURO: NEGATIVE for weakness, dizziness or paresthesias  PSYCHIATRIC: NEGATIVE for changes in mood or affect    OBJECTIVE:   /72   Pulse 75   Temp 98  F (36.7  C) (Oral)   Resp 14   Ht 1.702 m (5' 7\")   Wt 110.7 kg (244 lb)   SpO2 100%   BMI 38.22 kg/m      Physical Exam  GENERAL: healthy, alert and no distress  EYES: Eyes grossly normal to inspection, PERRL and conjunctivae and sclerae normal  HENT: ear canals and TM's normal, nose and mouth without ulcers or lesions  NECK: no adenopathy, no asymmetry, masses, or scars and thyroid normal to palpation  RESP: lungs clear to auscultation - no rales, rhonchi or wheezes  CV: regular rate and rhythm, normal S1 S2, no S3 or S4, no murmur, click or rub, no peripheral edema " and peripheral pulses strong  ABDOMEN: soft, nontender, no hepatosplenomegaly, no masses and bowel sounds normal  MS: no gross musculoskeletal defects noted, no edema  SKIN: no suspicious lesions or rashes  NEURO: Normal strength and tone, mentation intact and speech normal  PSYCH: mentation appears normal, affect normal/bright    Diagnostic Test Results:  Labs reviewed in Epic  No orders of the defined types were placed in this encounter.        ASSESSMENT/PLAN:   (Z00.00) Routine history and physical examination of adult  (primary encounter diagnosis)  Comment: doing well. We discussed his Stickler syndrome , a genetic disease . It's why he's already wearing hearing aids at his age.  Plan: Basic metabolic panel  (Ca, Cl, CO2, Creat,         Gluc, K, Na, BUN), Hemoglobin, TDAP VACCINE         (Adacel, Boostrix)  [[id number]]            (F17.200) Nicotine dependence, uncomplicated, unspecified nicotine product type  Comment: smoking cessation encouraged   Plan: TDAP VACCINE (Adacel, Boostrix)  [[id number]]            (Q89.8) Stickler syndrome  Comment: noted as a point of historical importance   Plan: TDAP VACCINE (Adacel, Boostrix)  [[id number]]            (F90.9) Attention deficit hyperactivity disorder (ADHD), unspecified ADHD type  Comment: he continues with this mainly only while in school. I would be ok with a check-in in 6 months and a face to face encounter in one year   Plan: TDAP VACCINE (Adacel, Boostrix)  [[id number]],         VYVANSE 60 MG capsule            (Z13.6) CARDIOVASCULAR SCREENING; LDL GOAL LESS THAN 160  Comment: routine screening   Plan: Lipid panel reflex to direct LDL Fasting, TDAP         VACCINE (Adacel, Boostrix)  [[id number]]            (E55.9) Hypovitaminosis D  Comment: recheck   Plan: Vitamin D Deficiency, TDAP VACCINE (Adacel,         Boostrix)  [[id number]]            (Z11.59) Encounter for hepatitis C screening test for low risk patient  Comment: routine screening   Plan: Hepatitis C antibody,  "TDAP VACCINE (Adacel,         Boostrix)  [[id number]]        Follow up as indicated on results       Patient has been advised of split billing requirements and indicates understanding: Yes  COUNSELING:   Reviewed preventive health counseling, as reflected in patient instructions    Estimated body mass index is 38.22 kg/m  as calculated from the following:    Height as of this encounter: 1.702 m (5' 7\").    Weight as of this encounter: 110.7 kg (244 lb).     Weight management plan: Discussed healthy diet and exercise guidelines    He reports that he has been smoking cigarettes. He has been smoking about 1.00 pack per day. He has never used smokeless tobacco.  Tobacco Cessation Action Plan:   Information offered: Patient not interested at this time      Counseling Resources:  ATP IV Guidelines  Pooled Cohorts Equation Calculator  FRAX Risk Assessment  ICSI Preventive Guidelines  Dietary Guidelines for Americans, 2010  USDA's MyPlate  ASA Prophylaxis  Lung CA Screening    Enrike Neal MD  [facility]  "

## 2021-07-16 LAB
DEPRECATED CALCIDIOL+CALCIFEROL SERPL-MC: 21 UG/L (ref 20–75)
HCV AB SERPL QL IA: NONREACTIVE

## 2021-07-16 NOTE — RESULT ENCOUNTER NOTE
Klebo    All of these tests are within acceptable limits , things look good ! The Vitamin D level is at the lowest possible level to still be within normal limits. I am asking / recommending you start taking a vitamin D supplement. Over the counter nonprescription 2000 international units at least 3 times per week . This vitamin D should probably be rechecked in 6-12 months . Please let me know if you have any questions for me about all of these lab tests .    Enrike Neal MD

## 2021-09-18 ENCOUNTER — HEALTH MAINTENANCE LETTER (OUTPATIENT)
Age: 21
End: 2021-09-18

## 2021-10-12 ENCOUNTER — VIRTUAL VISIT (OUTPATIENT)
Dept: PSYCHOLOGY | Facility: CLINIC | Age: 21
End: 2021-10-12
Payer: COMMERCIAL

## 2021-10-12 DIAGNOSIS — F33.41 MAJOR DEPRESSIVE DISORDER, RECURRENT, IN PARTIAL REMISSION (H): Primary | ICD-10-CM

## 2021-10-12 PROCEDURE — 90791 PSYCH DIAGNOSTIC EVALUATION: CPT | Mod: GT | Performed by: MARRIAGE & FAMILY THERAPIST

## 2021-10-12 ASSESSMENT — COLUMBIA-SUICIDE SEVERITY RATING SCALE - C-SSRS
ATTEMPT PAST THREE MONTHS: NO
2. HAVE YOU ACTUALLY HAD ANY THOUGHTS OF KILLING YOURSELF?: NO
1. IN THE PAST MONTH, HAVE YOU WISHED YOU WERE DEAD OR WISHED YOU COULD GO TO SLEEP AND NOT WAKE UP?: NO
ATTEMPT LIFETIME: NO
TOTAL  NUMBER OF ABORTED OR SELF INTERRUPTED ATTEMPTS PAST 3 MONTHS: NO
4. HAVE YOU HAD THESE THOUGHTS AND HAD SOME INTENTION OF ACTING ON THEM?: NO
TOTAL  NUMBER OF INTERRUPTED ATTEMPTS LIFETIME: NO
5. HAVE YOU STARTED TO WORK OUT OR WORKED OUT THE DETAILS OF HOW TO KILL YOURSELF? DO YOU INTEND TO CARRY OUT THIS PLAN?: NO
6. HAVE YOU EVER DONE ANYTHING, STARTED TO DO ANYTHING, OR PREPARED TO DO ANYTHING TO END YOUR LIFE?: NO
5. HAVE YOU STARTED TO WORK OUT OR WORKED OUT THE DETAILS OF HOW TO KILL YOURSELF? DO YOU INTEND TO CARRY OUT THIS PLAN?: NO
3. HAVE YOU BEEN THINKING ABOUT HOW YOU MIGHT KILL YOURSELF?: NO
TOTAL  NUMBER OF ABORTED OR SELF INTERRUPTED ATTEMPTS PAST LIFETIME: NO
TOTAL  NUMBER OF INTERRUPTED ATTEMPTS PAST 3 MONTHS: NO
6. HAVE YOU EVER DONE ANYTHING, STARTED TO DO ANYTHING, OR PREPARED TO DO ANYTHING TO END YOUR LIFE?: NO
2. HAVE YOU ACTUALLY HAD ANY THOUGHTS OF KILLING YOURSELF LIFETIME?: NO
1. IN THE PAST MONTH, HAVE YOU WISHED YOU WERE DEAD OR WISHED YOU COULD GO TO SLEEP AND NOT WAKE UP?: NO
4. HAVE YOU HAD THESE THOUGHTS AND HAD SOME INTENTION OF ACTING ON THEM?: NO

## 2021-10-12 ASSESSMENT — ANXIETY QUESTIONNAIRES
1. FEELING NERVOUS, ANXIOUS, OR ON EDGE: NOT AT ALL
GAD7 TOTAL SCORE: 2
6. BECOMING EASILY ANNOYED OR IRRITABLE: SEVERAL DAYS
5. BEING SO RESTLESS THAT IT IS HARD TO SIT STILL: NOT AT ALL
3. WORRYING TOO MUCH ABOUT DIFFERENT THINGS: SEVERAL DAYS
7. FEELING AFRAID AS IF SOMETHING AWFUL MIGHT HAPPEN: NOT AT ALL
2. NOT BEING ABLE TO STOP OR CONTROL WORRYING: NOT AT ALL
8. IF YOU CHECKED OFF ANY PROBLEMS, HOW DIFFICULT HAVE THESE MADE IT FOR YOU TO DO YOUR WORK, TAKE CARE OF THINGS AT HOME, OR GET ALONG WITH OTHER PEOPLE?: SOMEWHAT DIFFICULT
GAD7 TOTAL SCORE: 2
7. FEELING AFRAID AS IF SOMETHING AWFUL MIGHT HAPPEN: NOT AT ALL
4. TROUBLE RELAXING: NOT AT ALL
GAD7 TOTAL SCORE: 2

## 2021-10-12 ASSESSMENT — PATIENT HEALTH QUESTIONNAIRE - PHQ9
10. IF YOU CHECKED OFF ANY PROBLEMS, HOW DIFFICULT HAVE THESE PROBLEMS MADE IT FOR YOU TO DO YOUR WORK, TAKE CARE OF THINGS AT HOME, OR GET ALONG WITH OTHER PEOPLE: SOMEWHAT DIFFICULT
SUM OF ALL RESPONSES TO PHQ QUESTIONS 1-9: 10

## 2021-10-12 NOTE — PROGRESS NOTES
"[facility]  Provider Name:  Eduardo Fitch     Credentials:  TYLER SERRANO    PATIENT'S NAME: Klebo J Skjervold David  PREFERRED NAME: Mendy  PRONOUNS:  he/him/his     MRN: [id number]  : 2000  ADDRESS: [address] [address]  ACCT. NUMBER:  [id number]  DATE OF SERVICE: 10/12/21  START TIME: 10:11  END TIME: 11:08  PREFERRED PHONE: [phone]  May we leave a program related message: Yes  SERVICE MODALITY:  Video Visit:      Provider verified identity through the following two step process.  Patient provided:  Patient photo, Patient  and Patient was verified at admission/transfer    Telemedicine Visit: The patient's condition can be safely assessed and treated via synchronous audio and visual telemedicine encounter.      Reason for Telemedicine Visit: Services only offered telehealth    Originating Site (Patient Location): Patient's home    Distant Site (Provider Location): Provider Remote Setting- Home Office    Consent:  The patient/guardian has verbally consented to: the potential risks and benefits of telemedicine (video visit) versus in person care; bill my insurance or make self-payment for services provided; and responsibility for payment of non-covered services.     Patient would like the video invitation sent by:  My Chart    Mode of Communication:  Video Conference via [employer]    As the provider I attest to compliance with applicable laws and regulations related to telemedicine.    [place] ADULT Mental Health DIAGNOSTIC ASSESSMENT    Identifying Information:  Patient is a 21 year old, .  The pronoun use throughout this assessment reflects the patient's chosen pronoun.  Patient was referred for an assessment by family.  Patient attended the session with his mother.     Chief Complaint:   The reason for seeking services at this time is: \"lack of interest an motivation\".  The problem(s) began 12.    Patient has attempted to resolve these concerns " "in the past through previous therapy.    Social/Family History:  Patient reported they grew up in [facility] [place] [facility]  .  They were raised by biological parents;grandmother;grandfather  .  Parents were always together.  Patient reported that their childhood was \"fine.\"  Patient described their current relationships with family of origin as \"fine.\"     The patient describes their cultural background as .  Cultural influences and impact on patient's life structure, values, norms, and healthcare: very in volved in [place].  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    These factors will be addressed in the Preliminary Treatment plan. Patient identified their preferred language to be English. Patient reported they does not need the assistance of an  or other support involved in therapy.     Patient reported experienced significant delays in developmental tasks, such as fine motor skills/dysgraphia. .   Patient's highest education level was some college  .  Patient identified the following learning problems: attention and concentration.  Modifications will not be used to assist communication in therapy.  Patient reports he is  able to understand written materials.    Patient reported the following relationship history one significant relationship.  Patient's current relationship status is single for 3 months.   Patient identified their sexual orientation as heterosexual.  Patient reported having no child(tay). Patient identified parents as part of their support system.  Patient identified the quality of these relationships as good,  .      Patient's current living/housing situation involves other.  The immediate members of family and household include Flakito Ingram, 54,father and they " report that housing is stable.    Patient is currently student.  Patient reports their finances are obtained through employment;parents. Patient does not identify finances as a current stressor.      Patient reported that they have not been involved with the legal system. Patient does not report being under probation/ parole/ jurisdiction. They are not under any current court jurisdiction. .    Patient's Strengths and Limitations:  Patient identified the following strengths or resources that will help them succeed in treatment: family support. Things that may interfere with the patient's success in treatment include: none identified.     Personal and Family Medical History:  Patient does report a family history of mental health concerns.  Patient reports family history includes Allergies in his mother; Alzheimer Disease in his paternal grandfather; Anesthesia Reaction in his mother; Asthma in his mother; Cancer in his maternal grandfather and another family member; Cerebrovascular Disease in some other family members; Chemical Addiction in his paternal grandfather; Depression/Anxiety in his mother; Diabetes in his father, mother, paternal grandfather, and other family members; Glaucoma in an other family member; Hypertension in his maternal grandmother and mother; Known Genetic Syndrome in his maternal grandmother, mother, and other family members; Macular Degeneration in an other family member; Obesity in his mother; Prostate Cancer in an other family member; Thyroid Disease in his maternal grandmother, maternal grandmother, mother, mother, and other family members..     Patient does report Mental Health Diagnosis and/or Treatment.  Patient Patient reported the following previous diagnoses which include(s): ADHD;an anxiety disorder;depression .  Patient reported symptoms began in elementary school.  Patient has received mental health services in the past:  therapy  .  Psychiatric Hospitalizations: none.  Patient  denies a history of civil commitment.  Currently, patient none  receiving other mental health services.  These include none.         Patient has had a physical exam to rule out medical causes for current symptoms.  Date of last physical exam was within the past year. Client was encouraged to follow up with PCP if symptoms were to develop. The patient has a [place] Primary Care Provider, who is named Enrike Neal..  Patient reports no current medical and/or dental concerns.  Patient denies any issues with pain..   There are not significant appetite / nutritional concerns / weight changes.   Patient does report a history of head injury / trauma / cognitive impairment.  One concussion in middle school.    Patient reports current meds as:   No outpatient medications have been marked as taking for the 10/12/21 encounter (Virtual Visit) with Chetan Fitch LMFT.       Medication Adherence:  Patient reports taking.  taking prescribed medications as prescribed.    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     Innocent heart murmur      Myopia      Retinal degeneration      Retinal detachment      Stickler syndrome          Current Mental Status Exam:   Appearance:  Appropriate    Eye Contact:  Good   Psychomotor:  Normal       Gait / station:  no problem  Attitude / Demeanor: Cooperative  Interested Pleasant Attentive  Speech      Rate / Production: Normal/ Responsive      Volume:  Normal  volume      Language:  intact  Mood:   Anxious  Depressed  Normal  Affect:   Appropriate  Blunted  Subdued    Thought  Content: Clear  Rumination   Thought Process: Coherent  Logical       Associations: No loosening of associations  Insight:   Fair   Judgment:  Intact   Orientation:  All  Attention/concentration: Good    Rating Scales:    PHQ9:    PHQ-9 SCORE 11/2/2020 7/15/2021 10/12/2021   PHQ-9 Total Score - - -   PHQ-9 Total Score MyChart - - 10 (Moderate depression)   PHQ-9 Total Score 7 7 10       GAD7:    PETRA-7 SCORE 8/15/2017 11/21/2017 10/12/2021   Total Score - - 2 (minimal anxiety)   Total Score 3 7 2     CGI:     First:No data recorded    Most recentNo data recorded    Substance Use:  Patient did report a family history of substance use concerns; see medical history section for details.  Patient has not received chemical dependency treatment in the past.  Patient has not ever been to detox.      Patient is not currently receiving any chemical dependency treatment.           Substance History of use Age of first use Date of last use     Pattern and duration of use (include amounts and frequency)   Alcohol used in the past   01/16/2019 10/01/21 REPORTS SUBSTANCE USE: N/A   Cannabis   never used     REPORTS SUBSTANCE USE: N/A     Amphetamines   never used     REPORTS SUBSTANCE USE: N/A   Cocaine/crack    never used       REPORTS SUBSTANCE USE: N/A   Hallucinogens never used         REPORTS SUBSTANCE USE: N/A   Inhalants never used         REPORTS SUBSTANCE USE: N/A   Heroin never used         REPORTS SUBSTANCE USE: N/A   Other Opiates never used     REPORTS SUBSTANCE USE: N/A   Benzodiazepine   never used     REPORTS SUBSTANCE USE: N/A   Barbiturates never used     REPORTS SUBSTANCE USE: N/A   Over the counter meds never used     REPORTS SUBSTANCE USE: N/A   Caffeine used in the past 4   REPORTS SUBSTANCE USE: reports using substance 1 times per day and has 1 cup at a time.   Patient reports heaviest use was in the past.   Nicotine  used in the past 14 10/12/21 REPORTS SUBSTANCE USE: reports using substance 10 times per day  and has 1 cigarette at a time.   Patient reports heaviest use was in the past.   Other substances not listed above:  Identify:  never used     REPORTS SUBSTANCE USE: N/A     Patient reported the following problems as a result of their substance use: no problems, not applicable.     CAGE- AID:    CAGE-AID Total Score 10/12/2021   Total Score 0   Total Score MyChart 0 (A total score of 2 or greater is considered clinically significant)       Substance Use: No symptoms    Based on the negative CAGE score and clinical interview there  are not indications of drug or alcohol abuse.      Significant Losses / Trauma / Abuse / Neglect Issues:   Patient did not serve in the .  There are indications or report of significant loss, trauma, abuse or neglect issues related to: death of grandfather at age 11.  Concerns for possible neglect are not present.    Safety Assessment:   Current Safety Concerns:  [place] Suicide Severity Rating Scale (Lifetime/Recent)  [place] Suicide Severity Rating (Lifetime/Recent) 10/12/2021   1. Wish to be Dead (Lifetime) No   1. Wish to be Dead (Recent) No   2. Non-Specific Active Suicidal Thoughts (Lifetime) No   2. Non-Specific Active Suicidal Thoughts (Recent) No   3. Active Suicidal Ideation with any Methods (Not Plan) Without Intent to Act (Lifetime) No   3. Active Suicidal Ideation with any Methods (Not Plan) Without Intent to Act (Recent) No   4. Active Suicidal Ideation with Some Intent to Act, Without Specific Plan (Lifetime) No   4. Active Suicidal Ideation with Some Intent to Act, Without Specific Plan (Recent) No   5. Active Suicidal Ideation with Specific Plan and Intent (Lifetime) No   5. Active Suicidal Ideation with Specific Plan and Intent (Recent) No   Actual Attempt (Lifetime) No   Actual Attempt (Past 3 Months) No   Has subject engaged in non-suicidal self-injurious behavior? (Lifetime) No   Has subject engaged in non-suicidal self-injurious behavior? (Past 3 Months) No    Interrupted Attempts (Lifetime) No   Interrupted Attempts (Past 3 Months) No   Aborted or Self-Interrupted Attempt (Lifetime) No   Aborted or Self-Interrupted Attempt (Past 3 Months) No   Preparatory Acts or Behavior (Lifetime) No   Preparatory Acts or Behavior (Past 3 Months) No     Patient denies current homicidal ideation and behaviors.  Patient denies current self-injurious ideation and behaviors.    Patient denied risk behaviors associated with substance use.  Patient denies any high risk behaviors associated with mental health symptoms.  Patient reports the following current concerns for their personal safety: None.  Patient reports there are not firearms in the house.    History of Safety Concerns:  Patient denied a history of homicidal ideation.     Patient denied a history of personal safety concerns.    Patient denied a history of assaultive behaviors.    Patient denied a history of sexual assault behaviors.     Patient denied a history of risk behaviors associated with substance use.  Patient denies any history of high risk behaviors associated with mental health symptoms.  Patient reports the following protective factors: dedication to family or friends;safe and stable environment    Risk Plan:  See Recommendations for Safety and Risk Management Plan    Review of Symptoms per patient report:  Depression: Change in sleep, Lack of interest, Excessive or inappropriate guilt, Change in energy level, Difficulties concentrating, Change in appetite, Feelings of hopelessness, Low self-worth, Ruminations, Irritability and Feeling sad, down, or depressed  Yancy:  No Symptoms  Psychosis: No Symptoms  Anxiety: Excessive worry and Irritability  Panic:  No symptoms  Post Traumatic Stress Disorder:  Experienced traumatic event death of grandfather at age 11, Increased arousal and Impaired functioning   Eating Disorder: No Symptoms  ADD / ADHD:  Inattentive, Poor task completion, Distractibility and Forgetful  Conduct  "Disorder: No symptoms  Autism Spectrum Disorder: No symptoms  Obsessive Compulsive Disorder: No Symptoms    Patient reports the following compulsive behaviors and treatment history: None reported.      Diagnostic Criteria:   A) Recurrent episode(s) - symptoms have been present during the same 2-week period and represent a change from previous functioning 5 or more symptoms (required for diagnosis)   - Depressed mood. Note: In children and adolescents, can be irritable mood.     - Diminished interest or pleasure in all, or almost all, activities.    - Significant weight / appetite disturbance.    - Disrupted sleep.    - Psychomotor activity retardation.    - Fatigue or loss of energy.    - Feelings of worthlessness or inappropriate and excessive guilt.    - Diminished ability to think or concentrate, or indecisiveness.   B) The symptoms cause clinically significant distress or impairment in social, occupational, or other important areas of functioning  C) The episode is not attributable to the physiological effects of a substance or to another medical condition  D) The occurence of major depressive episode is not better explained by other thought / psychotic disorders  E) There has never been a manic episode or hypomanic episode    Functional Status:  Patient reports the following functional impairments: academic performance, educational activities, home life with family members, relationship(s) and social interactions.     WHODAS:   WHODAS 2.0 Total Score 10/12/2021   Total Score 12   Total Score MyChart 12     Nonprogrammatic care:  Patient is requesting basic services to address current mental health concerns.    Clinical Summary:  1. Reason for assessment: \"lack of interest an motivation\"  2. Psychosocial, Cultural and Contextual Factors: .  Cultural influences and impact on patient's life structure, values, norms, and healthcare: very in volved in [place].  Contextual influences on patient's health " include: Individual Factors patient has Stickler's Syndrome/hearing loss/heart blockage and Family Factors grandfather diagnosed with cancer when patient was 10, lived with him for support for six months before his death.  3. Principal DSM5 Diagnoses  (Sustained by DSM5 Criteria Listed Above):   296.35 (F33.41)  Major Depressive Disorder, Recurrent Episode, In partial remission _.  4. Other Diagnoses that is relevant to services: Hx of  Attention-Deficit/Hyperactivity Disorder  314.00 (F90.0) Predominantly inattentive presentation  300.02 (F41.1) Generalized Anxiety Disorder.R/O Posttraumatic Stress Disorder  5. Provisional Diagnosis: None.  6. Prognosis: Expect Improvement, Relieve Acute Symptoms and Maintain Current Status / Prevent Deterioration.  7. Likely consequences of symptoms if not treated: further deterioration resulting in need for higher level of care..  8. Client strengths include:  employed, has a previous history of therapy, support of family, friends and providers and work history .     Recommendations:     1. Plan for Safety and Risk Management:   Recommended that patient call 911 or go to the local ED should there be a change in any of these risk factors..          Report to child / adult protection services was NA.     2. Patient's identified mental health concerns with a cultural influence will be addressed by processing in therapy as needed.     3. Initial Treatment will focus on:    Depressed Mood - improve mood.     4. Resources/Service Plan:    services are not indicated.   Modifications to assist communication are not indicated.   Additional disability accommodations are not indicated.      5. Collaboration:   Collaboration / coordination of treatment will be initiated with the following  support professionals: primary care physician.      6.  Referrals:   The following referral(s) will be initiated: None at this time. Next Scheduled Appointment: 10/22/21.     A Release of  Information has been obtained for the following: None needed at this time.    7. Records:   These were not available for review at time of assessment.   Information in this assessment was obtained from the medical record and  provided by patient and family who is a fair historian.    Patient will have open access to their mental health medical record.      Provider Name/ Credentials:  Eduardo Fitch M.A., [facility]  October 12, 2021

## 2021-10-12 NOTE — Clinical Note
Greharis, Dr. Neal!  I met with Mendy and his mother for Diagnostic Assessment/therapy intake.  Plan for therapy will be to focus on mood improvement and possible (re-)processing of past trauma.  Looking forward to working further with Aubreymónica!

## 2021-10-13 ASSESSMENT — ANXIETY QUESTIONNAIRES: GAD7 TOTAL SCORE: 2

## 2021-11-05 ENCOUNTER — VIRTUAL VISIT (OUTPATIENT)
Dept: PSYCHOLOGY | Facility: CLINIC | Age: 21
End: 2021-11-05
Payer: COMMERCIAL

## 2021-11-05 DIAGNOSIS — F33.41 MAJOR DEPRESSIVE DISORDER, RECURRENT, IN PARTIAL REMISSION (H): Primary | ICD-10-CM

## 2021-11-05 PROCEDURE — 90834 PSYTX W PT 45 MINUTES: CPT | Mod: GT | Performed by: MARRIAGE & FAMILY THERAPIST

## 2021-11-05 ASSESSMENT — ANXIETY QUESTIONNAIRES
7. FEELING AFRAID AS IF SOMETHING AWFUL MIGHT HAPPEN: NOT AT ALL
2. NOT BEING ABLE TO STOP OR CONTROL WORRYING: NOT AT ALL
1. FEELING NERVOUS, ANXIOUS, OR ON EDGE: SEVERAL DAYS
5. BEING SO RESTLESS THAT IT IS HARD TO SIT STILL: NOT AT ALL
7. FEELING AFRAID AS IF SOMETHING AWFUL MIGHT HAPPEN: NOT AT ALL
GAD7 TOTAL SCORE: 3
3. WORRYING TOO MUCH ABOUT DIFFERENT THINGS: SEVERAL DAYS
GAD7 TOTAL SCORE: 3
8. IF YOU CHECKED OFF ANY PROBLEMS, HOW DIFFICULT HAVE THESE MADE IT FOR YOU TO DO YOUR WORK, TAKE CARE OF THINGS AT HOME, OR GET ALONG WITH OTHER PEOPLE?: SOMEWHAT DIFFICULT
6. BECOMING EASILY ANNOYED OR IRRITABLE: SEVERAL DAYS
4. TROUBLE RELAXING: NOT AT ALL
GAD7 TOTAL SCORE: 3

## 2021-11-05 ASSESSMENT — PATIENT HEALTH QUESTIONNAIRE - PHQ9
SUM OF ALL RESPONSES TO PHQ QUESTIONS 1-9: 4
SUM OF ALL RESPONSES TO PHQ QUESTIONS 1-9: 4
10. IF YOU CHECKED OFF ANY PROBLEMS, HOW DIFFICULT HAVE THESE PROBLEMS MADE IT FOR YOU TO DO YOUR WORK, TAKE CARE OF THINGS AT HOME, OR GET ALONG WITH OTHER PEOPLE: SOMEWHAT DIFFICULT

## 2021-11-05 NOTE — PROGRESS NOTES
Progress Note    Patient Name: Klebo J Skjervold David  Date: 11/5/21         Service Type: Individual      Session Start Time: 1:01  Session End Time: 1:48     Session Length: 47    Session #: 1    Attendees: Client attended alone    Service Modality:  Video Visit:      Provider verified identity through the following two step process.  Patient provided:  Patient photo and Patient was verified at admission/transfer    Telemedicine Visit: The patient's condition can be safely assessed and treated via synchronous audio and visual telemedicine encounter.      Reason for Telemedicine Visit: Services only offered telehealth    Originating Site (Patient Location): Patient's home    Distant Site (Provider Location): Provider Remote Setting- Home Office    Consent:  The patient/guardian has verbally consented to: the potential risks and benefits of telemedicine (video visit) versus in person care; bill my insurance or make self-payment for services provided; and responsibility for payment of non-covered services.     Patient would like the video invitation sent by:  My Chart    Mode of Communication:  Video Conference via Amwell    As the provider I attest to compliance with applicable laws and regulations related to telemedicine.     Treatment Plan Last Reviewed: 11/5/21, next due 2/5/22.  PHQ-9 / PETRA-7 : completed.    DATA  Interactive Complexity: No  Crisis: No       Progress Since Last Session (Related to Symptoms / Goals / Homework):   Symptoms: Worsening increased PETRA-7 score.    Homework: Achieved / completed to satisfaction  Completed in session      Episode of Care Goals: Minimal progress - PREPARATION (Decided to change - considering how); Intervened by negotiating a change plan and determining options / strategies for behavior change, identifying triggers, exploring social supports, and working towards setting a date to begin behavior change     Current / Ongoing  Stressors and Concerns:   Patient reported continuing to experience some depressive symptoms.  Patient identified wanting to work on mood improvement/life structure/organization as his desired therapy goal.     Treatment Objective(s) Addressed in This Session:   Increase interest, engagement, and pleasure in doing things  Decrease frequency and intensity of feeling down, depressed, hopeless  Patient identified his desired therapy goal.     Intervention:   DBT: reviewed with patient working on increasing consistency in his sleep schedule and scheduling some structure of at least 30 minutes per day.  Motivational Interviewing: reviewed with patient his desired therapy goal.        ASSESSMENT: Current Emotional / Mental Status (status of significant symptoms):   Risk status (Self / Other harm or suicidal ideation)   Patient denies current fears or concerns for personal safety.   Patient denies current or recent suicidal ideation or behaviors.   Patient denies current or recent homicidal ideation or behaviors.   Patient denies current or recent self injurious behavior or ideation.   Patient denies other safety concerns.   Patient reports there has been no change in risk factors since their last session.     Patient reports there has been no change in protective factors since their last session.     Recommended that patient call 911 or go to the local ED should there be a change in any of these risk factors.     Appearance:   Appropriate    Eye Contact:   Good    Psychomotor Behavior: Normal    Attitude:   Cooperative  Interested Friendly Pleasant Attentive   Orientation:   All   Speech    Rate / Production: Normal/ Responsive Monotone     Volume:  Normal    Mood:    Depressed  Normal Anhedonia   Affect:    Appropriate  Blunted  Subdued    Thought Content:  Clear  Rumination    Thought Form:  Coherent  Logical    Insight:    Good  and Intellectual Insight     Medication Review:   No changes to current psychiatric  medication(s)     Medication Compliance:   Yes     Changes in Health Issues:   None reported     Chemical Use Review:   Substance Use: Chemical use reviewed, no active concerns identified      Tobacco Use: No current tobacco use.      Diagnosis:  1. Major depressive disorder, recurrent, in partial remission (H)        Collateral Reports Completed:   Not Applicable    PLAN: (Patient Tasks / Therapist Tasks / Other)  Patient agreed to work on increasing consistency in his sleep schedule and scheduling some structure of at least 30 minutes per day.    Chetan Fitch, LMFT 11/5/21                                                         ______________________________________________________________________    Treatment Plan    Patient's Name: Klebo J Skjervold David  YOB: 2000    Date: 11/5/21    DSM5 Diagnoses: 296.35 (F33.41)  Major Depressive Disorder, Recurrent Episode, In partial remission _  Psychosocial / Contextual Factors: .  Cultural influences and impact on patient's life structure, values, norms, and healthcare: very in volved in [place].  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  WHODAS: 10    Referral / Collaboration:  Referral to another professional/service is not indicated at this time..    Anticipated number of session or this episode of care: 11-20      MeasurableTreatment Goal(s) related to diagnosis / functional impairment(s)  Goal 1: Patient will improve overall baseline mood by 2 points on a 1-10 Likert scale per self-report (10 = optimal mood).    I will know I've met my goal when I feel better/less depressed overall and have improved my life structure/organization.      Objective #A (Client Action)    Status: New - Date:  11/5/21    Patient will  Identify negative self-talk and behaviors: challenge core beliefs, myths, and actions.    Intervention(s)  Therapist will teach emotional regulation skills. CBT/REBT ABCD model.    Objective #B  Patient will Increase interest, engagement, and pleasure in doing things  Decrease frequency and intensity of feeling down, depressed, hopeless  Improve concentration, focus, and mindfulness in daily activities .    Status: New - Date:  11/5/21    Intervention(s)  Therapist will teach emotional regulation skills. DBT Core Mindfulness, Distress Tolerance, Emotion Regulation, and Interpersonal Effetiveness skills.    Patient has reviewed and agreed to the above plan.      Chetan Fitch, LMFT  November 5, 2021

## 2021-11-06 ASSESSMENT — PATIENT HEALTH QUESTIONNAIRE - PHQ9: SUM OF ALL RESPONSES TO PHQ QUESTIONS 1-9: 4

## 2021-11-06 ASSESSMENT — ANXIETY QUESTIONNAIRES: GAD7 TOTAL SCORE: 3

## 2021-11-19 ENCOUNTER — VIRTUAL VISIT (OUTPATIENT)
Dept: PSYCHOLOGY | Facility: CLINIC | Age: 21
End: 2021-11-19
Payer: COMMERCIAL

## 2021-11-19 DIAGNOSIS — F33.41 MAJOR DEPRESSIVE DISORDER, RECURRENT, IN PARTIAL REMISSION (H): Primary | ICD-10-CM

## 2021-11-19 PROCEDURE — 90834 PSYTX W PT 45 MINUTES: CPT | Mod: TEL | Performed by: MARRIAGE & FAMILY THERAPIST

## 2021-11-19 ASSESSMENT — PATIENT HEALTH QUESTIONNAIRE - PHQ9
SUM OF ALL RESPONSES TO PHQ QUESTIONS 1-9: 2
10. IF YOU CHECKED OFF ANY PROBLEMS, HOW DIFFICULT HAVE THESE PROBLEMS MADE IT FOR YOU TO DO YOUR WORK, TAKE CARE OF THINGS AT HOME, OR GET ALONG WITH OTHER PEOPLE: NOT DIFFICULT AT ALL
SUM OF ALL RESPONSES TO PHQ QUESTIONS 1-9: 2

## 2021-11-19 ASSESSMENT — ANXIETY QUESTIONNAIRES
GAD7 TOTAL SCORE: 0
7. FEELING AFRAID AS IF SOMETHING AWFUL MIGHT HAPPEN: NOT AT ALL
5. BEING SO RESTLESS THAT IT IS HARD TO SIT STILL: NOT AT ALL
GAD7 TOTAL SCORE: 0
3. WORRYING TOO MUCH ABOUT DIFFERENT THINGS: NOT AT ALL
6. BECOMING EASILY ANNOYED OR IRRITABLE: NOT AT ALL
1. FEELING NERVOUS, ANXIOUS, OR ON EDGE: NOT AT ALL
7. FEELING AFRAID AS IF SOMETHING AWFUL MIGHT HAPPEN: NOT AT ALL
2. NOT BEING ABLE TO STOP OR CONTROL WORRYING: NOT AT ALL
GAD7 TOTAL SCORE: 0
4. TROUBLE RELAXING: NOT AT ALL
8. IF YOU CHECKED OFF ANY PROBLEMS, HOW DIFFICULT HAVE THESE MADE IT FOR YOU TO DO YOUR WORK, TAKE CARE OF THINGS AT HOME, OR GET ALONG WITH OTHER PEOPLE?: NOT DIFFICULT AT ALL

## 2021-11-19 NOTE — PROGRESS NOTES
Progress Note    Patient Name: Klebo J Skjervold David  Date: 11/19/21         Service Type: Individual      Session Start Time: 1:00  Session End Time: 1:38     Session Length: 38    Session #: 2    Attendees: Client attended alone    Service Modality:  Video Visit:      Provider verified identity through the following two step process.  Patient provided:  Patient photo and Patient was verified at admission/transfer    Telemedicine Visit: The patient's condition can be safely assessed and treated via synchronous audio and visual telemedicine encounter.      Reason for Telemedicine Visit: Services only offered telehealth    Originating Site (Patient Location): Patient's home    Distant Site (Provider Location): Provider Remote Setting- Home Office    Consent:  The patient/guardian has verbally consented to: the potential risks and benefits of telemedicine (video visit) versus in person care; bill my insurance or make self-payment for services provided; and responsibility for payment of non-covered services.     Patient would like the video invitation sent by:  My Chart    Mode of Communication:  Video Conference via Amwell    As the provider I attest to compliance with applicable laws and regulations related to telemedicine.     Treatment Plan Last Reviewed: 11/5/21, next due 2/5/22.  PHQ-9 / PETRA-7 : completed.    DATA  Interactive Complexity: No  Crisis: No       Progress Since Last Session (Related to Symptoms / Goals / Homework):   Symptoms: Improving decreased PHQ-9 and PETRA-7 scores.    Homework: Partially completed      Episode of Care Goals: Satisfactory progress - ACTION (Actively working towards change); Intervened by reinforcing change plan / affirming steps taken     Current / Ongoing Stressors and Concerns:   Patient reported experiencing continued, though decreased, depressive symptoms.  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     Treatment Objective(s) Addressed in This Session:   Increase interest, engagement, and pleasure in doing things  Decrease frequency and intensity of feeling down, depressed, hopeless     Intervention:   CBT: reviewed with patient continuing to focus on being active and pursuing his goals.        ASSESSMENT: Current Emotional / Mental Status (status of significant symptoms):   Risk status (Self / Other harm or suicidal ideation)   Patient denies current fears or concerns for personal safety.   Patient denies current or recent suicidal ideation or behaviors.   Patient denies current or recent homicidal ideation or behaviors.   Patient denies current or recent self injurious behavior or ideation.   Patient denies other safety concerns.   Patient reports there has been no change in risk factors since their last session.     Patient reports there has been no change in protective factors since their last session.     Recommended that patient call 911 or go to the local ED should there be a change in any of these risk factors.     Appearance:   Appropriate    Eye Contact:   Good    Psychomotor Behavior: Normal    Attitude:   Cooperative  Interested Friendly Pleasant Attentive   Orientation:   All   Speech    Rate / Production: Normal/ Responsive Monotone     Volume:  Normal    Mood:    Depressed  Normal Anhedonia   Affect:    Appropriate  Blunted  Subdued    Thought Content:  Clear  Rumination    Thought Form:  Coherent  Logical    Insight:    Good  and Intellectual Insight     Medication Review:   No changes to current psychiatric medication(s)     Medication Compliance:   Yes     Changes in Health Issues:   None reported     Chemical Use Review:   Substance Use: Chemical use reviewed, no active concerns identified      Tobacco Use: No current tobacco use.      Diagnosis:  1. Major depressive disorder, recurrent, in partial remission (H)        Collateral Reports  Completed:   Not Applicable    PLAN: (Patient Tasks / Therapist Tasks / Other)  Patient agreed to work on continuing to focus on being active and pursuing his goals.    Chetan Gardnerdilan Fitch, LMFT 11/19/21                                                         ______________________________________________________________________    Treatment Plan    Patient's Name: Klebo J Skjervold David  YOB: 2000    Date: 11/5/21    DSM5 Diagnoses: 296.35 (F33.41)  Major Depressive Disorder, Recurrent Episode, In partial remission _  Psychosocial / Contextual Factors: .  Cultural influences and impact on patient's life structure, values, norms, and healthcare: very in volved in [place].  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  WHODAS: 10    Referral / Collaboration:  Referral to another professional/service is not indicated at this time..    Anticipated number of session or this episode of care: 11-20      MeasurableTreatment Goal(s) related to diagnosis / functional impairment(s)  Goal 1: Patient will improve overall baseline mood by 2 points on a 1-10 Likert scale per self-report (10 = optimal mood).    I will know I've met my goal when I feel better/less depressed overall and have improved my life structure/organization.      Objective #A (Client Action)    Status: New - Date:  11/5/21    Patient will Identify negative self-talk and behaviors: challenge core beliefs, myths, and actions.    Intervention(s)  Therapist will teach emotional regulation skills. CBT/REBT ABCD model.    Objective #B  Patient will Increase interest, engagement, and pleasure in doing things  Decrease frequency and intensity of feeling down, depressed, hopeless  Improve concentration, focus, and mindfulness  in daily activities .    Status: New - Date:  11/5/21    Intervention(s)  Therapist will teach emotional regulation skills. DBT Core Mindfulness, Distress Tolerance, Emotion Regulation, and Interpersonal Effetiveness skills.    Patient has reviewed and agreed to the above plan.      Chetan Fitch, LMFT  November 5, 2021

## 2021-11-20 ASSESSMENT — PATIENT HEALTH QUESTIONNAIRE - PHQ9: SUM OF ALL RESPONSES TO PHQ QUESTIONS 1-9: 2

## 2021-11-20 ASSESSMENT — ANXIETY QUESTIONNAIRES: GAD7 TOTAL SCORE: 0

## 2021-12-10 ENCOUNTER — VIRTUAL VISIT (OUTPATIENT)
Dept: PSYCHOLOGY | Facility: CLINIC | Age: 21
End: 2021-12-10
Payer: COMMERCIAL

## 2021-12-10 DIAGNOSIS — F33.41 MAJOR DEPRESSIVE DISORDER, RECURRENT, IN PARTIAL REMISSION (H): Primary | ICD-10-CM

## 2021-12-10 PROCEDURE — 90834 PSYTX W PT 45 MINUTES: CPT | Mod: GT | Performed by: MARRIAGE & FAMILY THERAPIST

## 2021-12-10 ASSESSMENT — ANXIETY QUESTIONNAIRES
7. FEELING AFRAID AS IF SOMETHING AWFUL MIGHT HAPPEN: NOT AT ALL
5. BEING SO RESTLESS THAT IT IS HARD TO SIT STILL: NOT AT ALL
1. FEELING NERVOUS, ANXIOUS, OR ON EDGE: NOT AT ALL
GAD7 TOTAL SCORE: 0
GAD7 TOTAL SCORE: 0
2. NOT BEING ABLE TO STOP OR CONTROL WORRYING: NOT AT ALL
3. WORRYING TOO MUCH ABOUT DIFFERENT THINGS: NOT AT ALL
6. BECOMING EASILY ANNOYED OR IRRITABLE: NOT AT ALL
7. FEELING AFRAID AS IF SOMETHING AWFUL MIGHT HAPPEN: NOT AT ALL
4. TROUBLE RELAXING: NOT AT ALL
GAD7 TOTAL SCORE: 0

## 2021-12-10 NOTE — PROGRESS NOTES
Progress Note    Patient Name: Klebo J Skjervold David  Date: 12/10/21         Service Type: Individual      Session Start Time: 12:05  Session End Time: 12:45     Session Length: 40    Session #: 3    Attendees: Client attended alone    Service Modality:  Video Visit:      Provider verified identity through the following two step process.  Patient provided:  Patient photo and Patient was verified at admission/transfer    Telemedicine Visit: The patient's condition can be safely assessed and treated via synchronous audio and visual telemedicine encounter.      Reason for Telemedicine Visit: Services only offered telehealth    Originating Site (Patient Location): Patient's home    Distant Site (Provider Location): Provider Remote Setting- Home Office    Consent:  The patient/guardian has verbally consented to: the potential risks and benefits of telemedicine (video visit) versus in person care; bill my insurance or make self-payment for services provided; and responsibility for payment of non-covered services.     Patient would like the video invitation sent by:  My Chart    Mode of Communication:  Video Conference via Amwell    As the provider I attest to compliance with applicable laws and regulations related to telemedicine.     Treatment Plan Last Reviewed: 11/5/21, next due 2/5/22.  PHQ-9 / PETRA-7 : completed.    DATA  Interactive Complexity: No  Crisis: No       Progress Since Last Session (Related to Symptoms / Goals / Homework):   Symptoms: No change stable    Homework: Partially completed      Episode of Care Goals: Minimal progress - ACTION (Actively working towards change); Intervened by reinforcing change plan / affirming steps taken     Current / Ongoing Stressors and Concerns:   Patient reported continuing to experienced depressive and anxiety symptoms lately.  Patient reported struggling with waiting to see about his desired school change.  Patient reported  history of starting hobbies but getting away from them.     Treatment Objective(s) Addressed in This Session:   use at least some coping skills for anxiety management in the next few weeks  Increase interest, engagement, and pleasure in doing things  Decrease frequency and intensity of feeling down, depressed, hopeless     Intervention:   DBT: reviewed with patient engaging in enjoyable activities for distraction.        ASSESSMENT: Current Emotional / Mental Status (status of significant symptoms):   Risk status (Self / Other harm or suicidal ideation)   Patient denies current fears or concerns for personal safety.   Patient denies current or recent suicidal ideation or behaviors.   Patient denies current or recent homicidal ideation or behaviors.   Patient denies current or recent self injurious behavior or ideation.   Patient denies other safety concerns.   Patient reports there has been no change in risk factors since their last session.     Patient reports there has been no change in protective factors since their last session.     Recommended that patient call 911 or go to the local ED should there be a change in any of these risk factors.     Appearance:   Appropriate    Eye Contact:   Good    Psychomotor Behavior: Normal    Attitude:   Cooperative  Interested Friendly Pleasant Attentive   Orientation:   All   Speech    Rate / Production: Normal/ Responsive Monotone     Volume:  Normal    Mood:    Depressed  Normal Anhedonia   Affect:    Appropriate  Blunted  Subdued    Thought Content:  Clear  Rumination    Thought Form:  Coherent  Logical    Insight:    Good  and Intellectual Insight     Medication Review:   No changes to current psychiatric medication(s)     Medication Compliance:   Yes     Changes in Health Issues:   None reported     Chemical Use Review:   Substance Use: Chemical use reviewed, no active concerns identified      Tobacco Use: No current tobacco use.      Diagnosis:  1. Major depressive  disorder, recurrent, in partial remission (H)        Collateral Reports Completed:   Not Applicable    PLAN: (Patient Tasks / Therapist Tasks / Other)  Patient agreed to work on engaging in enjoyable activities for distraction.    Chetan Fitch, LMFT 12/10/21                                                         ______________________________________________________________________    Treatment Plan    Patient's Name: Klebo J Skjervold David  YOB: 2000    Date: 11/5/21    DSM5 Diagnoses: 296.35 (F33.41)  Major Depressive Disorder, Recurrent Episode, In partial remission _  Psychosocial / Contextual Factors: .  Cultural influences and impact on patient's life structure, values, norms, and healthcare: very in volved in [place].  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  WHODAS: 12    Referral / Collaboration:  Referral to another professional/service is not indicated at this time..    Anticipated number of session or this episode of care: 11-20      MeasurableTreatment Goal(s) related to diagnosis / functional impairment(s)  Goal 1: Patient will improve overall baseline mood by 2 points on a 1-10 Likert scale per self-report (10 = optimal mood).    I will know I've met my goal when I feel better/less depressed overall and have improved my life structure/organization.      Objective #A (Client Action)    Status: New - Date:  11/5/21    Patient will Identify negative self-talk and behaviors: challenge core beliefs, myths, and actions.    Intervention(s)  Therapist will teach emotional regulation skills. CBT/REBT ABCD model.    Objective #B  Patient will Increase interest, engagement, and pleasure in doing things  Decrease frequency and intensity of feeling down,  depressed, hopeless  Improve concentration, focus, and mindfulness in daily activities .    Status: New - Date:  11/5/21    Intervention(s)  Therapist will teach emotional regulation skills. DBT Core Mindfulness, Distress Tolerance, Emotion Regulation, and Interpersonal Effetiveness skills.    Patient has reviewed and agreed to the above plan.      TYLER Daniels  November 5, 2021

## 2021-12-11 ASSESSMENT — ANXIETY QUESTIONNAIRES: GAD7 TOTAL SCORE: 0

## 2021-12-11 ASSESSMENT — PATIENT HEALTH QUESTIONNAIRE - PHQ9: SUM OF ALL RESPONSES TO PHQ QUESTIONS 1-9: 2

## 2021-12-21 ENCOUNTER — TELEPHONE (OUTPATIENT)
Dept: PSYCHOLOGY | Facility: CLINIC | Age: 21
End: 2021-12-21
Payer: COMMERCIAL

## 2021-12-23 ENCOUNTER — ANCILLARY PROCEDURE (OUTPATIENT)
Dept: CARDIOLOGY | Facility: CLINIC | Age: 21
End: 2021-12-23
Attending: PEDIATRICS
Payer: COMMERCIAL

## 2021-12-23 ENCOUNTER — OFFICE VISIT (OUTPATIENT)
Dept: CARDIOLOGY | Facility: CLINIC | Age: 21
End: 2021-12-23
Payer: COMMERCIAL

## 2021-12-23 VITALS
RESPIRATION RATE: 12 BRPM | WEIGHT: 251.54 LBS | DIASTOLIC BLOOD PRESSURE: 79 MMHG | HEART RATE: 80 BPM | HEIGHT: 69 IN | SYSTOLIC BLOOD PRESSURE: 147 MMHG | BODY MASS INDEX: 37.26 KG/M2 | OXYGEN SATURATION: 98 %

## 2021-12-23 DIAGNOSIS — Q89.8 STICKLER SYNDROME: ICD-10-CM

## 2021-12-23 DIAGNOSIS — R94.31 ABNORMAL ELECTROCARDIOGRAM: Primary | ICD-10-CM

## 2021-12-23 DIAGNOSIS — R94.31 ABNORMAL ELECTROCARDIOGRAM: ICD-10-CM

## 2021-12-23 PROCEDURE — 99213 OFFICE O/P EST LOW 20 MIN: CPT | Mod: 25 | Performed by: PEDIATRICS

## 2021-12-23 PROCEDURE — 93227 XTRNL ECG REC<48 HR R&I: CPT | Performed by: PEDIATRICS

## 2021-12-23 ASSESSMENT — PAIN SCALES - GENERAL: PAINLEVEL: NO PAIN (0)

## 2021-12-23 ASSESSMENT — MIFFLIN-ST. JEOR: SCORE: 2134.12

## 2021-12-23 NOTE — PATIENT INSTRUCTIONS
Thank you for choosing [facility]. It was a pleasure to see you for your office visit today.     If you have any questions or scheduling needs during regular office hours, please call our [place] clinic: [phone]   If urgent concerns arise after hours, you can call [phone] and ask to speak to the pediatric specialist on call.   If you need to schedule Radiology tests, please call: [phone]  My Chart messages are for routine communication and questions and are usually answered within 48-72 hours. If you have an urgent concern or require sooner response, please call us.  Outside lab and imaging results should be faxed to [phone].  If you go to a lab outside of [facility] we will not automatically get those results. You will need to ask to have them faxed.       If you had any blood work, imaging or other tests completed today:  Normal test results will be mailed to your home address in a letter.  Abnormal results will be communicated to you via phone call/letter.  Please allow up to 1-2 weeks for processing and interpretation of most lab work.

## 2021-12-23 NOTE — PROGRESS NOTES
"                                               PEDS Cardiac Consult Letter  Date: 2021      Enrike Neal MD  [address] [place] [address]      PATIENT: Klebo J Skjervold David  :          2000   ELIANE:          2021    Dear Dr. Neal:    Mendy is 21 years old and was seen at the [facility] on 2021.   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  He is remained asymptomatic with no palpitations, chest pain or syncope.  He is fully vaccinated against coated.  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    On physical examination his height was 1.765 m (5' 9.49\") and his weight was 114.1 kg (251 lb 8.7 oz).  His heart rate was 80  and respirations 12 per minute.  The blood pressure in his right arm was 147/79.  He was acyanotic, warm and well perfused. He was alert cooperative and in no distress.  His lungs were clear to auscultation without respiratory distress.  He had a regular rhythm with no murmur.  The second heart sound was physiologically split with a normal pulmonary component.   There was no organomegaly or abdominal tenderness.  Peripheral pulses were 2+ and equal in all extremities.  There was no clubbing or edema.    An electrocardiogram performed today that I personally reviewed and explained to him and his father showed sinus rhythm with first-degree heart block.  The [place] interval was 232 ms. The corrected QT interval was 414 ms.    Mendy has  Stickler syndrome with fairly stable first-degree heart block.  We have performed a 24-hour ECG monitor 5 years ago that was normal, and I have elected to repeat that today despite his lack of symptoms.  I recommend that he return in 2 years with an electrocardiogram.  I will contact them with the results of his ECG monitor when they become available.      Thank you very much for your " confidence in allowing me to participate in Klebo's care.  If you have any questions or concerns, please don't hesitate to contact me.    Sincerely,      Aaron Pagan M.D.   Pediatric Cardiology   [facility]  [phone]    Note: Chart documentation done in part with Dragon Voice Recognition software. Although reviewed after completion, some word and grammatical errors may remain.

## 2021-12-28 ENCOUNTER — VIRTUAL VISIT (OUTPATIENT)
Dept: PSYCHOLOGY | Facility: CLINIC | Age: 21
End: 2021-12-28
Payer: COMMERCIAL

## 2021-12-28 DIAGNOSIS — F33.41 MAJOR DEPRESSIVE DISORDER, RECURRENT, IN PARTIAL REMISSION (H): Primary | ICD-10-CM

## 2021-12-28 PROCEDURE — 90834 PSYTX W PT 45 MINUTES: CPT | Mod: GT | Performed by: MARRIAGE & FAMILY THERAPIST

## 2021-12-28 ASSESSMENT — ANXIETY QUESTIONNAIRES
5. BEING SO RESTLESS THAT IT IS HARD TO SIT STILL: NOT AT ALL
7. FEELING AFRAID AS IF SOMETHING AWFUL MIGHT HAPPEN: NOT AT ALL
7. FEELING AFRAID AS IF SOMETHING AWFUL MIGHT HAPPEN: NOT AT ALL
3. WORRYING TOO MUCH ABOUT DIFFERENT THINGS: NOT AT ALL
4. TROUBLE RELAXING: NOT AT ALL
GAD7 TOTAL SCORE: 0
6. BECOMING EASILY ANNOYED OR IRRITABLE: NOT AT ALL
2. NOT BEING ABLE TO STOP OR CONTROL WORRYING: NOT AT ALL
1. FEELING NERVOUS, ANXIOUS, OR ON EDGE: NOT AT ALL

## 2021-12-28 ASSESSMENT — PATIENT HEALTH QUESTIONNAIRE - PHQ9
SUM OF ALL RESPONSES TO PHQ QUESTIONS 1-9: 0
10. IF YOU CHECKED OFF ANY PROBLEMS, HOW DIFFICULT HAVE THESE PROBLEMS MADE IT FOR YOU TO DO YOUR WORK, TAKE CARE OF THINGS AT HOME, OR GET ALONG WITH OTHER PEOPLE: NOT DIFFICULT AT ALL
SUM OF ALL RESPONSES TO PHQ QUESTIONS 1-9: 0

## 2021-12-28 NOTE — PROGRESS NOTES
Progress Note    Patient Name: Klebo J Skjervold David  Date: 12/28/21         Service Type: Individual      Session Start Time: 2:03  Session End Time: 2:55     Session Length: 52    Session #: 4    Attendees: Client attended alone    Service Modality:  Video Visit:      Provider verified identity through the following two step process.  Patient provided:  Patient photo and Patient was verified at admission/transfer    Telemedicine Visit: The patient's condition can be safely assessed and treated via synchronous audio and visual telemedicine encounter.      Reason for Telemedicine Visit: Services only offered telehealth    Originating Site (Patient Location): Patient's home    Distant Site (Provider Location): Provider Remote Setting- Home Office    Consent:  The patient/guardian has verbally consented to: the potential risks and benefits of telemedicine (video visit) versus in person care; bill my insurance or make self-payment for services provided; and responsibility for payment of non-covered services.     Patient would like the video invitation sent by:  My Chart    Mode of Communication:  Video Conference via [facility]    As the provider I attest to compliance with applicable laws and regulations related to telemedicine.     Treatment Plan Last Reviewed: 11/5/21, next due 2/5/22.  PHQ-9 / PETRA-7 : completed.    DATA  Interactive Complexity: No  Crisis: No       Progress Since Last Session (Related to Symptoms / Goals / Homework):   Symptoms: Improving decreased PHQ-9 score.    Homework: Partially completed      Episode of Care Goals: Minimal progress - ACTION (Actively working towards change); Intervened by reinforcing change plan / affirming steps taken     Current / Ongoing Stressors and Concerns:   Patient reported experiencing continued depressive and anxiety symptoms lately, though slightly decreased.  Patient reported struggling with holiday stress with extended  family.  Patient reported history of him and family being treated disrespectfully by his uncle and aunt, and wanting this to end but feeling conflicted due to wanting to maintain relationships with his cousins despite conflict.     Treatment Objective(s) Addressed in This Session:   compile a list of boundaries that they would like to set with others. family members.     Intervention:   Interpersonal Therapy: reviewed with patient considering boundaries to set with his extended family.        ASSESSMENT: Current Emotional / Mental Status (status of significant symptoms):   Risk status (Self / Other harm or suicidal ideation)   Patient denies current fears or concerns for personal safety.   Patient denies current or recent suicidal ideation or behaviors.   Patient denies current or recent homicidal ideation or behaviors.   Patient denies current or recent self injurious behavior or ideation.   Patient denies other safety concerns.   Patient reports there has been no change in risk factors since their last session.     Patient reports there has been no change in protective factors since their last session.     Recommended that patient call 911 or go to the local ED should there be a change in any of these risk factors.     Appearance:   Appropriate    Eye Contact:   Good    Psychomotor Behavior: Normal    Attitude:   Cooperative  Interested Friendly Pleasant Attentive   Orientation:   All   Speech    Rate / Production: Normal/ Responsive    Volume:  Normal    Mood:    Anxious  Depressed  Normal   Affect:    Appropriate  Subdued    Thought Content:  Clear  Rumination    Thought Form:  Coherent  Logical    Insight:    Good  and Intellectual Insight     Medication Review:   No changes to current psychiatric medication(s)     Medication Compliance:   Yes     Changes in Health Issues:   None reported     Chemical Use Review:   Substance Use: Chemical use reviewed, no active concerns identified      Tobacco Use: No current  tobacco use.      Diagnosis:  1. Major depressive disorder, recurrent, in partial remission (H)        Collateral Reports Completed:   Not Applicable    PLAN: (Patient Tasks / Therapist Tasks / Other)  Patient agreed to work on considering boundaries to set with his extended family.    Chetan Juárez Little, LMFT 12/28/21                                                         ______________________________________________________________________    Treatment Plan    Patient's Name: Klebo J Skjervold David  YOB: 2000    Date: 11/5/21    DSM5 Diagnoses: 296.35 (F33.41)  Major Depressive Disorder, Recurrent Episode, In partial remission _  Psychosocial / Contextual Factors: .  Cultural influences and impact on patient's life structure, values, norms, and healthcare: very in volved in [place].  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  WHODAS: 10    Referral / Collaboration:  Referral to another professional/service is not indicated at this time..    Anticipated number of session or this episode of care: 11-20      MeasurableTreatment Goal(s) related to diagnosis / functional impairment(s)  Goal 1: Patient will improve overall baseline mood by 2 points on a 1-10 Likert scale per self-report (10 = optimal mood).    I will know I've met my goal when I feel better/less depressed overall and have improved my life structure/organization.      Objective #A (Client Action)    Status: New - Date:  11/5/21    Patient will Identify negative self-talk and behaviors: challenge core beliefs, myths, and actions.    Intervention(s)  Therapist will teach emotional regulation skills. CBT/REBT ABCD model.    Objective #B  Patient will Increase interest, engagement, and pleasure in doing  things  Decrease frequency and intensity of feeling down, depressed, hopeless  Improve concentration, focus, and mindfulness in daily activities .    Status: New - Date:  11/5/21    Intervention(s)  Therapist will teach emotional regulation skills. DBT Core Mindfulness, Distress Tolerance, Emotion Regulation, and Interpersonal Effetiveness skills.    Patient has reviewed and agreed to the above plan.      Chetan Fitch, LMFT  November 5, 2021

## 2021-12-28 NOTE — PATIENT INSTRUCTIONS
ZioPatch ordered per Dr. Pagan and applied in clinic. ZioPatch instruction booklet and Button Press Log were reviewed with patient/family. It was reinforced that patient should wait to shower until 24 hours after ZioPatch application and also avoid excessive sweating while ZioPatch is in place. Patient/family provided ZioPatch kit, which they will mail back to [employer] once monitoring is complete.        Call [employer] #[phone] if:  - ZioPatch falls off  - Severe itching or irritation around ZioPatch  - ZioPatch is flashing orange (this does not mean there is a problems with your heart; it just means that the Patch is not well attached).        [facility]: #[phone]

## 2021-12-29 ASSESSMENT — PATIENT HEALTH QUESTIONNAIRE - PHQ9: SUM OF ALL RESPONSES TO PHQ QUESTIONS 1-9: 0

## 2021-12-29 ASSESSMENT — ANXIETY QUESTIONNAIRES: GAD7 TOTAL SCORE: 0

## 2022-01-27 ENCOUNTER — VIRTUAL VISIT (OUTPATIENT)
Dept: PSYCHOLOGY | Facility: CLINIC | Age: 22
End: 2022-01-27
Payer: COMMERCIAL

## 2022-01-27 DIAGNOSIS — F33.41 MAJOR DEPRESSIVE DISORDER, RECURRENT, IN PARTIAL REMISSION (H): Primary | ICD-10-CM

## 2022-01-27 PROCEDURE — 90834 PSYTX W PT 45 MINUTES: CPT | Mod: GT | Performed by: MARRIAGE & FAMILY THERAPIST

## 2022-01-27 NOTE — PROGRESS NOTES
Progress Note    Patient Name: Klebo J Skjervold David  Date: 1/27/22         Service Type: Individual      Session Start Time: 2:30  Session End Time: 3:22     Session Length: 52    Session #: 5    Attendees: Client attended alone    Service Modality:  Video Visit:      Provider verified identity through the following two step process.  Patient provided:  Patient photo and Patient was verified at admission/transfer    Telemedicine Visit: The patient's condition can be safely assessed and treated via synchronous audio and visual telemedicine encounter.      Reason for Telemedicine Visit: Services only offered telehealth    Originating Site (Patient Location): Patient's home    Distant Site (Provider Location): Provider Remote Setting- Home Office    Consent:  The patient/guardian has verbally consented to: the potential risks and benefits of telemedicine (video visit) versus in person care; bill my insurance or make self-payment for services provided; and responsibility for payment of non-covered services.     Patient would like the video invitation sent by:  My Chart    Mode of Communication:  Video Conference via [facility]    As the provider I attest to compliance with applicable laws and regulations related to telemedicine.     Treatment Plan Last Reviewed: 11/5/21, next due 2/5/22.  PHQ-9 / PETRA-7 : completed.    DATA  Interactive Complexity: No  Crisis: No       Progress Since Last Session (Related to Symptoms / Goals / Homework):   Symptoms: Improving decreased PHQ-9 score.    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  Patient reported history of doing this in high school and college, and  struggling with feeling like it is inevitable that he will continue with this pattern.     Treatment Objective(s) Addressed in This Session:   Identify negative self-talk and behaviors: challenge core beliefs, myths, and actions     Intervention:   CBT: reviewed with patient identifying and challenging his irrational beliefs about inevitably self-sabotaging, focusing on his successes.        ASSESSMENT: Current Emotional / Mental Status (status of significant symptoms):   Risk status (Self / Other harm or suicidal ideation)   Patient denies current fears or concerns for personal safety.   Patient denies current or recent suicidal ideation or behaviors.   Patient denies current or recent homicidal ideation or behaviors.   Patient denies current or recent self injurious behavior or ideation.   Patient denies other safety concerns.   Patient reports there has been no change in risk factors since their last session.     Patient reports there has been no change in protective factors since their last session.     Recommended that patient call 911 or go to the local ED should there be a change in any of these risk factors.     Appearance:   Appropriate    Eye Contact:   Good    Psychomotor Behavior: Normal    Attitude:   Cooperative  Interested Friendly Pleasant Attentive   Orientation:   All   Speech    Rate / Production: Normal/ Responsive    Volume:  Normal    Mood:    Anxious  Depressed  Normal   Affect:    Appropriate  Subdued    Thought Content:  Clear  Rumination    Thought Form:  Coherent  Logical    Insight:    Good  and Intellectual Insight     Medication Review:   No changes to current psychiatric medication(s)     Medication Compliance:   Yes     Changes in Health Issues:   None reported     Chemical Use Review:   Substance Use: Chemical use reviewed, no active concerns identified      Tobacco Use: No current tobacco use.      Diagnosis:  1. Major depressive disorder, recurrent, in partial remission (H)         Collateral Reports Completed:   Not Applicable    PLAN: (Patient Tasks / Therapist Tasks / Other)  Patient agreed to work on identifying and challenging his irrational beliefs about inevitably self-sabotaging, focusing on his successes.    Chetan Juárez Little, LMFT 1/27/22                                                         ______________________________________________________________________    Treatment Plan    Patient's Name: Klebo J Skjervold David  YOB: 2000    Date: 11/5/21    DSM5 Diagnoses: 296.35 (F33.41)  Major Depressive Disorder, Recurrent Episode, In partial remission _  Psychosocial / Contextual Factors: .  Cultural influences and impact on patient's life structure, values, norms, and healthcare: very in volved in [place].  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  WHODAS: 12    Referral / Collaboration:  Referral to another professional/service is not indicated at this time..    Anticipated number of session or this episode of care: 11-20      MeasurableTreatment Goal(s) related to diagnosis / functional impairment(s)  Goal 1: Patient will improve overall baseline mood by 2 points on a 1-10 Likert scale per self-report (10 = optimal mood).    I will know I've met my goal when I feel better/less depressed overall and have improved my life structure/organization.      Objective #A (Client Action)    Status: New - Date:  11/5/21    Patient will Identify negative self-talk and behaviors: challenge core beliefs, myths, and actions.    Intervention(s)  Therapist will teach emotional regulation skills. CBT/REBT ABCD model.    Objective #B  Patient will Increase interest, engagement, and pleasure in doing things  Decrease frequency and intensity of  feeling down, depressed, hopeless  Improve concentration, focus, and mindfulness in daily activities .    Status: New - Date:  11/5/21    Intervention(s)  Therapist will teach emotional regulation skills. DBT Core Mindfulness, Distress Tolerance, Emotion Regulation, and Interpersonal Effetiveness skills.    Patient has reviewed and agreed to the above plan.      Chetan Fitch, LMFT  November 5, 2021

## 2022-01-28 ASSESSMENT — PATIENT HEALTH QUESTIONNAIRE - PHQ9: SUM OF ALL RESPONSES TO PHQ QUESTIONS 1-9: 0

## 2022-02-04 ENCOUNTER — VIRTUAL VISIT (OUTPATIENT)
Dept: PSYCHOLOGY | Facility: CLINIC | Age: 22
End: 2022-02-04
Payer: COMMERCIAL

## 2022-02-04 DIAGNOSIS — F33.41 MAJOR DEPRESSIVE DISORDER, RECURRENT, IN PARTIAL REMISSION (H): Primary | ICD-10-CM

## 2022-02-04 PROCEDURE — 90834 PSYTX W PT 45 MINUTES: CPT | Mod: GT | Performed by: MARRIAGE & FAMILY THERAPIST

## 2022-02-04 NOTE — PROGRESS NOTES
Progress Note    Patient Name: Klebo J Skjervold David  Date: 2/4/22         Service Type: Individual      Session Start Time: 2:02  Session End Time: 2:50     Session Length: 48    Session #: 6    Attendees: Client attended alone    Service Modality:  Video Visit:      Provider verified identity through the following two step process.  Patient provided:  Patient photo and Patient was verified at admission/transfer    Telemedicine Visit: The patient's condition can be safely assessed and treated via synchronous audio and visual telemedicine encounter.      Reason for Telemedicine Visit: Services only offered telehealth    Originating Site (Patient Location): Patient's home    Distant Site (Provider Location): Provider Remote Setting- Home Office    Consent:  The patient/guardian has verbally consented to: the potential risks and benefits of telemedicine (video visit) versus in person care; bill my insurance or make self-payment for services provided; and responsibility for payment of non-covered services.     Patient would like the video invitation sent by:  My Chart    Mode of Communication:  Video Conference via [facility]    As the provider I attest to compliance with applicable laws and regulations related to telemedicine.     Treatment Plan Last Reviewed: 11/5/21, next due 2/5/22.  PHQ-9 / PETRA-7 : completed.    DATA  Interactive Complexity: No  Crisis: No       Progress Since Last Session (Related to Symptoms / Goals / Homework):   Symptoms: minimal anxiety.    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  Patient reported history of doing this in high school and college, and that he  does not feel much different now from then, nor any more enthusiastic about his new career direction than he was about previous ideas..     Treatment Objective(s) Addressed in This Session:   Identify negative self-talk and behaviors: challenge core beliefs, myths, and actions     Intervention:   CBT: reviewed with patient identifying/challenging/reframing his self-concept based on past as changing with age/wisdom.        ASSESSMENT: Current Emotional / Mental Status (status of significant symptoms):   Risk status (Self / Other harm or suicidal ideation)   Patient denies current fears or concerns for personal safety.   Patient denies current or recent suicidal ideation or behaviors.   Patient denies current or recent homicidal ideation or behaviors.   Patient denies current or recent self injurious behavior or ideation.   Patient denies other safety concerns.   Patient reports there has been no change in risk factors since their last session.     Patient reports there has been no change in protective factors since their last session.     Recommended that patient call 911 or go to the local ED should there be a change in any of these risk factors.     Appearance:   Appropriate    Eye Contact:   Good    Psychomotor Behavior: Normal    Attitude:   Cooperative  Interested Friendly Pleasant Attentive   Orientation:   All   Speech    Rate / Production: Normal/ Responsive    Volume:  Normal    Mood:    Anxious  Depressed  Normal   Affect:    Appropriate  Subdued    Thought Content:  Clear  Rumination    Thought Form:  Coherent  Logical    Insight:    Good  and Intellectual Insight     Medication Review:   No changes to current psychiatric medication(s)     Medication Compliance:   Yes     Changes in Health Issues:   None reported     Chemical Use Review:   Substance Use: Chemical use reviewed, no active concerns identified      Tobacco Use: No current tobacco use.      Diagnosis:  1. Major depressive disorder, recurrent,  in partial remission (H)        Collateral Reports Completed:   Not Applicable    PLAN: (Patient Tasks / Therapist Tasks / Other)  Patient agreed to work on identifying/challenging/reframing his self-concept based on past as changing with age/wisdom.    Chetan Franck Fitch, LMFT 2/4/22                                                         ______________________________________________________________________    Treatment Plan    Patient's Name: Klebo J Skjervold David  YOB: 2000    Date: 11/5/21    DSM5 Diagnoses: 296.35 (F33.41)  Major Depressive Disorder, Recurrent Episode, In partial remission _  Psychosocial / Contextual Factors: .  Cultural influences and impact on patient's life structure, values, norms, and healthcare: very in volved in [place].  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  WHODAS: 12    Referral / Collaboration:  Referral to another professional/service is not indicated at this time..    Anticipated number of session or this episode of care: 11-20      MeasurableTreatment Goal(s) related to diagnosis / functional impairment(s)  Goal 1: Patient will improve overall baseline mood by 2 points on a 1-10 Likert scale per self-report (10 = optimal mood).    I will know I've met my goal when I feel better/less depressed overall and have improved my life structure/organization.      Objective #A (Client Action)    Status: New - Date:  11/5/21    Patient will Identify negative self-talk and behaviors: challenge core beliefs, myths, and actions.    Intervention(s)  Therapist will teach emotional regulation skills. CBT/REBT ABCD model.    Objective #B  Patient will Increase interest, engagement, and pleasure in doing things  Decrease frequency and intensity  of feeling down, depressed, hopeless  Improve concentration, focus, and mindfulness in daily activities .    Status: New - Date:  11/5/21    Intervention(s)  Therapist will teach emotional regulation skills. DBT Core Mindfulness, Distress Tolerance, Emotion Regulation, and Interpersonal Effetiveness skills.    Patient has reviewed and agreed to the above plan.      Chetan Fitch, LMFT  November 5, 2021

## 2022-02-24 ENCOUNTER — OFFICE VISIT (OUTPATIENT)
Dept: PSYCHOLOGY | Facility: CLINIC | Age: 22
End: 2022-02-24
Payer: COMMERCIAL

## 2022-02-24 DIAGNOSIS — F33.41 MAJOR DEPRESSIVE DISORDER, RECURRENT, IN PARTIAL REMISSION (H): Primary | ICD-10-CM

## 2022-02-24 PROCEDURE — 90834 PSYTX W PT 45 MINUTES: CPT | Performed by: MARRIAGE & FAMILY THERAPIST

## 2022-02-24 ASSESSMENT — ANXIETY QUESTIONNAIRES
3. WORRYING TOO MUCH ABOUT DIFFERENT THINGS: NOT AT ALL
2. NOT BEING ABLE TO STOP OR CONTROL WORRYING: NOT AT ALL
7. FEELING AFRAID AS IF SOMETHING AWFUL MIGHT HAPPEN: NOT AT ALL
6. BECOMING EASILY ANNOYED OR IRRITABLE: NOT AT ALL
4. TROUBLE RELAXING: NOT AT ALL
GAD7 TOTAL SCORE: 0
1. FEELING NERVOUS, ANXIOUS, OR ON EDGE: NOT AT ALL
GAD7 TOTAL SCORE: 0
GAD7 TOTAL SCORE: 0
5. BEING SO RESTLESS THAT IT IS HARD TO SIT STILL: NOT AT ALL
7. FEELING AFRAID AS IF SOMETHING AWFUL MIGHT HAPPEN: NOT AT ALL

## 2022-02-24 ASSESSMENT — PATIENT HEALTH QUESTIONNAIRE - PHQ9
10. IF YOU CHECKED OFF ANY PROBLEMS, HOW DIFFICULT HAVE THESE PROBLEMS MADE IT FOR YOU TO DO YOUR WORK, TAKE CARE OF THINGS AT HOME, OR GET ALONG WITH OTHER PEOPLE: NOT DIFFICULT AT ALL
SUM OF ALL RESPONSES TO PHQ QUESTIONS 1-9: 0
SUM OF ALL RESPONSES TO PHQ QUESTIONS 1-9: 0

## 2022-02-24 NOTE — PROGRESS NOTES
Progress Note    Patient Name: Klebo J Skjervold David  Date: 2/24/22         Service Type: Individual      Session Start Time: 10:30  Session End Time: 11:20     Session Length: 48    Session #: 7    Attendees: Client attended alone    Service Modality:  In-Person Visit:     Treatment Plan Last Reviewed: 2/24/22, next due 5/24/22.  PHQ-9 / PETRA-7 : completed.    DATA  Interactive Complexity: No  Crisis: No       Progress Since Last Session (Related to Symptoms / Goals / Homework):   Symptoms: minimal depression/anxiety.    Homework: Partially completed      Episode of Care Goals: Satisfactory progress - ACTION (Actively working towards change); Intervened by reinforcing change plan / affirming steps taken     Current / Ongoing Stressors and Concerns:   Patient reported experiencing minimal depression and anxiety lately related to current and past interpersonal stressors.  Patient reported regarding recent interaction with fellow students in which he was left feeling used.  Patient reported history of having fallings-out with friends he trusted, leading him to question his boundaries.     Treatment Objective(s) Addressed in This Session:   Identify negative self-talk and behaviors: challenge core beliefs, myths, and actions  compile a list of boundaries that they would like to set with others. friends   Patient identified his desired continued therapy goals.     Intervention:   CBT: reviewed with patient challenging his self-downing based on relationship difficulties.  Interpersonal Therapy: reviewed with patient boundary-setting with friends.  Motivational Interviewing: reviewed with patient his desired continued therapy goals.        ASSESSMENT: Current Emotional / Mental Status (status of significant symptoms):   Risk status (Self / Other harm or suicidal ideation)   Patient denies current fears or concerns for personal safety.   Patient denies current or recent suicidal  ideation or behaviors.   Patient denies current or recent homicidal ideation or behaviors.   Patient denies current or recent self injurious behavior or ideation.   Patient denies other safety concerns.   Patient reports there has been no change in risk factors since their last session.     Patient reports there has been no change in protective factors since their last session.     Recommended that patient call 911 or go to the local ED should there be a change in any of these risk factors.     Appearance:   Appropriate    Eye Contact:   Good    Psychomotor Behavior: Normal    Attitude:   Cooperative  Interested Friendly Pleasant Attentive   Orientation:   All   Speech    Rate / Production: Normal/ Responsive Talkative    Volume:  Normal    Mood:    Anxious  Depressed  Normal   Affect:    Appropriate  Subdued    Thought Content:  Clear  Rumination    Thought Form:  Coherent  Logical    Insight:    Good  and Intellectual Insight     Medication Review:   No changes to current psychiatric medication(s)     Medication Compliance:   Yes     Changes in Health Issues:   None reported     Chemical Use Review:   Substance Use: Chemical use reviewed, no active concerns identified      Tobacco Use: No current tobacco use.      Diagnosis:  1. Major depressive disorder, recurrent, in partial remission (H)        Collateral Reports Completed:   Not Applicable    PLAN: (Patient Tasks / Therapist Tasks / Other)  Patient agreed to work on challenging his self-downing based on relationship difficulties and boundary-setting with friends.      Chetan Fitch, LMFT 2/24/22                                                         ______________________________________________________________________    Treatment Plan    Patient's Name: Klebo J Skjervold David  YOB: 2000    Date: 2/24/22    DSM5 Diagnoses: 296.35 (F33.41)  Major Depressive Disorder, Recurrent Episode, In partial remission _  Psychosocial / Contextual  Factors: .  Cultural influences and impact on patient's life structure, values, norms, and healthcare: very in volved in [place].  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  WHODAS: 10    Referral / Collaboration:  Referral to another professional/service is not indicated at this time..    Anticipated number of session or this episode of care: 11-20      MeasurableTreatment Goal(s) related to diagnosis / functional impairment(s)  Goal 1: Patient will improve overall baseline mood by 2 points on a 1-10 Likert scale per self-report (10 = optimal mood).    I will know I've met my goal when I feel better/less depressed overall and have improved my life structure/organization.      Objective #A (Client Action)    Status: Continued - Date:  2/24/22    Patient will Identify negative self-talk and behaviors: challenge core beliefs, myths, and actions.    Intervention(s)  Therapist will teach emotional regulation skills. CBT/REBT ABCD model.    Objective #B  Patient will Increase interest, engagement, and pleasure in doing things  Decrease frequency and intensity of feeling down, depressed, hopeless  Improve concentration, focus, and mindfulness in daily activities .    Status: Continued - Date:  2/24/22    Intervention(s)  Therapist will teach emotional regulation skills. DBT Core Mindfulness, Distress Tolerance, Emotion Regulation, and Interpersonal Effetiveness skills.    Patient has reviewed and agreed to the above plan.      Chetan Fitch, LMFT  February 24, 2022

## 2022-02-25 ASSESSMENT — ANXIETY QUESTIONNAIRES: GAD7 TOTAL SCORE: 0

## 2022-02-25 ASSESSMENT — PATIENT HEALTH QUESTIONNAIRE - PHQ9: SUM OF ALL RESPONSES TO PHQ QUESTIONS 1-9: 0

## 2022-03-11 ENCOUNTER — VIRTUAL VISIT (OUTPATIENT)
Dept: PSYCHOLOGY | Facility: CLINIC | Age: 22
End: 2022-03-11
Payer: COMMERCIAL

## 2022-03-11 DIAGNOSIS — F33.41 MAJOR DEPRESSIVE DISORDER, RECURRENT, IN PARTIAL REMISSION (H): Primary | ICD-10-CM

## 2022-03-11 PROCEDURE — 90834 PSYTX W PT 45 MINUTES: CPT | Mod: GT | Performed by: MARRIAGE & FAMILY THERAPIST

## 2022-03-11 ASSESSMENT — PATIENT HEALTH QUESTIONNAIRE - PHQ9
SUM OF ALL RESPONSES TO PHQ QUESTIONS 1-9: 0
SUM OF ALL RESPONSES TO PHQ QUESTIONS 1-9: 0
10. IF YOU CHECKED OFF ANY PROBLEMS, HOW DIFFICULT HAVE THESE PROBLEMS MADE IT FOR YOU TO DO YOUR WORK, TAKE CARE OF THINGS AT HOME, OR GET ALONG WITH OTHER PEOPLE: NOT DIFFICULT AT ALL

## 2022-03-11 ASSESSMENT — ANXIETY QUESTIONNAIRES
4. TROUBLE RELAXING: NOT AT ALL
3. WORRYING TOO MUCH ABOUT DIFFERENT THINGS: NOT AT ALL
7. FEELING AFRAID AS IF SOMETHING AWFUL MIGHT HAPPEN: NOT AT ALL
6. BECOMING EASILY ANNOYED OR IRRITABLE: NOT AT ALL
GAD7 TOTAL SCORE: 0
2. NOT BEING ABLE TO STOP OR CONTROL WORRYING: NOT AT ALL
GAD7 TOTAL SCORE: 0
GAD7 TOTAL SCORE: 0
7. FEELING AFRAID AS IF SOMETHING AWFUL MIGHT HAPPEN: NOT AT ALL
5. BEING SO RESTLESS THAT IT IS HARD TO SIT STILL: NOT AT ALL
1. FEELING NERVOUS, ANXIOUS, OR ON EDGE: NOT AT ALL

## 2022-03-11 NOTE — PROGRESS NOTES
[facility]                                     Progress Note    Patient Name: Klebo J Skjervold David  Date: 3/112         Service Type: Individual      Session Start Time: 1:00  Session End Time: 1:45     Session Length: 45    Session #: 8    Attendees: Client attended alone    Service Modality:  Video Visit:      Provider verified identity through the following two step process.  Patient provided:  Patient is known previously to provider and Patient was verified at admission/transfer    Telemedicine Visit: The patient's condition can be safely assessed and treated via synchronous audio and visual telemedicine encounter.      Reason for Telemedicine Visit: Services only offered telehealth    Originating Site (Patient Location): Patient's home    Distant Site (Provider Location): Provider Remote Setting- Home Office    Consent:  The patient/guardian has verbally consented to: the potential risks and benefits of telemedicine (video visit) versus in person care; bill my insurance or make self-payment for services provided; and responsibility for payment of non-covered services.     Patient would like the video invitation sent by:  My Chart    Mode of Communication:  Video Conference via [employer]    As the provider I attest to compliance with applicable laws and regulations related to telemedicine.    DATA  Interactive Complexity: No  Crisis: No        Progress Since Last Session (Related to Symptoms / Goals / Homework):   Symptoms: No change stable    Homework: Partially completed      Episode of Care Goals: Satisfactory progress - ACTION (Actively working towards change); Intervened by reinforcing change plan / affirming steps taken     Current / Ongoing Stressors and Concerns:   Patient reported continuing to experience minimal depression and anxiety related to life/interpersonal stressors.  Patient reported attending therapy session with his mother regarding boundaries, and struggling with how  "to balance others' requests of him with his own needs without being selfish or a \"fair weather friend.\"     Treatment Objective(s) Addressed in This Session:   compile a list of boundaries that they would like to set with others.       Intervention:   Interpersonal Therapy: reviewed with patient boundary-setting, focusing on prioritizing self-care first.    Assessments completed prior to visit:  The following assessments were completed by patient for this visit:  PHQ9:   PHQ-9 SCORE 11/5/2021 11/19/2021 12/10/2021 12/28/2021 1/27/2022 2/24/2022 3/11/2022   PHQ-9 Total Score - - - - - - -   PHQ-9 Total Score MyChart 4 (Minimal depression) 2 (Minimal depression) 2 (Minimal depression) 0 0 0 0   PHQ-9 Total Score 4 2 2 0 0 0 0     GAD7:   PETRA-7 SCORE 10/12/2021 11/5/2021 11/19/2021 12/10/2021 12/28/2021 2/24/2022 3/11/2022   Total Score 2 (minimal anxiety) 3 (minimal anxiety) 0 (minimal anxiety) 0 (minimal anxiety) 0 (minimal anxiety) 0 (minimal anxiety) 0 (minimal anxiety)   Total Score 2 3 0 0 0 0 0     PROMIS 10-Global Health (all questions and answers displayed):   PROMIS 10 12/10/2021 3/11/2022   In general, would you say your health is: Good Very good   In general, would you say your quality of life is: Good Very good   In general, how would you rate your physical health? Fair Good   In general, how would you rate your mental health, including your mood and your ability to think? Fair Good   In general, how would you rate your satisfaction with your social activities and relationships? Good Good   In general, please rate how well you carry out your usual social activities and roles Good Very good   To what extent are you able to carry out your everyday physical activities such as walking, climbing stairs, carrying groceries, or moving a chair? Completely Completely   How often have you been bothered by emotional problems such as feeling anxious, depressed or irritable? Never Rarely   How would you rate your " fatigue on average? Mild Mild   How would you rate your pain on average?   0 = No Pain  to  10 = Worst Imaginable Pain 0 0   In general, would you say your health is: 3 4   In general, would you say your quality of life is: 3 4   In general, how would you rate your physical health? 2 3   In general, how would you rate your mental health, including your mood and your ability to think? 2 3   In general, how would you rate your satisfaction with your social activities and relationships? 3 3   In general, please rate how well you carry out your usual social activities and roles. (This includes activities at home, at work and in your community, and responsibilities as a parent, child, spouse, employee, friend, etc.) 3 4   To what extent are you able to carry out your everyday physical activities such as walking, climbing stairs, carrying groceries, or moving a chair? 5 5   In the past 7 days, how often have you been bothered by emotional problems such as feeling anxious, depressed, or irritable? 1 2   In the past 7 days, how would you rate your fatigue on average? 2 2   In the past 7 days, how would you rate your pain on average, where 0 means no pain, and 10 means worst imaginable pain? 0 0   Global Mental Health Score 13 14   Global Physical Health Score 16 17   PROMIS TOTAL - SUBSCORES 29 31   Some recent data might be hidden         ASSESSMENT: Current Emotional / Mental Status (status of significant symptoms):   Risk status (Self / Other harm or suicidal ideation)   Patient denies current fears or concerns for personal safety.   Patient denies current or recent suicidal ideation or behaviors.   Patient denies current or recent homicidal ideation or behaviors.   Patient denies current or recent self injurious behavior or ideation.   Patient denies other safety concerns.   Patient reports there has been no change in risk factors since their last session.     Patient reports there has been no change in protective  factors since their last session.     Recommended that patient call 911 or go to the local ED should there be a change in any of these risk factors.     Appearance:   Appropriate    Eye Contact:   Good    Psychomotor Behavior: Normal    Attitude:   Cooperative  Interested Friendly Pleasant Attentive   Orientation:   All   Speech    Rate / Production: Talkative Normal     Volume:  Normal    Mood:    Anxious  Normal   Affect:    Appropriate  Subdued  Worrisome    Thought Content:  Clear  Rumination    Thought Form:  Coherent  Logical    Insight:    Good  and Intellectual Insight     Medication Review:   No changes to current psychiatric medication(s)     Medication Compliance:   Yes     Changes in Health Issues:   None reported     Chemical Use Review:   Substance Use: Chemical use reviewed, no active concerns identified      Tobacco Use: No current tobacco use.      Diagnosis:  1. Major depressive disorder, recurrent, in partial remission (H)        Collateral Reports Completed:   Not Applicable    PLAN: (Patient Tasks / Therapist Tasks / Other)  Patient agreed to work on boundary-setting, focusing on prioritizing self-care first.        Chetan Fitch, TYLER 3/11/22                                                         ______________________________________________________________________    Individual Treatment Plan    Patient's Name: Klebo J Skjervold David  YOB: 2000    Date of Creation: 2/24/22  Date Treatment Plan Last Reviewed/Revised: 2/24/22, next due 5/24/22    DSM5 Diagnoses: 296.35 (F33.41)  Major Depressive Disorder, Recurrent Episode, In partial remission _  Psychosocial / Contextual Factors: .  Cultural influences and impact on patient's life structure, values, norms, and healthcare: very in volved in [place].  Contextual influences on patient's health include: Individual Factors patient has Stickler's Syndrome/hearing loss/heart blockage and Family Factors grandfather  diagnosed with cancer when patient was 10, lived with him for support for six months before his death.  PROMIS (reviewed every 90 days): 31    Referral / Collaboration:  Referral to another professional/service is not indicated at this time..    Anticipated number of session for this episode of care: 11-20  Anticipation frequency of session: Weekly  Anticipated Duration of each session: 38-52 minutes  Treatment plan will be reviewed in 90 days or when goals have been changed.       MeasurableTreatment Goal(s) related to diagnosis / functional impairment(s)  Goal 1: Patient will improve overall baseline mood by 2 points on a 1-10 Likert scale per self-report (10 = optimal mood).    I will know I've met my goal when I feel better/less depressed overall and have improved my life structure/organization.       Objective #A (Client Action)                Status: Continued - Date:  2/24/22     Patient will Identify negative self-talk and behaviors: challenge core beliefs, myths, and actions.     Intervention(s)  Therapist will teach emotional regulation skills. CBT/REBT ABCD model.     Objective #B  Patient will Increase interest, engagement, and pleasure in doing things  Decrease frequency and intensity of feeling down, depressed, hopeless  Improve concentration, focus, and mindfulness in daily activities .                 Status: Continued - Date:  2/24/22     Intervention(s)  Therapist will teach emotional regulation skills. DBT Core Mindfulness, Distress Tolerance, Emotion Regulation, and Interpersonal Effetiveness skills.     Patient has reviewed and agreed to the above plan.      Chetan Fitch, LMFT  February 24, 2022

## 2022-03-12 ASSESSMENT — PATIENT HEALTH QUESTIONNAIRE - PHQ9: SUM OF ALL RESPONSES TO PHQ QUESTIONS 1-9: 0

## 2022-03-12 ASSESSMENT — ANXIETY QUESTIONNAIRES: GAD7 TOTAL SCORE: 0

## 2022-03-22 ENCOUNTER — VIRTUAL VISIT (OUTPATIENT)
Dept: PSYCHOLOGY | Facility: CLINIC | Age: 22
End: 2022-03-22
Payer: COMMERCIAL

## 2022-03-22 DIAGNOSIS — F33.41 MAJOR DEPRESSIVE DISORDER, RECURRENT, IN PARTIAL REMISSION (H): Primary | ICD-10-CM

## 2022-03-22 PROCEDURE — 90834 PSYTX W PT 45 MINUTES: CPT | Mod: GT | Performed by: MARRIAGE & FAMILY THERAPIST

## 2022-03-22 NOTE — PROGRESS NOTES
[facility]                                     Progress Note    Patient Name: Klebo J Skjervold David  Date: 3/22/22         Service Type: Individual      Session Start Time: 12:01  Session End Time: 12:45     Session Length: 44    Session #: 9    Attendees: Client attended alone    Service Modality:  Video Visit:      Provider verified identity through the following two step process.  Patient provided:  Patient is known previously to provider and Patient was verified at admission/transfer    Telemedicine Visit: The patient's condition can be safely assessed and treated via synchronous audio and visual telemedicine encounter.      Reason for Telemedicine Visit: Services only offered telehealth    Originating Site (Patient Location): Patient's home    Distant Site (Provider Location): Provider Remote Setting- Home Office    Consent:  The patient/guardian has verbally consented to: the potential risks and benefits of telemedicine (video visit) versus in person care; bill my insurance or make self-payment for services provided; and responsibility for payment of non-covered services.     Patient would like the video invitation sent by:  My Chart    Mode of Communication:  Video Conference via Amwell    As the provider I attest to compliance with applicable laws and regulations related to telemedicine.    DATA  Interactive Complexity: No  Crisis: No        Progress Since Last Session (Related to Symptoms / Goals / Homework):   Symptoms: No change stable    Homework: Partially completed      Episode of Care Goals: Satisfactory progress - ACTION (Actively working towards change); Intervened by reinforcing change plan / affirming steps taken     Current / Ongoing Stressors and Concerns:   Patient reported continuing to experience minimal depression and anxiety related to life stressors.  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     Treatment Objective(s) Addressed in This Session:   use cognitive strategies identified in therapy to challenge anxious thoughts     Intervention:   CBT: reviewed with patient focusing on doing what he can/control and de-focusing from future things outside of his control.    Assessments completed prior to visit:  The following assessments were completed by patient for this visit:  PHQ9:   PHQ-9 SCORE 11/19/2021 12/10/2021 12/28/2021 1/27/2022 2/24/2022 3/11/2022 3/22/2022   PHQ-9 Total Score - - - - - - -   PHQ-9 Total Score MyChart 2 (Minimal depression) 2 (Minimal depression) 0 0 0 0 0   PHQ-9 Total Score 2 2 0 0 0 0 0     GAD7:   PETRA-7 SCORE 10/12/2021 11/5/2021 11/19/2021 12/10/2021 12/28/2021 2/24/2022 3/11/2022   Total Score 2 (minimal anxiety) 3 (minimal anxiety) 0 (minimal anxiety) 0 (minimal anxiety) 0 (minimal anxiety) 0 (minimal anxiety) 0 (minimal anxiety)   Total Score 2 3 0 0 0 0 0     PROMIS 10-Global Health (all questions and answers displayed):   PROMIS 10 12/10/2021 3/11/2022 3/22/2022   In general, would you say your health is: Good Very good Good   In general, would you say your quality of life is: Good Very good Very good   In general, how would you rate your physical health? Fair Good Good   In general, how would you rate your mental health, including your mood and your ability to think? Fair Good Good   In general, how would you rate your satisfaction with your social activities and relationships? Good Good Good   In general, please rate how well you carry out your usual social activities and roles Good Very good Very good   To what extent are you able to carry out your everyday physical activities such as walking, climbing stairs, carrying groceries, or moving a chair? Completely Completely Completely   How often have you  been bothered by emotional problems such as feeling anxious, depressed or irritable? Never Rarely Rarely   How would you rate your fatigue on average? Mild Mild Mild   How would you rate your pain on average?   0 = No Pain  to  10 = Worst Imaginable Pain 0 0 0   In general, would you say your health is: 3 4 3   In general, would you say your quality of life is: 3 4 4   In general, how would you rate your physical health? 2 3 3   In general, how would you rate your mental health, including your mood and your ability to think? 2 3 3   In general, how would you rate your satisfaction with your social activities and relationships? 3 3 3   In general, please rate how well you carry out your usual social activities and roles. (This includes activities at home, at work and in your community, and responsibilities as a parent, child, spouse, employee, friend, etc.) 3 4 4   To what extent are you able to carry out your everyday physical activities such as walking, climbing stairs, carrying groceries, or moving a chair? 5 5 5   In the past 7 days, how often have you been bothered by emotional problems such as feeling anxious, depressed, or irritable? 1 2 2   In the past 7 days, how would you rate your fatigue on average? 2 2 2   In the past 7 days, how would you rate your pain on average, where 0 means no pain, and 10 means worst imaginable pain? 0 0 0   Global Mental Health Score 13 14 14   Global Physical Health Score 16 17 17   PROMIS TOTAL - SUBSCORES 29 31 31   Some recent data might be hidden         ASSESSMENT: Current Emotional / Mental Status (status of significant symptoms):   Risk status (Self / Other harm or suicidal ideation)   Patient denies current fears or concerns for personal safety.   Patient denies current or recent suicidal ideation or behaviors.   Patient denies current or recent homicidal ideation or behaviors.   Patient denies current or recent self injurious behavior or ideation.   Patient denies other  safety concerns.   Patient reports there has been no change in risk factors since their last session.     Patient reports there has been no change in protective factors since their last session.     Recommended that patient call 911 or go to the local ED should there be a change in any of these risk factors.     Appearance:   Appropriate    Eye Contact:   Good    Psychomotor Behavior: Normal    Attitude:   Cooperative  Interested Friendly Pleasant Attentive   Orientation:   All   Speech    Rate / Production: Talkative Normal     Volume:  Normal    Mood:    Anxious  Normal   Affect:    Appropriate  Subdued  Worrisome    Thought Content:  Clear  Rumination    Thought Form:  Coherent  Logical    Insight:    Good      Medication Review:   No changes to current psychiatric medication(s)     Medication Compliance:   Yes     Changes in Health Issues:   None reported     Chemical Use Review:   Substance Use: Chemical use reviewed, no active concerns identified      Tobacco Use: No current tobacco use.      Diagnosis:  1. Major depressive disorder, recurrent, in partial remission (H)        Collateral Reports Completed:   Not Applicable    PLAN: (Patient Tasks / Therapist Tasks / Other)  Patient agreed to work on focusing on doing what he can/control and de-focusing from future things outside of his control.        Chetan Fitch, LMFT 3/22/22                                                         ______________________________________________________________________    Individual Treatment Plan    Patient's Name: Klebo J Skjervold David  YOB: 2000    Date of Creation: 2/24/22  Date Treatment Plan Last Reviewed/Revised: 2/24/22, next due 5/24/22    DSM5 Diagnoses: 296.35 (F33.41)  Major Depressive Disorder, Recurrent Episode, In partial remission _  Psychosocial / Contextual Factors: .  Cultural influences and impact on patient's life structure, values, norms, and healthcare: very in volved  in [place].  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  PROMIS (reviewed every 90 days): 31    Referral / Collaboration:  Referral to another professional/service is not indicated at this time..    Anticipated number of session for this episode of care: 11-20  Anticipation frequency of session: Weekly  Anticipated Duration of each session: 38-52 minutes  Treatment plan will be reviewed in 90 days or when goals have been changed.       MeasurableTreatment Goal(s) related to diagnosis / functional impairment(s)  Goal 1: Patient will improve overall baseline mood by 2 points on a 1-10 Likert scale per self-report (10 = optimal mood).    I will know I've met my goal when I feel better/less depressed overall and have improved my life structure/organization.       Objective #A (Client Action)                Status: Continued - Date:  2/24/22     Patient will Identify negative self-talk and behaviors: challenge core beliefs, myths, and actions.     Intervention(s)  Therapist will teach emotional regulation skills. CBT/REBT ABCD model.     Objective #B  Patient will Increase interest, engagement, and pleasure in doing things  Decrease frequency and intensity of feeling down, depressed, hopeless  Improve concentration, focus, and mindfulness in daily activities .                 Status: Continued - Date:  2/24/22     Intervention(s)  Therapist will teach emotional regulation skills. DBT Core Mindfulness, Distress Tolerance, Emotion Regulation, and Interpersonal Effetiveness skills.     Patient has reviewed and agreed to the above plan.      Chetan Fitch, LMFT  February 24, 2022

## 2022-03-23 ASSESSMENT — PATIENT HEALTH QUESTIONNAIRE - PHQ9: SUM OF ALL RESPONSES TO PHQ QUESTIONS 1-9: 0

## 2022-04-22 ENCOUNTER — VIRTUAL VISIT (OUTPATIENT)
Dept: PSYCHOLOGY | Facility: CLINIC | Age: 22
End: 2022-04-22
Payer: COMMERCIAL

## 2022-04-22 DIAGNOSIS — F33.41 MAJOR DEPRESSIVE DISORDER, RECURRENT, IN PARTIAL REMISSION (H): Primary | ICD-10-CM

## 2022-04-22 PROCEDURE — 90834 PSYTX W PT 45 MINUTES: CPT | Mod: GT | Performed by: MARRIAGE & FAMILY THERAPIST

## 2022-04-22 NOTE — PROGRESS NOTES
[facility]                                     Progress Note    Patient Name: Klebo J Skjervold David  Date: 4/22/22         Service Type: Individual      Session Start Time: 1:00  Session End Time: 1:38     Session Length: 38    Session #: 10    Attendees: Client attended alone    Service Modality:  Video Visit:      Provider verified identity through the following two step process.  Patient provided:  Patient is known previously to provider and Patient was verified at admission/transfer    Telemedicine Visit: The patient's condition can be safely assessed and treated via synchronous audio and visual telemedicine encounter.      Reason for Telemedicine Visit: Services only offered telehealth    Originating Site (Patient Location): Patient's home    Distant Site (Provider Location): Provider Remote Setting- Home Office    Consent:  The patient/guardian has verbally consented to: the potential risks and benefits of telemedicine (video visit) versus in person care; bill my insurance or make self-payment for services provided; and responsibility for payment of non-covered services.     Patient would like the video invitation sent by:  My Chart    Mode of Communication:  Video Conference via Amwell    As the provider I attest to compliance with applicable laws and regulations related to telemedicine.    DATA  Interactive Complexity: No  Crisis: No        Progress Since Last Session (Related to Symptoms / Goals / Homework):   Symptoms: No change stable    Homework: Partially completed      Episode of Care Goals: Satisfactory progress - ACTION (Actively working towards change); Intervened by reinforcing change plan / affirming steps taken     Current / Ongoing Stressors and Concerns:   Patient reported continuing to experience minimal depression and anxiety related to life stressors.  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     Treatment Objective(s) Addressed in This Session:   use cognitive strategies identified in therapy to challenge anxious thoughts     Intervention:   CBT: reviewed with patient reframing his anxiety as normal response to first-time experiences.    Assessments completed prior to visit:  The following assessments were completed by patient for this visit:  PHQ9:   PHQ-9 SCORE 12/10/2021 12/28/2021 1/27/2022 2/24/2022 3/11/2022 3/22/2022 4/22/2022   PHQ-9 Total Score - - - - - - -   PHQ-9 Total Score MyChart 2 (Minimal depression) 0 0 0 0 0 0   PHQ-9 Total Score 2 0 0 0 0 0 0     GAD7:   PETRA-7 SCORE 10/12/2021 11/5/2021 11/19/2021 12/10/2021 12/28/2021 2/24/2022 3/11/2022   Total Score 2 (minimal anxiety) 3 (minimal anxiety) 0 (minimal anxiety) 0 (minimal anxiety) 0 (minimal anxiety) 0 (minimal anxiety) 0 (minimal anxiety)   Total Score 2 3 0 0 0 0 0     PROMIS 10-Global Health (all questions and answers displayed):   PROMIS 10 12/10/2021 3/11/2022 3/22/2022   In general, would you say your health is: Good Very good Good   In general, would you say your quality of life is: Good Very good Very good   In general, how would you rate your physical health? Fair Good Good   In general, how would you rate your mental health, including your mood and your ability to think? Fair Good Good   In general, how would you rate your satisfaction with your social activities and relationships? Good Good Good   In general, please rate how well you carry out your usual social activities and roles Good Very good Very good   To what extent are you able to carry out your everyday physical activities such as walking, climbing stairs, carrying groceries, or moving a chair? Completely Completely Completely   How often have you been bothered by emotional problems such as feeling anxious, depressed or irritable? Never Rarely Rarely   How would you rate your  fatigue on average? Mild Mild Mild   How would you rate your pain on average?   0 = No Pain  to  10 = Worst Imaginable Pain 0 0 0   In general, would you say your health is: 3 4 3   In general, would you say your quality of life is: 3 4 4   In general, how would you rate your physical health? 2 3 3   In general, how would you rate your mental health, including your mood and your ability to think? 2 3 3   In general, how would you rate your satisfaction with your social activities and relationships? 3 3 3   In general, please rate how well you carry out your usual social activities and roles. (This includes activities at home, at work and in your community, and responsibilities as a parent, child, spouse, employee, friend, etc.) 3 4 4   To what extent are you able to carry out your everyday physical activities such as walking, climbing stairs, carrying groceries, or moving a chair? 5 5 5   In the past 7 days, how often have you been bothered by emotional problems such as feeling anxious, depressed, or irritable? 1 2 2   In the past 7 days, how would you rate your fatigue on average? 2 2 2   In the past 7 days, how would you rate your pain on average, where 0 means no pain, and 10 means worst imaginable pain? 0 0 0   Global Mental Health Score 13 14 14   Global Physical Health Score 16 17 17   PROMIS TOTAL - SUBSCORES 29 31 31   Some recent data might be hidden         ASSESSMENT: Current Emotional / Mental Status (status of significant symptoms):   Risk status (Self / Other harm or suicidal ideation)   Patient denies current fears or concerns for personal safety.   Patient denies current or recent suicidal ideation or behaviors.   Patient denies current or recent homicidal ideation or behaviors.   Patient denies current or recent self injurious behavior or ideation.   Patient denies other safety concerns.   Patient reports there has been no change in risk factors since their last session.     Patient reports there  has been no change in protective factors since their last session.     Recommended that patient call 911 or go to the local ED should there be a change in any of these risk factors.     Appearance:   Appropriate    Eye Contact:   Good    Psychomotor Behavior: Normal    Attitude:   Cooperative  Interested Friendly Pleasant Attentive   Orientation:   All   Speech    Rate / Production: Talkative Normal     Volume:  Normal    Mood:    Anxious  Normal   Affect:    Appropriate  Subdued  Worrisome    Thought Content:  Clear  Rumination    Thought Form:  Coherent  Goal Directed  Logical    Insight:    Good      Medication Review:   No changes to current psychiatric medication(s)     Medication Compliance:   Yes     Changes in Health Issues:   None reported     Chemical Use Review:   Substance Use: Chemical use reviewed, no active concerns identified      Tobacco Use: No current tobacco use.      Diagnosis:  1. Major depressive disorder, recurrent, in partial remission (H)        Collateral Reports Completed:   Not Applicable    PLAN: (Patient Tasks / Therapist Tasks / Other)  Patient agreed to work on reframing his anxiety as normal response to first-time experiences and focusing on his competence/confidence.        Chetan Fitch, LMFT 4/22/22                                                         ______________________________________________________________________    Individual Treatment Plan    Patient's Name: Klebo J Skjervold David  YOB: 2000    Date of Creation: 2/24/22  Date Treatment Plan Last Reviewed/Revised: 2/24/22, next due 5/24/22    DSM5 Diagnoses: 296.35 (F33.41)  Major Depressive Disorder, Recurrent Episode, In partial remission _  Psychosocial / Contextual Factors: .  Cultural influences and impact on patient's life structure, values, norms, and healthcare: very in volved in [place].  Contextual influences on patient's health include: Individual Factors patient has  Stickler's Syndrome/hearing loss/heart blockage and Family Factors grandfather diagnosed with cancer when patient was 10, lived with him for support for six months before his death.  PROMIS (reviewed every 90 days): 31    Referral / Collaboration:  Referral to another professional/service is not indicated at this time..    Anticipated number of session for this episode of care: 11-20  Anticipation frequency of session: Weekly  Anticipated Duration of each session: 38-52 minutes  Treatment plan will be reviewed in 90 days or when goals have been changed.       MeasurableTreatment Goal(s) related to diagnosis / functional impairment(s)  Goal 1: Patient will improve overall baseline mood by 2 points on a 1-10 Likert scale per self-report (10 = optimal mood).    I will know I've met my goal when I feel better/less depressed overall and have improved my life structure/organization.       Objective #A (Client Action)                Status: Continued - Date:  2/24/22     Patient will Identify negative self-talk and behaviors: challenge core beliefs, myths, and actions.     Intervention(s)  Therapist will teach emotional regulation skills. CBT/REBT ABCD model.     Objective #B  Patient will Increase interest, engagement, and pleasure in doing things  Decrease frequency and intensity of feeling down, depressed, hopeless  Improve concentration, focus, and mindfulness in daily activities .                 Status: Continued - Date:  2/24/22     Intervention(s)  Therapist will teach emotional regulation skills. DBT Core Mindfulness, Distress Tolerance, Emotion Regulation, and Interpersonal Effetiveness skills.     Patient has reviewed and agreed to the above plan.      Chetan Fitch, LMFT  February 24, 2022

## 2022-04-23 ASSESSMENT — PATIENT HEALTH QUESTIONNAIRE - PHQ9: SUM OF ALL RESPONSES TO PHQ QUESTIONS 1-9: 0

## 2022-05-13 ENCOUNTER — VIRTUAL VISIT (OUTPATIENT)
Dept: PSYCHOLOGY | Facility: CLINIC | Age: 22
End: 2022-05-13
Payer: COMMERCIAL

## 2022-05-13 DIAGNOSIS — F33.41 MAJOR DEPRESSIVE DISORDER, RECURRENT, IN PARTIAL REMISSION (H): Primary | ICD-10-CM

## 2022-05-13 PROCEDURE — 90832 PSYTX W PT 30 MINUTES: CPT | Mod: GT | Performed by: MARRIAGE & FAMILY THERAPIST

## 2022-05-13 NOTE — PROGRESS NOTES
[facility]                                     Progress Note    Patient Name: Klebo J Skjervold David  Date: 5/13/22         Service Type: Individual      Session Start Time: 1:00  Session End Time: 1:23     Session Length: 23    Session #: 11    Attendees: Client attended alone    Service Modality:  Video Visit:      Provider verified identity through the following two step process.  Patient provided:  Patient is known previously to provider and Patient was verified at admission/transfer    Telemedicine Visit: The patient's condition can be safely assessed and treated via synchronous audio and visual telemedicine encounter.      Reason for Telemedicine Visit: Services only offered telehealth    Originating Site (Patient Location): Patient's home    Distant Site (Provider Location): Provider Remote Setting- Home Office    Consent:  The patient/guardian has verbally consented to: the potential risks and benefits of telemedicine (video visit) versus in person care; bill my insurance or make self-payment for services provided; and responsibility for payment of non-covered services.     Patient would like the video invitation sent by:  My Chart    Mode of Communication:  Video Conference via Amwell    As the provider I attest to compliance with applicable laws and regulations related to telemedicine.    DATA  Interactive Complexity: No  Crisis: No        Progress Since Last Session (Related to Symptoms / Goals / Homework):   Symptoms: Improving mood/outlook.    Homework: Achieved / completed to satisfaction      Episode of Care Goals: Satisfactory progress - ACTION (Actively working towards change); Intervened by reinforcing change plan / affirming steps taken     Current / Ongoing Stressors and Concerns:   Patient reported continuing to experience minimal ongoing depression and anxiety lately.  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  Patient identified no major concerns today, so opted for short session and scheduling out.     Treatment Objective(s) Addressed in This Session:   use at least some coping skills for anxiety management in the next few weeks     Intervention:   DBT: reviewed with patient being effective in his responses to stressors, focusing on doing what the situation calls for/what needs to be done.    Assessments completed prior to visit:  The following assessments were completed by patient for this visit:  PHQ9:   PHQ-9 SCORE 12/10/2021 12/28/2021 1/27/2022 2/24/2022 3/11/2022 3/22/2022 4/22/2022   PHQ-9 Total Score - - - - - - -   PHQ-9 Total Score MyChart 2 (Minimal depression) 0 0 0 0 0 0   PHQ-9 Total Score 2 0 0 0 0 0 0     GAD7:   PETRA-7 SCORE 10/12/2021 11/5/2021 11/19/2021 12/10/2021 12/28/2021 2/24/2022 3/11/2022   Total Score 2 (minimal anxiety) 3 (minimal anxiety) 0 (minimal anxiety) 0 (minimal anxiety) 0 (minimal anxiety) 0 (minimal anxiety) 0 (minimal anxiety)   Total Score 2 3 0 0 0 0 0     PROMIS 10-Global Health (all questions and answers displayed):   PROMIS 10 12/10/2021 3/11/2022 3/22/2022   In general, would you say your health is: Good Very good Good   In general, would you say your quality of life is: Good Very good Very good   In general, how would you rate your physical health? Fair Good Good   In general, how would you rate your mental health, including your mood and your ability to think? Fair Good Good   In general, how would you rate your satisfaction with your social activities and relationships? Good Good Good   In general, please rate how well you carry out your usual social activities and roles Good Very good Very good   To what extent are you able to carry out your everyday physical activities such as walking, climbing stairs, carrying groceries, or moving a chair? Completely Completely Completely   How often have you been bothered by emotional problems  such as feeling anxious, depressed or irritable? Never Rarely Rarely   How would you rate your fatigue on average? Mild Mild Mild   How would you rate your pain on average?   0 = No Pain  to  10 = Worst Imaginable Pain 0 0 0   In general, would you say your health is: 3 4 3   In general, would you say your quality of life is: 3 4 4   In general, how would you rate your physical health? 2 3 3   In general, how would you rate your mental health, including your mood and your ability to think? 2 3 3   In general, how would you rate your satisfaction with your social activities and relationships? 3 3 3   In general, please rate how well you carry out your usual social activities and roles. (This includes activities at home, at work and in your community, and responsibilities as a parent, child, spouse, employee, friend, etc.) 3 4 4   To what extent are you able to carry out your everyday physical activities such as walking, climbing stairs, carrying groceries, or moving a chair? 5 5 5   In the past 7 days, how often have you been bothered by emotional problems such as feeling anxious, depressed, or irritable? 1 2 2   In the past 7 days, how would you rate your fatigue on average? 2 2 2   In the past 7 days, how would you rate your pain on average, where 0 means no pain, and 10 means worst imaginable pain? 0 0 0   Global Mental Health Score 13 14 14   Global Physical Health Score 16 17 17   PROMIS TOTAL - SUBSCORES 29 31 31   Some recent data might be hidden         ASSESSMENT: Current Emotional / Mental Status (status of significant symptoms):   Risk status (Self / Other harm or suicidal ideation)   Patient denies current fears or concerns for personal safety.   Patient denies current or recent suicidal ideation or behaviors.   Patient denies current or recent homicidal ideation or behaviors.   Patient denies current or recent self injurious behavior or ideation.   Patient denies other safety concerns.   Patient reports  there has been no change in risk factors since their last session.     Patient reports there has been no change in protective factors since their last session.     Recommended that patient call 911 or go to the local ED should there be a change in any of these risk factors.     Appearance:   Appropriate    Eye Contact:   Good    Psychomotor Behavior: Normal    Attitude:   Cooperative  Interested Friendly Pleasant Attentive   Orientation:   All   Speech    Rate / Production: Normal     Volume:  Normal    Mood:    Normal   Affect:    Appropriate  Subdued    Thought Content:  Clear    Thought Form:  Coherent  Goal Directed  Logical    Insight:    Good      Medication Review:   No changes to current psychiatric medication(s)     Medication Compliance:   Yes     Changes in Health Issues:   None reported     Chemical Use Review:   Substance Use: Chemical use reviewed, no active concerns identified      Tobacco Use: No current tobacco use.      Diagnosis:  1. Major depressive disorder, recurrent, in partial remission (H)        Collateral Reports Completed:   Not Applicable    PLAN: (Patient Tasks / Therapist Tasks / Other)  Patient agreed to work on being effective in his responses to stressors, focusing on doing what the situation calls for/what needs to be done (e.g. studying for his test, completing school application, etc.).        Chetan Fitch, LMFT 5/13/22                                                         ______________________________________________________________________    Individual Treatment Plan    Patient's Name: Klebo J Skjervold David  YOB: 2000    Date of Creation: 2/24/22  Date Treatment Plan Last Reviewed/Revised: 2/24/22, next due 5/24/22    DSM5 Diagnoses: 296.35 (F33.41)  Major Depressive Disorder, Recurrent Episode, In partial remission _  Psychosocial / Contextual Factors: .  Cultural influences and impact on patient's life structure, values, norms, and  healthcare: very in volved in [place].  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  PROMIS (reviewed every 90 days): 31    Referral / Collaboration:  Referral to another professional/service is not indicated at this time..    Anticipated number of session for this episode of care: 11-20  Anticipation frequency of session: Weekly  Anticipated Duration of each session: 38-52 minutes  Treatment plan will be reviewed in 90 days or when goals have been changed.       MeasurableTreatment Goal(s) related to diagnosis / functional impairment(s)  Goal 1: Patient will improve overall baseline mood by 2 points on a 1-10 Likert scale per self-report (10 = optimal mood).    I will know I've met my goal when I feel better/less depressed overall and have improved my life structure/organization.       Objective #A (Client Action)                Status: Continued - Date:  2/24/22     Patient will Identify negative self-talk and behaviors: challenge core beliefs, myths, and actions.     Intervention(s)  Therapist will teach emotional regulation skills. CBT/REBT ABCD model.     Objective #B  Patient will Increase interest, engagement, and pleasure in doing things  Decrease frequency and intensity of feeling down, depressed, hopeless  Improve concentration, focus, and mindfulness in daily activities .                 Status: Continued - Date:  2/24/22     Intervention(s)  Therapist will teach emotional regulation skills. DBT Core Mindfulness, Distress Tolerance, Emotion Regulation, and Interpersonal Effetiveness skills.     Patient has reviewed and agreed to the above plan.      Chetan Fitch, LMFT  February 24, 2022

## 2022-06-03 ENCOUNTER — VIRTUAL VISIT (OUTPATIENT)
Dept: PSYCHOLOGY | Facility: CLINIC | Age: 22
End: 2022-06-03
Payer: COMMERCIAL

## 2022-06-03 DIAGNOSIS — F33.41 MAJOR DEPRESSIVE DISORDER, RECURRENT, IN PARTIAL REMISSION (H): Primary | ICD-10-CM

## 2022-06-03 PROCEDURE — 90832 PSYTX W PT 30 MINUTES: CPT | Mod: GT | Performed by: MARRIAGE & FAMILY THERAPIST

## 2022-06-03 ASSESSMENT — ANXIETY QUESTIONNAIRES
7. FEELING AFRAID AS IF SOMETHING AWFUL MIGHT HAPPEN: NOT AT ALL
6. BECOMING EASILY ANNOYED OR IRRITABLE: NOT AT ALL
GAD7 TOTAL SCORE: 0
5. BEING SO RESTLESS THAT IT IS HARD TO SIT STILL: NOT AT ALL
GAD7 TOTAL SCORE: 0
8. IF YOU CHECKED OFF ANY PROBLEMS, HOW DIFFICULT HAVE THESE MADE IT FOR YOU TO DO YOUR WORK, TAKE CARE OF THINGS AT HOME, OR GET ALONG WITH OTHER PEOPLE?: NOT DIFFICULT AT ALL
2. NOT BEING ABLE TO STOP OR CONTROL WORRYING: NOT AT ALL
7. FEELING AFRAID AS IF SOMETHING AWFUL MIGHT HAPPEN: NOT AT ALL
1. FEELING NERVOUS, ANXIOUS, OR ON EDGE: NOT AT ALL
3. WORRYING TOO MUCH ABOUT DIFFERENT THINGS: NOT AT ALL
GAD7 TOTAL SCORE: 0
4. TROUBLE RELAXING: NOT AT ALL

## 2022-06-03 NOTE — PROGRESS NOTES
[facility]                                     Progress Note    Patient Name: Klebo J Skjervold David  Date: 5/13/22         Service Type: Individual      Session Start Time: 2:05  Session End Time: 2:30     Session Length: 25    Session #: 12    Attendees: Client attended alone    Service Modality:  Video Visit:      Provider verified identity through the following two step process.  Patient provided:  Patient is known previously to provider and Patient was verified at admission/transfer    Telemedicine Visit: The patient's condition can be safely assessed and treated via synchronous audio and visual telemedicine encounter.      Reason for Telemedicine Visit: Services only offered telehealth    Originating Site (Patient Location): Patient's home    Distant Site (Provider Location): Provider Remote Setting- Home Office    Consent:  The patient/guardian has verbally consented to: the potential risks and benefits of telemedicine (video visit) versus in person care; bill my insurance or make self-payment for services provided; and responsibility for payment of non-covered services.     Patient would like the video invitation sent by:  My Chart    Mode of Communication:  Video Conference via Amwell    As the provider I attest to compliance with applicable laws and regulations related to telemedicine.    DATA  Interactive Complexity: No  Crisis: No        Progress Since Last Session (Related to Symptoms / Goals / Homework):   Symptoms: Improving mood/outlook.    Homework: Achieved / completed to satisfaction      Episode of Care Goals: Satisfactory progress - ACTION (Actively working towards change); Intervened by reinforcing change plan / affirming steps taken     Current / Ongoing Stressors and Concerns:   Patient reported continuing to experience minimal ongoing depression and anxiety lately.  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  Patient reported small concern about his past pattern of procrastination, but identified no other/major concerns today, so opted for short session and scheduling out.     Treatment Objective(s) Addressed in This Session:   Increase interest, engagement, and pleasure in doing things   Patient identified his desired continued therapy goal.     Intervention:   DBT: re-reviewed with patient being effective in his responses to stressors, focusing on doing what the situation calls for/what needs to be done, and engaging in enjoyable activities.  Motivational Interviewing: reviewed with patient his desired continued therapy goal.    Assessments completed prior to visit:  The following assessments were completed by patient for this visit:  PHQ9:   PHQ-9 SCORE 12/28/2021 1/27/2022 2/24/2022 3/11/2022 3/22/2022 4/22/2022 6/3/2022   PHQ-9 Total Score - - - - - - -   PHQ-9 Total Score MyChart 0 0 0 0 0 0 0   PHQ-9 Total Score 0 0 0 0 0 0 0     GAD7:   PETRA-7 SCORE 11/5/2021 11/19/2021 12/10/2021 12/28/2021 2/24/2022 3/11/2022 6/3/2022   Total Score 3 (minimal anxiety) 0 (minimal anxiety) 0 (minimal anxiety) 0 (minimal anxiety) 0 (minimal anxiety) 0 (minimal anxiety) 0 (minimal anxiety)   Total Score 3 0 0 0 0 0 0     PROMIS 10-Global Health (all questions and answers displayed):   PROMIS 10 12/10/2021 3/11/2022 3/22/2022   In general, would you say your health is: Good Very good Good   In general, would you say your quality of life is: Good Very good Very good   In general, how would you rate your physical health? Fair Good Good   In general, how would you rate your mental health, including your mood and your ability to think? Fair Good Good   In general, how would you rate your satisfaction with your social activities and relationships? Good Good Good   In general, please rate how well you carry out your usual social activities and roles Good Very good Very good   To what extent are you able to carry out your everyday  physical activities such as walking, climbing stairs, carrying groceries, or moving a chair? Completely Completely Completely   How often have you been bothered by emotional problems such as feeling anxious, depressed or irritable? Never Rarely Rarely   How would you rate your fatigue on average? Mild Mild Mild   How would you rate your pain on average?   0 = No Pain  to  10 = Worst Imaginable Pain 0 0 0   In general, would you say your health is: 3 4 3   In general, would you say your quality of life is: 3 4 4   In general, how would you rate your physical health? 2 3 3   In general, how would you rate your mental health, including your mood and your ability to think? 2 3 3   In general, how would you rate your satisfaction with your social activities and relationships? 3 3 3   In general, please rate how well you carry out your usual social activities and roles. (This includes activities at home, at work and in your community, and responsibilities as a parent, child, spouse, employee, friend, etc.) 3 4 4   To what extent are you able to carry out your everyday physical activities such as walking, climbing stairs, carrying groceries, or moving a chair? 5 5 5   In the past 7 days, how often have you been bothered by emotional problems such as feeling anxious, depressed, or irritable? 1 2 2   In the past 7 days, how would you rate your fatigue on average? 2 2 2   In the past 7 days, how would you rate your pain on average, where 0 means no pain, and 10 means worst imaginable pain? 0 0 0   Global Mental Health Score 13 14 14   Global Physical Health Score 16 17 17   PROMIS TOTAL - SUBSCORES 29 31 31   Some recent data might be hidden         ASSESSMENT: Current Emotional / Mental Status (status of significant symptoms):   Risk status (Self / Other harm or suicidal ideation)   Patient denies current fears or concerns for personal safety.   Patient denies current or recent suicidal ideation or behaviors.   Patient  denies current or recent homicidal ideation or behaviors.   Patient denies current or recent self injurious behavior or ideation.   Patient denies other safety concerns.   Patient reports there has been no change in risk factors since their last session.     Patient reports there has been no change in protective factors since their last session.     Recommended that patient call 911 or go to the local ED should there be a change in any of these risk factors.     Appearance:   Appropriate    Eye Contact:   Good    Psychomotor Behavior: Normal    Attitude:   Cooperative  Interested Friendly Pleasant Attentive   Orientation:   All   Speech    Rate / Production: Normal/ Responsive Talkative    Volume:  Normal    Mood:    Normal   Affect:    Appropriate  Subdued    Thought Content:  Clear    Thought Form:  Coherent  Goal Directed  Logical    Insight:    Good      Medication Review:   No changes to current psychiatric medication(s)     Medication Compliance:   Yes     Changes in Health Issues:   None reported     Chemical Use Review:   Substance Use: Chemical use reviewed, no active concerns identified      Tobacco Use: No current tobacco use.      Diagnosis:  1. Major depressive disorder, recurrent, in partial remission (H)        Collateral Reports Completed:   Not Applicable    PLAN: (Patient Tasks / Therapist Tasks / Other)  Patient agreed to continue to work on being effective in his responses to stressors, focusing on doing what the situation calls for/what needs to be done (e.g. following his study plan for his class), and engaging in enjoyable activities (e.g. fishing with friends).        Chetan Fitch, ARTUR 6/3/22                                                         ______________________________________________________________________    Individual Treatment Plan    Patient's Name: Klebo J Skjervold David  YOB: 2000    Date of Creation: 2/24/22  Date Treatment Plan Last  Reviewed/Revised: 6/3/22, next due 9/3/22    DSM5 Diagnoses: 296.35 (F33.41)  Major Depressive Disorder, Recurrent Episode, In partial remission _  Psychosocial / Contextual Factors: .  Cultural influences and impact on patient's life structure, values, norms, and healthcare: very in volved in [place].  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  PROMIS (reviewed every 90 days): 31    Referral / Collaboration:  Referral to another professional/service is not indicated at this time..    Anticipated number of session for this episode of care: 11-20  Anticipation frequency of session: Weekly  Anticipated Duration of each session: 38-52 minutes  Treatment plan will be reviewed in 90 days or when goals have been changed.       MeasurableTreatment Goal(s) related to diagnosis / functional impairment(s)  Goal 1: Patient will improve overall baseline mood by 2 points on a 1-10 Likert scale per self-report (10 = optimal mood).    I will know I've met my goal when I feel better/less depressed overall and have improved my life structure/organization.       Objective #A (Client Action)                Status: Continued - Date:  6/3/22     Patient will Identify negative self-talk and behaviors: challenge core beliefs, myths, and actions.     Intervention(s)  Therapist will teach emotional regulation skills. CBT/REBT ABCD model.     Objective #B  Patient will Increase interest, engagement, and pleasure in doing things  Decrease frequency and intensity of feeling down, depressed, hopeless  Improve concentration, focus, and mindfulness in daily activities .                 Status: Continued - Date:  6/3/22     Intervention(s)  Therapist will teach emotional regulation skills. DBT Core Mindfulness, Distress  Tolerance, Emotion Regulation, and Interpersonal Effetiveness skills.     Patient has reviewed and agreed to the above plan.      Chetan Fitch, LMFT  Qu[place] 3, 2022

## 2022-06-09 ASSESSMENT — PATIENT HEALTH QUESTIONNAIRE - PHQ9
SUM OF ALL RESPONSES TO PHQ QUESTIONS 1-9: 0
SUM OF ALL RESPONSES TO PHQ QUESTIONS 1-9: 0

## 2022-06-10 ENCOUNTER — VIRTUAL VISIT (OUTPATIENT)
Dept: PSYCHOLOGY | Facility: CLINIC | Age: 22
End: 2022-06-10
Payer: COMMERCIAL

## 2022-06-10 DIAGNOSIS — F33.41 MAJOR DEPRESSIVE DISORDER, RECURRENT, IN PARTIAL REMISSION (H): Primary | ICD-10-CM

## 2022-06-10 PROCEDURE — 90832 PSYTX W PT 30 MINUTES: CPT | Mod: TEL | Performed by: MARRIAGE & FAMILY THERAPIST

## 2022-06-10 ASSESSMENT — PATIENT HEALTH QUESTIONNAIRE - PHQ9
10. IF YOU CHECKED OFF ANY PROBLEMS, HOW DIFFICULT HAVE THESE PROBLEMS MADE IT FOR YOU TO DO YOUR WORK, TAKE CARE OF THINGS AT HOME, OR GET ALONG WITH OTHER PEOPLE: NOT DIFFICULT AT ALL
SUM OF ALL RESPONSES TO PHQ QUESTIONS 1-9: 0

## 2022-06-10 NOTE — PROGRESS NOTES
"[facility]                                     Progress Note    Patient Name: Klebo J Skjervold David  Date: 6/10/22         Service Type: Individual      Session Start Time: 2:00  Session End Time: 2:16     Session Length: 16    Session #: 13    Attendees: Client attended alone    Service Modality:  Phone Visit:      The patient has been notified of the following:      \"We have found that certain health care needs can be provided without the need for a face to face visit.  This service lets us provide the care you need with a phone conversation.       I will have full access to your [facility] medical record during this entire phone call.   I will be taking notes for your medical record.      Since this is like an office visit, we will bill your insurance company for this service.       There are potential benefits and risks of telephone visits (e.g. limits to patient confidentiality) that differ from in-person visits.?  Confidentiality still applies for telephone services, and nobody will record the visit.  It is important to be in a quiet, private space that is free of distractions (including cell phone or other devices) during the visit.??      If during the course of the call I believe a telephone visit is not appropriate, you will not be charged for this service\"     Consent has been obtained for this service by care team member: Yes    DATA  Interactive Complexity: No  Crisis: No        Progress Since Last Session (Related to Symptoms / Goals / Homework):   Symptoms: Improving mood/functioning.    Homework: Achieved / completed to satisfaction      Episode of Care Goals: Satisfactory progress - ACTION (Actively working towards change); Intervened by reinforcing change plan / affirming steps taken     Current / Ongoing Stressors and Concerns:   Patient reported continuing to experience minimal ongoing depression and anxiety lately.  Patient reported keeping pace with his summer classes and " overall continuing to do well/no major concerns today, so opted for short session and continuing to schedule out.     Treatment Objective(s) Addressed in This Session:   Increase interest, engagement, and pleasure in doing things      Intervention:   DBT: re-reviewed with patient being effective in his responses to stressors, focusing on doing what the situation calls for/what needs to be done (e.g. maintaining his study plan/classes), and engaging in enjoyable activities.    Assessments completed prior to visit:  The following assessments were completed by patient for this visit:  PHQ9:   PHQ-9 SCORE 1/27/2022 2/24/2022 3/11/2022 3/22/2022 4/22/2022 6/3/2022 6/9/2022   PHQ-9 Total Score - - - - - - -   PHQ-9 Total Score MyChart 0 0 0 0 0 0 0   PHQ-9 Total Score 0 0 0 0 0 0 0     GAD7:   PETRA-7 SCORE 11/5/2021 11/19/2021 12/10/2021 12/28/2021 2/24/2022 3/11/2022 6/3/2022   Total Score 3 (minimal anxiety) 0 (minimal anxiety) 0 (minimal anxiety) 0 (minimal anxiety) 0 (minimal anxiety) 0 (minimal anxiety) 0 (minimal anxiety)   Total Score 3 0 0 0 0 0 0     PROMIS 10-Global Health (all questions and answers displayed):   PROMIS 10 12/10/2021 3/11/2022 3/22/2022   In general, would you say your health is: Good Very good Good   In general, would you say your quality of life is: Good Very good Very good   In general, how would you rate your physical health? Fair Good Good   In general, how would you rate your mental health, including your mood and your ability to think? Fair Good Good   In general, how would you rate your satisfaction with your social activities and relationships? Good Good Good   In general, please rate how well you carry out your usual social activities and roles Good Very good Very good   To what extent are you able to carry out your everyday physical activities such as walking, climbing stairs, carrying groceries, or moving a chair? Completely Completely Completely   How often have you been bothered by  emotional problems such as feeling anxious, depressed or irritable? Never Rarely Rarely   How would you rate your fatigue on average? Mild Mild Mild   How would you rate your pain on average?   0 = No Pain  to  10 = Worst Imaginable Pain 0 0 0   In general, would you say your health is: 3 4 3   In general, would you say your quality of life is: 3 4 4   In general, how would you rate your physical health? 2 3 3   In general, how would you rate your mental health, including your mood and your ability to think? 2 3 3   In general, how would you rate your satisfaction with your social activities and relationships? 3 3 3   In general, please rate how well you carry out your usual social activities and roles. (This includes activities at home, at work and in your community, and responsibilities as a parent, child, spouse, employee, friend, etc.) 3 4 4   To what extent are you able to carry out your everyday physical activities such as walking, climbing stairs, carrying groceries, or moving a chair? 5 5 5   In the past 7 days, how often have you been bothered by emotional problems such as feeling anxious, depressed, or irritable? 1 2 2   In the past 7 days, how would you rate your fatigue on average? 2 2 2   In the past 7 days, how would you rate your pain on average, where 0 means no pain, and 10 means worst imaginable pain? 0 0 0   Global Mental Health Score 13 14 14   Global Physical Health Score 16 17 17   PROMIS TOTAL - SUBSCORES 29 31 31   Some recent data might be hidden         ASSESSMENT: Current Emotional / Mental Status (status of significant symptoms):   Risk status (Self / Other harm or suicidal ideation)   Patient denies current fears or concerns for personal safety.   Patient denies current or recent suicidal ideation or behaviors.   Patient denies current or recent homicidal ideation or behaviors.   Patient denies current or recent self injurious behavior or ideation.   Patient denies other safety  concerns.   Patient reports there has been no change in risk factors since their last session.     Patient reports there has been no change in protective factors since their last session.     Recommended that patient call 911 or go to the local ED should there be a change in any of these risk factors.     Appearance:   unable to assess (phone visit)    Eye Contact:   unable to assess (phone visit)    Psychomotor Behavior: unable to assess (phone visit)    Attitude:   Cooperative  Interested Friendly Pleasant Attentive   Orientation:   All   Speech    Rate / Production: Normal/ Responsive Talkative    Volume:  Normal    Mood:    Normal   Affect:    unable to assess (phone visit)    Thought Content:  Clear    Thought Form:  Coherent  Goal Directed  Logical    Insight:    Good      Medication Review:   No changes to current psychiatric medication(s)     Medication Compliance:   Yes     Changes in Health Issues:   None reported     Chemical Use Review:   Substance Use: Chemical use reviewed, no active concerns identified      Tobacco Use: No current tobacco use.      Diagnosis:  1. Major depressive disorder, recurrent, in partial remission (H)        Collateral Reports Completed:   Not Applicable    PLAN: (Patient Tasks / Therapist Tasks / Other)  Patient agreed to continue to work on being effective in his responses to stressors, focusing on doing what the situation calls for/what needs to be done (e.g. maintaining his study plan/classes), and engaging in enjoyable activities (e.g. outdoors/boating, etc.).        Chetan Fitch, [facility] 6/10/22                                                         ______________________________________________________________________    Individual Treatment Plan    Patient's Name: Klebo J Skjervold David  YOB: 2000    Date of Creation: 2/24/22  Date Treatment Plan Last Reviewed/Revised: 6/3/22, next due 9/3/22    DSM5 Diagnoses: 296.35 (F33.41)  Major Depressive  Disorder, Recurrent Episode, In partial remission _  Psychosocial / Contextual Factors: .  Cultural influences and impact on patient's life structure, values, norms, and healthcare: very in volved in [place].  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  PROMIS (reviewed every 90 days): 31    Referral / Collaboration:  Referral to another professional/service is not indicated at this time..    Anticipated number of session for this episode of care: 11-20  Anticipation frequency of session: Weekly  Anticipated Duration of each session: 38-52 minutes  Treatment plan will be reviewed in 90 days or when goals have been changed.       MeasurableTreatment Goal(s) related to diagnosis / functional impairment(s)  Goal 1: Patient will improve overall baseline mood by 2 points on a 1-10 Likert scale per self-report (10 = optimal mood).    I will know I've met my goal when I feel better/less depressed overall and have improved my life structure/organization.       Objective #A (Client Action)                Status: Continued - Date:  6/3/22     Patient will Identify negative self-talk and behaviors: challenge core beliefs, myths, and actions.     Intervention(s)  Therapist will teach emotional regulation skills. CBT/REBT ABCD model.     Objective #B  Patient will Increase interest, engagement, and pleasure in doing things  Decrease frequency and intensity of feeling down, depressed, hopeless  Improve concentration, focus, and mindfulness in daily activities .                 Status: Continued - Date:  6/3/22     Intervention(s)  Therapist will teach emotional regulation skills. DBT Core Mindfulness, Distress Tolerance, Emotion Regulation, and Interpersonal Effetiveness skills.     Patient has reviewed  and agreed to the above plan.      Chetan Fitch, LMFT  [place] 3, 2022

## 2022-07-08 ENCOUNTER — VIRTUAL VISIT (OUTPATIENT)
Dept: PSYCHOLOGY | Facility: CLINIC | Age: 22
End: 2022-07-08
Payer: COMMERCIAL

## 2022-07-08 DIAGNOSIS — F33.41 MAJOR DEPRESSIVE DISORDER, RECURRENT, IN PARTIAL REMISSION (H): Primary | ICD-10-CM

## 2022-07-08 PROCEDURE — 90832 PSYTX W PT 30 MINUTES: CPT | Mod: GT | Performed by: MARRIAGE & FAMILY THERAPIST

## 2022-07-08 NOTE — PROGRESS NOTES
[facility]                                     Progress Note    Patient Name: Klebo J Skjervold David  Date: 7/8/22         Service Type: Individual      Session Start Time: 2:00  Session End Time: 2:23     Session Length: 23    Session #: 14    Attendees: Client attended alone    Service Modality:  Video Visit:      Telemedicine Visit: The patient's condition can be safely assessed and treated via synchronous audio and visual telemedicine encounter.      Reason for Telemedicine Visit: Services only offered telehealth    Originating Site (Patient Location): Patient's home    Distant Site (Provider Location): Provider Remote Setting- Home Office    Consent:  The patient/guardian has verbally consented to: the potential risks and benefits of telemedicine (video visit) versus in person care; bill my insurance or make self-payment for services provided; and responsibility for payment of non-covered services.     Mode of Communication:  Video Conference via [email or web address]    As the provider I attest to compliance with applicable laws and regulations related to telemedicine.    DATA  Interactive Complexity: No  Crisis: No        Progress Since Last Session (Related to Symptoms / Goals / Homework):   Symptoms: minimal anxiety.    Homework: Achieved / completed to satisfaction      Episode of Care Goals: Satisfactory progress - ACTION (Actively working towards change); Intervened by reinforcing change plan / affirming steps taken     Current / Ongoing Stressors and Concerns:   Patient reported experiencing  minimal ongoing anxiety and depression lately related to life stressors.  Patient reported continuing to keep pace with his summer classes, which are ending in two weeks, but having some minor frustrations with one of the courses.  Patient reported continuing to do well overall/no major concerns today, so opted for short session and continuing to schedule out.     Treatment Objective(s) Addressed in  This Session:   use at least some coping skills for anxiety management in the next few weeks      Intervention:   DBT: re-reviewed with patient being effective in his responses to stressors, focusing on doing what works/what the situation calls for and on his goals.    Assessments completed prior to visit:  The following assessments were completed by patient for this visit:  PHQ9:   PHQ-9 SCORE 1/27/2022 2/24/2022 3/11/2022 3/22/2022 4/22/2022 6/3/2022 6/9/2022   PHQ-9 Total Score - - - - - - -   PHQ-9 Total Score MyChart 0 0 0 0 0 0 0   PHQ-9 Total Score 0 0 0 0 0 0 0     GAD7:   PETRA-7 SCORE 11/5/2021 11/19/2021 12/10/2021 12/28/2021 2/24/2022 3/11/2022 6/3/2022   Total Score 3 (minimal anxiety) 0 (minimal anxiety) 0 (minimal anxiety) 0 (minimal anxiety) 0 (minimal anxiety) 0 (minimal anxiety) 0 (minimal anxiety)   Total Score 3 0 0 0 0 0 0     PROMIS 10-Global Health (all questions and answers displayed):   PROMIS 10 12/10/2021 3/11/2022 3/22/2022   In general, would you say your health is: Good Very good Good   In general, would you say your quality of life is: Good Very good Very good   In general, how would you rate your physical health? Fair Good Good   In general, how would you rate your mental health, including your mood and your ability to think? Fair Good Good   In general, how would you rate your satisfaction with your social activities and relationships? Good Good Good   In general, please rate how well you carry out your usual social activities and roles Good Very good Very good   To what extent are you able to carry out your everyday physical activities such as walking, climbing stairs, carrying groceries, or moving a chair? Completely Completely Completely   How often have you been bothered by emotional problems such as feeling anxious, depressed or irritable? Never Rarely Rarely   How would you rate your fatigue on average? Mild Mild Mild   How would you rate your pain on average?   0 = No Pain  to   10 = Worst Imaginable Pain 0 0 0   In general, would you say your health is: 3 4 3   In general, would you say your quality of life is: 3 4 4   In general, how would you rate your physical health? 2 3 3   In general, how would you rate your mental health, including your mood and your ability to think? 2 3 3   In general, how would you rate your satisfaction with your social activities and relationships? 3 3 3   In general, please rate how well you carry out your usual social activities and roles. (This includes activities at home, at work and in your community, and responsibilities as a parent, child, spouse, employee, friend, etc.) 3 4 4   To what extent are you able to carry out your everyday physical activities such as walking, climbing stairs, carrying groceries, or moving a chair? 5 5 5   In the past 7 days, how often have you been bothered by emotional problems such as feeling anxious, depressed, or irritable? 1 2 2   In the past 7 days, how would you rate your fatigue on average? 2 2 2   In the past 7 days, how would you rate your pain on average, where 0 means no pain, and 10 means worst imaginable pain? 0 0 0   Global Mental Health Score 13 14 14   Global Physical Health Score 16 17 17   PROMIS TOTAL - SUBSCORES 29 31 31   Some recent data might be hidden         ASSESSMENT: Current Emotional / Mental Status (status of significant symptoms):   Risk status (Self / Other harm or suicidal ideation)   Patient denies current fears or concerns for personal safety.   Patient denies current or recent suicidal ideation or behaviors.   Patient denies current or recent homicidal ideation or behaviors.   Patient denies current or recent self injurious behavior or ideation.   Patient denies other safety concerns.   Patient reports there has been no change in risk factors since their last session.     Patient reports there has been no change in protective factors since their last session.     Recommended that patient  call 911 or go to the local ED should there be a change in any of these risk factors.     Appearance:   Appropriate    Eye Contact:   Good    Psychomotor Behavior: Normal    Attitude:   Cooperative  Interested Friendly Pleasant Attentive   Orientation:   All   Speech    Rate / Production: Normal/ Responsive    Volume:  Normal    Mood:    Anxious  Normal   Affect:    Appropriate  Subdued  Worrisome    Thought Content:  Clear  Rumination    Thought Form:  Coherent  Goal Directed  Logical    Insight:    Good  and Intellectual Insight     Medication Review:   No changes to current psychiatric medication(s)     Medication Compliance:   Yes     Changes in Health Issues:   None reported     Chemical Use Review:   Substance Use: Chemical use reviewed, no active concerns identified      Tobacco Use: No current tobacco use.      Diagnosis:  1. Major depressive disorder, recurrent, in partial remission (H)        Collateral Reports Completed:   Not Applicable    PLAN: (Patient Tasks / Therapist Tasks / Other)  Patient agreed to continue to work on being effective in his responses to stressors, focusing on doing what works/what the situation calls for and on his goals.        Chetan Fitch, [facility] 7/8/22                                                         ______________________________________________________________________    Individual Treatment Plan    Patient's Name: Klebo J Skjervold David  YOB: 2000    Date of Creation: 2/24/22  Date Treatment Plan Last Reviewed/Revised: 6/3/22, next due 9/3/22    DSM5 Diagnoses: 296.35 (F33.41)  Major Depressive Disorder, Recurrent Episode, In partial remission _  Psychosocial / Contextual Factors: .  Cultural influences and impact on patient's life structure, values, norms, and healthcare: very in volved in [place].  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  PROMIS (reviewed every 90 days): 31    Referral / Collaboration:  Referral to another professional/service is not indicated at this time..    Anticipated number of session for this episode of care: 11-20  Anticipation frequency of session: Weekly  Anticipated Duration of each session: 38-52 minutes  Treatment plan will be reviewed in 90 days or when goals have been changed.       MeasurableTreatment Goal(s) related to diagnosis / functional impairment(s)  Goal 1: Patient will improve overall baseline mood by 2 points on a 1-10 Likert scale per self-report (10 = optimal mood).    I will know I've met my goal when I feel better/less depressed overall and have improved my life structure/organization.       Objective #A (Client Action)                Status: Continued - Date:  6/3/22     Patient will Identify negative self-talk and behaviors: challenge core beliefs, myths, and actions.     Intervention(s)  Therapist will teach emotional regulation skills. CBT/REBT ABCD model.     Objective #B  Patient will Increase interest, engagement, and pleasure in doing things  Decrease frequency and intensity of feeling down, depressed, hopeless  Improve concentration, focus, and mindfulness in daily activities .                 Status: Continued - Date:  6/3/22     Intervention(s)  Therapist will teach emotional regulation skills. DBT Core Mindfulness, Distress Tolerance, Emotion Regulation, and Interpersonal Effetiveness skills.     Patient has reviewed and agreed to the above plan.      Chetan Fitch, LMFT  [place] 3, 2022

## 2022-07-21 ASSESSMENT — ANXIETY QUESTIONNAIRES
1. FEELING NERVOUS, ANXIOUS, OR ON EDGE: NOT AT ALL
3. WORRYING TOO MUCH ABOUT DIFFERENT THINGS: NOT AT ALL
IF YOU CHECKED OFF ANY PROBLEMS ON THIS QUESTIONNAIRE, HOW DIFFICULT HAVE THESE PROBLEMS MADE IT FOR YOU TO DO YOUR WORK, TAKE CARE OF THINGS AT HOME, OR GET ALONG WITH OTHER PEOPLE: NOT DIFFICULT AT ALL
4. TROUBLE RELAXING: NOT AT ALL
7. FEELING AFRAID AS IF SOMETHING AWFUL MIGHT HAPPEN: NOT AT ALL
6. BECOMING EASILY ANNOYED OR IRRITABLE: NOT AT ALL
8. IF YOU CHECKED OFF ANY PROBLEMS, HOW DIFFICULT HAVE THESE MADE IT FOR YOU TO DO YOUR WORK, TAKE CARE OF THINGS AT HOME, OR GET ALONG WITH OTHER PEOPLE?: NOT DIFFICULT AT ALL
2. NOT BEING ABLE TO STOP OR CONTROL WORRYING: NOT AT ALL
GAD7 TOTAL SCORE: 0
7. FEELING AFRAID AS IF SOMETHING AWFUL MIGHT HAPPEN: NOT AT ALL
5. BEING SO RESTLESS THAT IT IS HARD TO SIT STILL: NOT AT ALL
GAD7 TOTAL SCORE: 0
GAD7 TOTAL SCORE: 0

## 2022-07-22 ENCOUNTER — VIRTUAL VISIT (OUTPATIENT)
Dept: PSYCHOLOGY | Facility: CLINIC | Age: 22
End: 2022-07-22
Payer: COMMERCIAL

## 2022-07-22 DIAGNOSIS — F33.41 MAJOR DEPRESSIVE DISORDER, RECURRENT, IN PARTIAL REMISSION (H): Primary | ICD-10-CM

## 2022-07-22 PROCEDURE — 90832 PSYTX W PT 30 MINUTES: CPT | Mod: GT | Performed by: MARRIAGE & FAMILY THERAPIST

## 2022-07-22 NOTE — PROGRESS NOTES
[facility]                                     Progress Note    Patient Name: Klebo J Skjervold David  Date: 7/22/22         Service Type: Individual      Session Start Time: 2:00  Session End Time: 2:16     Session Length: 16    Session #: 15    Attendees: Client attended alone    Service Modality:  Video Visit:      Telemedicine Visit: The patient's condition can be safely assessed and treated via synchronous audio and visual telemedicine encounter.      Reason for Telemedicine Visit: Services only offered telehealth    Originating Site (Patient Location): Patient's home    Distant Site (Provider Location): Provider Remote Setting- Home Office    Consent:  The patient/guardian has verbally consented to: the potential risks and benefits of telemedicine (video visit) versus in person care; bill my insurance or make self-payment for services provided; and responsibility for payment of non-covered services.     Mode of Communication:  Video Conference via [employer]    As the provider I attest to compliance with applicable laws and regulations related to telemedicine.    DATA  Interactive Complexity: No  Crisis: No        Progress Since Last Session (Related to Symptoms / Goals / Homework):   Symptoms: minimal anxiety.    Homework: Achieved / completed to satisfaction      Episode of Care Goals: Satisfactory progress - ACTION (Actively working towards change); Intervened by reinforcing change plan / affirming steps taken     Current / Ongoing Stressors and Concerns:   Patient reported experiencing  minimal ongoing anxiety and depression lately related to life stressors.  Patient reported passing his summer classes.  Patient reported continuing to do well overall/no major concerns today, so opted for short session and continuing to schedule out.     Treatment Objective(s) Addressed in This Session:   use at least some coping skills for anxiety management in the next few weeks  Increase interest,  engagement, and pleasure in doing things      Intervention:   DBT: re-reviewed with patient being effective in his responses to stressors, focusing on doing what works/what the situation calls for and on engaging in enjoyable activities/practicing self-care.    Assessments completed prior to visit:  The following assessments were completed by patient for this visit:  PHQ9:   PHQ-9 SCORE 2/24/2022 3/11/2022 3/22/2022 4/22/2022 6/3/2022 6/9/2022 7/21/2022   PHQ-9 Total Score - - - - - - -   PHQ-9 Total Score MyChart 0 0 0 0 0 0 0   PHQ-9 Total Score 0 0 0 0 0 0 0     GAD7:   PETRA-7 SCORE 11/19/2021 12/10/2021 12/28/2021 2/24/2022 3/11/2022 6/3/2022 7/21/2022   Total Score 0 (minimal anxiety) 0 (minimal anxiety) 0 (minimal anxiety) 0 (minimal anxiety) 0 (minimal anxiety) 0 (minimal anxiety) 0 (minimal anxiety)   Total Score 0 0 0 0 0 0 0     PROMIS 10-Global Health (all questions and answers displayed):   PROMIS 10 12/10/2021 3/11/2022 3/22/2022 7/21/2022   In general, would you say your health is: Good Very good Good Good   In general, would you say your quality of life is: Good Very good Very good Very good   In general, how would you rate your physical health? Fair Good Good Good   In general, how would you rate your mental health, including your mood and your ability to think? Fair Good Good Good   In general, how would you rate your satisfaction with your social activities and relationships? Good Good Good Good   In general, please rate how well you carry out your usual social activities and roles Good Very good Very good Good   To what extent are you able to carry out your everyday physical activities such as walking, climbing stairs, carrying groceries, or moving a chair? Completely Completely Completely Completely   How often have you been bothered by emotional problems such as feeling anxious, depressed or irritable? Never Rarely Rarely Never   How would you rate your fatigue on average? Mild Mild Mild None    How would you rate your pain on average?   0 = No Pain  to  10 = Worst Imaginable Pain 0 0 0 1   In general, would you say your health is: 3 4 3 3   In general, would you say your quality of life is: 3 4 4 4   In general, how would you rate your physical health? 2 3 3 3   In general, how would you rate your mental health, including your mood and your ability to think? 2 3 3 3   In general, how would you rate your satisfaction with your social activities and relationships? 3 3 3 3   In general, please rate how well you carry out your usual social activities and roles. (This includes activities at home, at work and in your community, and responsibilities as a parent, child, spouse, employee, friend, etc.) 3 4 4 3   To what extent are you able to carry out your everyday physical activities such as walking, climbing stairs, carrying groceries, or moving a chair? 5 5 5 5   In the past 7 days, how often have you been bothered by emotional problems such as feeling anxious, depressed, or irritable? 1 2 2 1   In the past 7 days, how would you rate your fatigue on average? 2 2 2 1   In the past 7 days, how would you rate your pain on average, where 0 means no pain, and 10 means worst imaginable pain? 0 0 0 1   Global Mental Health Score 13 14 14 15   Global Physical Health Score 16 17 17 17   PROMIS TOTAL - SUBSCORES 29 31 31 32   Some recent data might be hidden         ASSESSMENT: Current Emotional / Mental Status (status of significant symptoms):   Risk status (Self / Other harm or suicidal ideation)   Patient denies current fears or concerns for personal safety.   Patient denies current or recent suicidal ideation or behaviors.   Patient denies current or recent homicidal ideation or behaviors.   Patient denies current or recent self injurious behavior or ideation.   Patient denies other safety concerns.   Patient reports there has been no change in risk factors since their last session.     Patient reports there has  been no change in protective factors since their last session.     Recommended that patient call 911 or go to the local ED should there be a change in any of these risk factors.     Appearance:   Appropriate    Eye Contact:   Good    Psychomotor Behavior: Normal    Attitude:   Cooperative  Interested Friendly Pleasant Attentive   Orientation:   All   Speech    Rate / Production: Normal/ Responsive    Volume:  Normal    Mood:    Anxious  Normal   Affect:    Appropriate  Subdued    Thought Content:  Clear  Rumination    Thought Form:  Coherent  Goal Directed  Logical    Insight:    Good      Medication Review:   No changes to current psychiatric medication(s)     Medication Compliance:   Yes     Changes in Health Issues:   None reported     Chemical Use Review:   Substance Use: Chemical use reviewed, no active concerns identified      Tobacco Use: No current tobacco use.      Diagnosis:  1. Major depressive disorder, recurrent, in partial remission (H)        Collateral Reports Completed:   Not Applicable    PLAN: (Patient Tasks / Therapist Tasks / Other)  Patient agreed to continue to work on Dbeing effective in his responses to stressors, focusing on doing what works/what the situation calls for and on engaging in enjoyable activities/practicing self-care.        Chetan Fitch, [facility] 7/22/22                                                         ______________________________________________________________________    Individual Treatment Plan    Patient's Name: Klebo J Skjervold David  YOB: 2000    Date of Creation: 2/24/22  Date Treatment Plan Last Reviewed/Revised: 6/3/22, next due 9/3/22    DSM5 Diagnoses: 296.35 (F33.41)  Major Depressive Disorder, Recurrent Episode, In partial remission _  Psychosocial / Contextual Factors: .  Cultural influences and impact on patient's life structure, values, norms, and healthcare: very in volved in [place].  Contextual influences on patient's  health include: Individual Factors patient has Stickler's Syndrome/hearing loss/heart blockage and Family Factors grandfather diagnosed with cancer when patient was 10, lived with him for support for six months before his death.  PROMIS (reviewed every 90 days): 31    Referral / Collaboration:  Referral to another professional/service is not indicated at this time..    Anticipated number of session for this episode of care: 11-20  Anticipation frequency of session: Weekly  Anticipated Duration of each session: 38-52 minutes  Treatment plan will be reviewed in 90 days or when goals have been changed.       MeasurableTreatment Goal(s) related to diagnosis / functional impairment(s)  Goal 1: Patient will improve overall baseline mood by 2 points on a 1-10 Likert scale per self-report (10 = optimal mood).    I will know I've met my goal when I feel better/less depressed overall and have improved my life structure/organization.       Objective #A (Client Action)                Status: Continued - Date:  6/3/22     Patient will Identify negative self-talk and behaviors: challenge core beliefs, myths, and actions.     Intervention(s)  Therapist will teach emotional regulation skills. CBT/REBT ABCD model.     Objective #B  Patient will Increase interest, engagement, and pleasure in doing things  Decrease frequency and intensity of feeling down, depressed, hopeless  Improve concentration, focus, and mindfulness in daily activities .                 Status: Continued - Date:  6/3/22     Intervention(s)  Therapist will teach emotional regulation skills. DBT Core Mindfulness, Distress Tolerance, Emotion Regulation, and Interpersonal Effetiveness skills.     Patient has reviewed and agreed to the above plan.      Chetan Fitch, LMFT  [place] 3, 2022

## 2022-08-05 ENCOUNTER — VIRTUAL VISIT (OUTPATIENT)
Dept: PSYCHOLOGY | Facility: CLINIC | Age: 22
End: 2022-08-05
Payer: COMMERCIAL

## 2022-08-05 DIAGNOSIS — F33.41 MAJOR DEPRESSIVE DISORDER, RECURRENT, IN PARTIAL REMISSION (H): Primary | ICD-10-CM

## 2022-08-05 PROCEDURE — 90834 PSYTX W PT 45 MINUTES: CPT | Mod: GT | Performed by: MARRIAGE & FAMILY THERAPIST

## 2022-08-05 NOTE — PROGRESS NOTES
[facility]                                     Progress Note    Patient Name: Klebo J Skjervold David  Date: 8/5/22         Service Type: Individual      Session Start Time: 2:00  Session End Time: 2:38     Session Length: 38    Session #: 16    Attendees: Client attended alone    Service Modality:  Video Visit:      Telemedicine Visit: The patient's condition can be safely assessed and treated via synchronous audio and visual telemedicine encounter.      Reason for Telemedicine Visit: Services only offered telehealth    Originating Site (Patient Location): Patient's home    Distant Site (Provider Location): Provider Remote Setting- Home Office    Consent:  The patient/guardian has verbally consented to: the potential risks and benefits of telemedicine (video visit) versus in person care; bill my insurance or make self-payment for services provided; and responsibility for payment of non-covered services.     Mode of Communication:  Video Conference via [employer] [place]    As the provider I attest to compliance with applicable laws and regulations related to telemedicine.    DATA  Interactive Complexity: No  Crisis: No        Progress Since Last Session (Related to Symptoms / Goals / Homework):   Symptoms: Improving minimal anxiety.    Homework: Partially completed      Episode of Care Goals: Minimal progress - ACTION (Actively working towards change); Intervened by reinforcing change plan / affirming steps taken     Current / Ongoing Stressors and Concerns:   Patient reported experiencing  minimal ongoing anxiety after acute increase and depression lately related to life stressors.  Patient reported regarding his friend being in trouble but not being able to talk with him about it/offer support.  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    Assessments completed prior to visit:  The following assessments were completed by patient for this visit:  PHQ9:   PHQ-9 SCORE 3/11/2022 3/22/2022 4/22/2022 6/3/2022 6/9/2022 7/21/2022 8/5/2022   PHQ-9 Total Score - - - - - - -   PHQ-9 Total Score MyChart 0 0 0 0 0 0 0   PHQ-9 Total Score 0 0 0 0 0 0 0     GAD7:   PETRA-7 SCORE 11/19/2021 12/10/2021 12/28/2021 2/24/2022 3/11/2022 6/3/2022 7/21/2022   Total Score 0 (minimal anxiety) 0 (minimal anxiety) 0 (minimal anxiety) 0 (minimal anxiety) 0 (minimal anxiety) 0 (minimal anxiety) 0 (minimal anxiety)   Total Score 0 0 0 0 0 0 0     PROMIS 10-Global Health (all questions and answers displayed):   PROMIS 10 12/10/2021 3/11/2022 3/22/2022 7/21/2022 8/5/2022   In general, would you say your health is: Good Very good Good Good Good   In general, would you say your quality of life is: Good Very good Very good Very good Good   In general, how would you rate your physical health? Fair Good Good Good Good   In general, how would you rate your mental health, including your mood and your ability to think? Fair Good Good Good Good   In general, how would you rate your satisfaction with your social activities and relationships? Good Good Good Good Good   In general, please rate how well you carry out your usual social activities and roles Good Very good Very good Good Good   To what extent are you able to carry out your everyday physical activities such as walking, climbing stairs, carrying groceries, or moving a chair? Completely Completely Completely Completely Completely   How often have you been bothered by emotional problems such as feeling anxious, depressed or irritable? Never Rarely Rarely Never Rarely   How would you rate your  fatigue on average? Mild Mild Mild None None   How would you rate your pain on average?   0 = No Pain  to  10 = Worst Imaginable Pain 0 0 0 1 1   In general, would you say your health is: 3 4 3 3 3   In general, would you say your quality of life is: 3 4 4 4 3   In general, how would you rate your physical health? 2 3 3 3 3   In general, how would you rate your mental health, including your mood and your ability to think? 2 3 3 3 3   In general, how would you rate your satisfaction with your social activities and relationships? 3 3 3 3 3   In general, please rate how well you carry out your usual social activities and roles. (This includes activities at home, at work and in your community, and responsibilities as a parent, child, spouse, employee, friend, etc.) 3 4 4 3 3   To what extent are you able to carry out your everyday physical activities such as walking, climbing stairs, carrying groceries, or moving a chair? 5 5 5 5 5   In the past 7 days, how often have you been bothered by emotional problems such as feeling anxious, depressed, or irritable? 1 2 2 1 2   In the past 7 days, how would you rate your fatigue on average? 2 2 2 1 1   In the past 7 days, how would you rate your pain on average, where 0 means no pain, and 10 means worst imaginable pain? 0 0 0 1 1   Global Mental Health Score 13 14 14 15 13   Global Physical Health Score 16 17 17 17 17   PROMIS TOTAL - SUBSCORES 29 31 31 32 30   Some recent data might be hidden         ASSESSMENT: Current Emotional / Mental Status (status of significant symptoms):   Risk status (Self / Other harm or suicidal ideation)   Patient denies current fears or concerns for personal safety.   Patient denies current or recent suicidal ideation or behaviors.   Patient denies current or recent homicidal ideation or behaviors.   Patient denies current or recent self injurious behavior or ideation.   Patient denies other safety concerns.   Patient reports there has been no  change in risk factors since their last session.     Patient reports there has been no change in protective factors since their last session.     Recommended that patient call 911 or go to the local ED should there be a change in any of these risk factors.     Appearance:   Appropriate    Eye Contact:   Good    Psychomotor Behavior: Normal    Attitude:   Cooperative  Interested Friendly Pleasant Attentive   Orientation:   All   Speech    Rate / Production: Normal/ Responsive Talkative    Volume:  Normal    Mood:    Anxious  Normal   Affect:    Appropriate  Subdued  Worrisome    Thought Content:  Clear  Rumination    Thought Form:  Coherent  Goal Directed  Logical    Insight:    Good  and Intellectual Insight     Medication Review:   No changes to current psychiatric medication(s)     Medication Compliance:   Yes     Changes in Health Issues:   None reported     Chemical Use Review:   Substance Use: Chemical use reviewed, no active concerns identified      Tobacco Use: No current tobacco use.      Diagnosis:  1. 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        Chetan Fitch, [facility] 8/5/22                                                         ______________________________________________________________________    Individual Treatment Plan    Patient's Name: Klebo J Skjervold David  YOB: 2000    Date of Creation: 2/24/22  Date Treatment Plan Last Reviewed/Revised: 6/3/22, next due 9/3/22    DSM5 Diagnoses: 296.35 (F33.41)  Major Depressive Disorder, Recurrent Episode, In partial remission _  Psychosocial / Contextual Factors: .  Cultural influences and impact on patient's life structure, values, norms, and healthcare: very in volved in [place].   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  PROMIS (reviewed every 90 days): 31    Referral / Collaboration:  Referral to another professional/service is not indicated at this time..    Anticipated number of session for this episode of care: 11-20  Anticipation frequency of session: Weekly  Anticipated Duration of each session: 38-52 minutes  Treatment plan will be reviewed in 90 days or when goals have been changed.       MeasurableTreatment Goal(s) related to diagnosis / functional impairment(s)  Goal 1: Patient will improve overall baseline mood by 2 points on a 1-10 Likert scale per self-report (10 = optimal mood).    I will know I've met my goal when I feel better/less depressed overall and have improved my life structure/organization.       Objective #A (Client Action)                Status: Continued - Date:  6/3/22     Patient will Identify negative self-talk and behaviors: challenge core beliefs, myths, and actions.     Intervention(s)  Therapist will teach emotional regulation skills. CBT/REBT ABCD model.     Objective #B  Patient will Increase interest, engagement, and pleasure in doing things  Decrease frequency and intensity of feeling down, depressed, hopeless  Improve concentration, focus, and mindfulness in daily activities .                 Status: Continued - Date:  6/3/22     Intervention(s)  Therapist will teach emotional regulation skills. DBT Core Mindfulness, Distress Tolerance, Emotion Regulation, and Interpersonal Effetiveness skills.     Patient has reviewed and agreed to the above plan.      Chetan Fitch, LMFT  [place] 3, 2022

## 2022-08-20 ENCOUNTER — HEALTH MAINTENANCE LETTER (OUTPATIENT)
Age: 22
End: 2022-08-20

## 2022-08-24 ENCOUNTER — OFFICE VISIT (OUTPATIENT)
Dept: OPTOMETRY | Facility: CLINIC | Age: 22
End: 2022-08-24
Payer: COMMERCIAL

## 2022-08-24 DIAGNOSIS — H35.413 BILATERAL RETINAL LATTICE DEGENERATION: ICD-10-CM

## 2022-08-24 DIAGNOSIS — Q89.8 STICKLER SYNDROME: ICD-10-CM

## 2022-08-24 DIAGNOSIS — H52.223 REGULAR ASTIGMATISM OF BOTH EYES: ICD-10-CM

## 2022-08-24 DIAGNOSIS — H44.23 HIGH MYOPIA, PROGRESSIVE DEGENERATIVE, BILATERAL: ICD-10-CM

## 2022-08-24 DIAGNOSIS — Z01.01 ENCOUNTER FOR EXAMINATION OF EYES AND VISION WITH ABNORMAL FINDINGS: Primary | ICD-10-CM

## 2022-08-24 PROCEDURE — 92004 COMPRE OPH EXAM NEW PT 1/>: CPT | Performed by: OPTOMETRIST

## 2022-08-24 PROCEDURE — 92015 DETERMINE REFRACTIVE STATE: CPT | Performed by: OPTOMETRIST

## 2022-08-24 ASSESSMENT — TONOMETRY
IOP_METHOD: APPLANATION
OD_IOP_MMHG: 22
OS_IOP_MMHG: 21

## 2022-08-24 ASSESSMENT — EXTERNAL EXAM - LEFT EYE: OS_EXAM: NORMAL

## 2022-08-24 ASSESSMENT — REFRACTION_CURRENTRX
OS_SPHERE: -16.25
OD_AXIS: 085
OD_BASECURVE: 8.60
OD_BRAND: COOPERVISION
OS_BASECURVE: 8.60
OD_CYLINDER: -2.75
OS_AXIS: 100
OS_BRAND: COOPERVISION
OS_CYLINDER: -0.75
OD_SPHERE: -13.75

## 2022-08-24 ASSESSMENT — SLIT LAMP EXAM - LIDS
COMMENTS: NORMAL
COMMENTS: NORMAL

## 2022-08-24 ASSESSMENT — CUP TO DISC RATIO
OD_RATIO: 0.4
OS_RATIO: 0.4

## 2022-08-24 ASSESSMENT — VISUAL ACUITY
OD_CC: 20/20
OS_CC: 20/30+1
CORRECTION_TYPE: CONTACTS
OD_CC+: -2
OS_CC+: -1
METHOD: SNELLEN - LINEAR
OD_CC: 20/20
OS_CC: 20/25

## 2022-08-24 ASSESSMENT — REFRACTION_WEARINGRX
SPECS_TYPE: SVL
OD_CYLINDER: +2.75
OD_SPHERE: -19.75
OS_SPHERE: -19.00
OD_AXIS: 169
OS_CYLINDER: +0.75
OS_AXIS: 089

## 2022-08-24 ASSESSMENT — REFRACTION_MANIFEST
OS_CYLINDER: +0.75
OS_SPHERE: -19.00
OD_AXIS: 180
OS_AXIS: 090
OD_CYLINDER: +3.00
OD_SPHERE: -20.00

## 2022-08-24 ASSESSMENT — CONF VISUAL FIELD
OS_NORMAL: 1
METHOD: COUNTING FINGERS
OD_NORMAL: 1

## 2022-08-24 ASSESSMENT — EXTERNAL EXAM - RIGHT EYE: OD_EXAM: NORMAL

## 2022-08-24 NOTE — PROGRESS NOTES
"Chief Complaint   Patient presents with     Annual Eye Exam     New Eval For Contact Lens     Accompanied by mother, Kacy    Monitored at [facility] for retinal defects - last monitored before COVID - would like report sent to [facility]     Previous contact lens wearer? Yes: Hyrdasoft Tor[place] each eye (lenses discontinued in 2020)  Comfort of contact lenses : Good  Satisfied with current lenses: Yes    OK with $75 fitting fee    Last Eye Exam: 7/8/2019  Dilated Previously: Yes, side effects of dilation explained today    What are you currently using to see?  glasses and contacts  - wears contacts most of the time    Distance Vision Acuity: Satisfied with vision    Near Vision Acuity: Satisfied with vision while reading and using computer with glasses and contacts    Eye Comfort: good  Do you use eye drops? : No  Occupation or Hobbies: Student       Sheri Silva     Medical, surgical and family histories reviewed and updated 8/24/2022.       OBJECTIVE: See Ophthalmology exam    ASSESSMENT:    ICD-10-CM    1. 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 High myopia, progressive degenerative, bilateral  H44.23    5. Regular astigmatism of both eyes  H52.223       PLAN:     Patient Instructions   You have lattice degeneration, which is a thinning of the peripheral retina.  This puts you at a slightly higher risk of developing a tear in the retina.  The signs of a retinal detachment can include a large change in \"floaters\", flashes of light or a \"curtain veil\" covering the vision.  If you should notice any of these changes, return to the clinic immediately.     Updated glasses prescription provided today.     I will send a copy of today's exam note to Dr. Grant.     Hydrasoft contact lenses discontinued. Recommend evaluation with contact lens specialist.  I will enter referral to [facility] for a " specialty contact lens fitting.    The effects of the dilating drops last for 4- 6 hours.  You will be more sensitive to light and vision will be blurry up close.  Mydriatic sunglasses were given if needed.     Mack Shaw, [address] [place]  [address]    [phone]

## 2022-08-24 NOTE — LETTER
"    8/24/2022         RE: Klebo J Skjervold David  [address] [id number]        Dear Colleague,    Thank you for referring your patient, Klebo J Skjervold David, to the [facility]. Please see a copy of my visit note below.    Chief Complaint   Patient presents with     Annual Eye Exam     New Eval For Contact Lens     Accompanied by mother, Kacy    Monitored at [facility] for retinal defects - last monitored before COVID - would like report sent to [facility]     Previous contact lens wearer? Yes: Hyrdasoft Toric each eye (lenses discontinued in 2020)  Comfort of contact lenses : Good  Satisfied with current lenses: Yes    OK with $75 fitting fee    Last Eye Exam: 7/8/2019  Dilated Previously: Yes, side effects of dilation explained today    What are you currently using to see?  glasses and contacts  - wears contacts most of the time    Distance Vision Acuity: Satisfied with vision    Near Vision Acuity: Satisfied with vision while reading and using computer with glasses and contacts    Eye Comfort: good  Do you use eye drops? : No  Occupation or Hobbies: Student       Sheri Silva     Medical, surgical and family histories reviewed and updated 8/24/2022.       OBJECTIVE: See Ophthalmology exam    ASSESSMENT:    ICD-10-CM    1. 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 High myopia, progressive degenerative, bilateral  H44.23    5. Regular astigmatism of both eyes  H52.223       PLAN:     Patient Instructions   You have lattice degeneration, which is a thinning of the peripheral retina.  This puts you at a slightly higher risk of developing a tear in the retina.  The signs of a retinal detachment can include a large change in \"floaters\", flashes of light or a \"curtain veil\" covering the vision.  If you should notice any of these changes, return to the clinic " immediately.     Updated glasses prescription provided today.     I will send a copy of today's exam note to Dr. Grant.     Hydrasoft contact lenses discontinued. Recommend evaluation with contact lens specialist.  I will enter referral to [facility] for a specialty contact lens fitting.    The effects of the dilating drops last for 4- 6 hours.  You will be more sensitive to light and vision will be blurry up close.  Mydriatic sunglasses were given if needed.     Mack Shaw, [facility] L  [address] [place]  [address]    [phone]                  Again, thank you for allowing me to participate in the care of your patient.        Sincerely,        Mack Shaw OD

## 2022-08-24 NOTE — PATIENT INSTRUCTIONS
"You have lattice degeneration, which is a thinning of the peripheral retina.  This puts you at a slightly higher risk of developing a tear in the retina.  The signs of a retinal detachment can include a large change in \"floaters\", flashes of light or a \"curtain veil\" covering the vision.  If you should notice any of these changes, return to the clinic immediately.     Updated glasses prescription provided today.     I will send a copy of today's exam note to Dr. Grant.     Hydrasoft contact lenses discontinued. Recommend evaluation with contact lens specialist.  I will enter referral to [facility] for a specialty contact lens fitting.    The effects of the dilating drops last for 4- 6 hours.  You will be more sensitive to light and vision will be blurry up close.  Mydriatic sunglasses were given if needed.     Mack Shaw, OD  [facility]  [address] [place] [address]. [place]  [address]    [phone]   "

## 2022-09-16 ENCOUNTER — VIRTUAL VISIT (OUTPATIENT)
Dept: PSYCHOLOGY | Facility: CLINIC | Age: 22
End: 2022-09-16
Payer: COMMERCIAL

## 2022-09-16 DIAGNOSIS — F33.41 MAJOR DEPRESSIVE DISORDER, RECURRENT, IN PARTIAL REMISSION (H): Primary | ICD-10-CM

## 2022-09-16 PROCEDURE — 90832 PSYTX W PT 30 MINUTES: CPT | Mod: GT | Performed by: MARRIAGE & FAMILY THERAPIST

## 2022-09-16 NOTE — PROGRESS NOTES
[facility]                                     Progress Note    Patient Name: Klebo J Skjervold David  Date: 9/16/22         Service Type: Individual      Session Start Time: 12:00  Session End Time: 12:31     Session Length: 31    Session #: 17    Attendees: Client attended alone    Service Modality:  Video Visit:      Telemedicine Visit: The patient's condition can be safely assessed and treated via synchronous audio and visual telemedicine encounter.      Reason for Telemedicine Visit: Services only offered telehealth    Originating Site (Patient Location): Patient's home    Distant Site (Provider Location): Provider Remote Setting- Home Office    Consent:  The patient/guardian has verbally consented to: the potential risks and benefits of telemedicine (video visit) versus in person care; bill my insurance or make self-payment for services provided; and responsibility for payment of non-covered services.     Mode of Communication:  Video Conference via [email or web address]    As the provider I attest to compliance with applicable laws and regulations related to telemedicine.    DATA  Interactive Complexity: No  Crisis: No        Progress Since Last Session (Related to Symptoms / Goals / Homework):   Symptoms: Improving continued minimal anxiety/depression.    Homework: Partially completed      Episode of Care Goals: Satisfactory progress - ACTION (Actively working towards change); Intervened by reinforcing change plan / affirming steps taken     Current / Ongoing Stressors and Concerns:   Patient reported experiencing  minimal ongoing anxiety and depression lately related to life stressors.  Patient reported regarding one of his acquaintances working as an EMT using drugs, and wondering about making a report.  Patient reported school going well and minimal anxiety.     Treatment Objective(s) Addressed in This Session:   use at least some coping skills for anxiety management in the next few weeks       Intervention:   Solution Focused: reviewed with patient contemplating reaching out to others for advice/ regarding reporting his professional acquaintance.    Assessments completed prior to visit:  The following assessments were completed by patient for this visit:  PHQ9:   PHQ-9 SCORE 3/22/2022 4/22/2022 6/3/2022 6/9/2022 7/21/2022 8/5/2022 9/15/2022   PHQ-9 Total Score - - - - - - -   PHQ-9 Total Score MyChart 0 0 0 0 0 0 0   PHQ-9 Total Score 0 0 0 0 0 0 0     GAD7:   PETRA-7 SCORE 11/19/2021 12/10/2021 12/28/2021 2/24/2022 3/11/2022 6/3/2022 7/21/2022   Total Score 0 (minimal anxiety) 0 (minimal anxiety) 0 (minimal anxiety) 0 (minimal anxiety) 0 (minimal anxiety) 0 (minimal anxiety) 0 (minimal anxiety)   Total Score 0 0 0 0 0 0 0     PROMIS 10-Global Health (all questions and answers displayed):   PROMIS 10 12/10/2021 3/11/2022 3/22/2022 7/21/2022 8/5/2022   In general, would you say your health is: Good Very good Good Good Good   In general, would you say your quality of life is: Good Very good Very good Very good Good   In general, how would you rate your physical health? Fair Good Good Good Good   In general, how would you rate your mental health, including your mood and your ability to think? Fair Good Good Good Good   In general, how would you rate your satisfaction with your social activities and relationships? Good Good Good Good Good   In general, please rate how well you carry out your usual social activities and roles Good Very good Very good Good Good   To what extent are you able to carry out your everyday physical activities such as walking, climbing stairs, carrying groceries, or moving a chair? Completely Completely Completely Completely Completely   How often have you been bothered by emotional problems such as feeling anxious, depressed or irritable? Never Rarely Rarely Never Rarely   How would you rate your fatigue on average? Mild Mild Mild None None   How would you rate your pain on  average?   0 = No Pain  to  10 = Worst Imaginable Pain 0 0 0 1 1   In general, would you say your health is: 3 4 3 3 3   In general, would you say your quality of life is: 3 4 4 4 3   In general, how would you rate your physical health? 2 3 3 3 3   In general, how would you rate your mental health, including your mood and your ability to think? 2 3 3 3 3   In general, how would you rate your satisfaction with your social activities and relationships? 3 3 3 3 3   In general, please rate how well you carry out your usual social activities and roles. (This includes activities at home, at work and in your community, and responsibilities as a parent, child, spouse, employee, friend, etc.) 3 4 4 3 3   To what extent are you able to carry out your everyday physical activities such as walking, climbing stairs, carrying groceries, or moving a chair? 5 5 5 5 5   In the past 7 days, how often have you been bothered by emotional problems such as feeling anxious, depressed, or irritable? 1 2 2 1 2   In the past 7 days, how would you rate your fatigue on average? 2 2 2 1 1   In the past 7 days, how would you rate your pain on average, where 0 means no pain, and 10 means worst imaginable pain? 0 0 0 1 1   Global Mental Health Score 13 14 14 15 13   Global Physical Health Score 16 17 17 17 17   PROMIS TOTAL - SUBSCORES 29 31 31 32 30   Some recent data might be hidden         ASSESSMENT: Current Emotional / Mental Status (status of significant symptoms):   Risk status (Self / Other harm or suicidal ideation)   Patient denies current fears or concerns for personal safety.   Patient denies current or recent suicidal ideation or behaviors.   Patient denies current or recent homicidal ideation or behaviors.   Patient denies current or recent self injurious behavior or ideation.   Patient denies other safety concerns.   Patient reports there has been no change in risk factors since their last session.     Patient reports there has been  no change in protective factors since their last session.     Recommended that patient call 911 or go to the local ED should there be a change in any of these risk factors.     Appearance:   Appropriate    Eye Contact:   Good    Psychomotor Behavior: Normal    Attitude:   Cooperative  Interested Friendly Pleasant Attentive   Orientation:   All   Speech    Rate / Production: Normal/ Responsive    Volume:  Normal    Mood:    Anxious  Depressed  Normal   Affect:    Appropriate  Subdued  Worrisome    Thought Content:  Clear  Rumination    Thought Form:  Coherent  Goal Directed  Logical    Insight:    Good  and Intellectual Insight     Medication Review:   No changes to current psychiatric medication(s)     Medication Compliance:   Yes     Changes in Health Issues:   None reported     Chemical Use Review:   Substance Use: Chemical use reviewed, no active concerns identified      Tobacco Use: No current tobacco use.      Diagnosis:  1. Major depressive disorder, recurrent, in partial remission (H)        Collateral Reports Completed:   Not Applicable    PLAN: (Patient Tasks / Therapist Tasks / Other)  Patient agreed to work on contemplating reaching out to others for advice/ regarding reporting his professional acquaintance.        Chetan Fitch, [facility] 9/16/22                                                         ______________________________________________________________________    Individual Treatment Plan    Patient's Name: Klebo J Skjervold David  YOB: 2000    Date of Creation: 2/24/22  Date Treatment Plan Last Reviewed/Revised: 9/16/22, next due 12/16/22    DSM5 Diagnoses: 296.35 (F33.41)  Major Depressive Disorder, Recurrent Episode, In partial remission _  Psychosocial / Contextual Factors: .  Cultural influences and impact on patient's life structure, values, norms, and healthcare: very in volved in [place].  Contextual influences on patient's health include: Individual  Factors patient has Stickler's Syndrome/hearing loss/heart blockage and Family Factors grandfather diagnosed with cancer when patient was 10, lived with him for support for six months before his death.  PROMIS (reviewed every 90 days): 31    Referral / Collaboration:  Referral to another professional/service is not indicated at this time..    Anticipated number of session for this episode of care: 11-20  Anticipation frequency of session: Weekly  Anticipated Duration of each session: 38-52 minutes  Treatment plan will be reviewed in 90 days or when goals have been changed.       MeasurableTreatment Goal(s) related to diagnosis / functional impairment(s)  Goal 1: Patient will improve overall baseline mood by 2 points on a 1-10 Likert scale per self-report (10 = optimal mood).    I will know I've met my goal when I feel better/less depressed overall and have improved my life structure/organization.       Objective #A (Client Action)                Status: Continued - Date:  9/16/22     Patient will Identify negative self-talk and behaviors: challenge core beliefs, myths, and actions.     Intervention(s)  Therapist will teach emotional regulation skills. CBT/REBT ABCD model.     Objective #B  Patient will Increase interest, engagement, and pleasure in doing things  Decrease frequency and intensity of feeling down, depressed, hopeless  Improve concentration, focus, and mindfulness in daily activities .                 Status: Continued - Date:  9/16/22     Intervention(s)  Therapist will teach emotional regulation skills. DBT Core Mindfulness, Distress Tolerance, Emotion Regulation, and Interpersonal Effetiveness skills.     Patient has reviewed and agreed to the above plan.      Chetan Fitch, LMFT  September 16, 2022

## 2022-11-18 ENCOUNTER — TELEPHONE (OUTPATIENT)
Dept: PSYCHOLOGY | Facility: CLINIC | Age: 22
End: 2022-11-18

## 2022-11-19 ENCOUNTER — HEALTH MAINTENANCE LETTER (OUTPATIENT)
Age: 22
End: 2022-11-19

## 2022-12-16 ENCOUNTER — VIRTUAL VISIT (OUTPATIENT)
Dept: PSYCHOLOGY | Facility: CLINIC | Age: 22
End: 2022-12-16
Payer: COMMERCIAL

## 2022-12-16 DIAGNOSIS — F33.41 MAJOR DEPRESSIVE DISORDER, RECURRENT, IN PARTIAL REMISSION (H): Primary | ICD-10-CM

## 2022-12-16 PROCEDURE — 99207 PR NO BILLABLE SERVICE THIS VISIT: CPT | Performed by: MARRIAGE & FAMILY THERAPIST

## 2022-12-16 ASSESSMENT — ANXIETY QUESTIONNAIRES
6. BECOMING EASILY ANNOYED OR IRRITABLE: NOT AT ALL
1. FEELING NERVOUS, ANXIOUS, OR ON EDGE: NOT AT ALL
4. TROUBLE RELAXING: NOT AT ALL
IF YOU CHECKED OFF ANY PROBLEMS ON THIS QUESTIONNAIRE, HOW DIFFICULT HAVE THESE PROBLEMS MADE IT FOR YOU TO DO YOUR WORK, TAKE CARE OF THINGS AT HOME, OR GET ALONG WITH OTHER PEOPLE: NOT DIFFICULT AT ALL
8. IF YOU CHECKED OFF ANY PROBLEMS, HOW DIFFICULT HAVE THESE MADE IT FOR YOU TO DO YOUR WORK, TAKE CARE OF THINGS AT HOME, OR GET ALONG WITH OTHER PEOPLE?: NOT DIFFICULT AT ALL
GAD7 TOTAL SCORE: 0
7. FEELING AFRAID AS IF SOMETHING AWFUL MIGHT HAPPEN: NOT AT ALL
5. BEING SO RESTLESS THAT IT IS HARD TO SIT STILL: NOT AT ALL
GAD7 TOTAL SCORE: 0
GAD7 TOTAL SCORE: 0
2. NOT BEING ABLE TO STOP OR CONTROL WORRYING: NOT AT ALL
3. WORRYING TOO MUCH ABOUT DIFFERENT THINGS: NOT AT ALL
7. FEELING AFRAID AS IF SOMETHING AWFUL MIGHT HAPPEN: NOT AT ALL

## 2022-12-16 NOTE — PROGRESS NOTES
[facility]                                     Progress Note    Patient Name: Klebo J Skjervold David  Date: 12/16/22         Service Type: Individual      Session Start Time: 2:01  Session End Time: 2:14     Session Length: 13    Session #: 18    Attendees: Client attended alone    Service Modality:  Video Visit:      Telemedicine Visit: The patient's condition can be safely assessed and treated via synchronous audio and visual telemedicine encounter.      Reason for Telemedicine Visit: Services only offered telehealth    Originating Site (Patient Location): Patient's other vehicle    Distant Site (Provider Location): Provider Remote Setting- Home Office    Consent:  The patient/guardian has verbally consented to: the potential risks and benefits of telemedicine (video visit) versus in person care; bill my insurance or make self-payment for services provided; and responsibility for payment of non-covered services.     Mode of Communication:  Video Conference via [employer]    As the provider I attest to compliance with applicable laws and regulations related to telemedicine.    DATA  Interactive Complexity: No  Crisis: No        Progress Since Last Session (Related to Symptoms / Goals / Homework):   Symptoms: Improving continued minimal anxiety/depression.    Homework: Partially completed      Episode of Care Goals: Satisfactory progress - ACTION (Actively working towards change); Intervened by reinforcing change plan / affirming steps taken     Current / Ongoing Stressors and Concerns:   Patient reported experiencing  minimal ongoing anxiety and depression lately related to life stressors.  Patient reported regarding getting a new job and school going well, and opted for short session and to continue monthly check-ins for the time.     Treatment Objective(s) Addressed in This Session:   use at least some coping skills for anxiety management in the next few weeks   Patient identified his  desired continued therapy goals.     Intervention:   DBT: reviewed with patient being effective in his responses to stressors, focusing on doing what works/what the situations calls for.  Motivational Interviewing: reviewed with patient his desired continued therapy goals.    Assessments completed prior to visit:  The following assessments were completed by patient for this visit:  PHQ9:   PHQ-9 SCORE 4/22/2022 6/3/2022 6/9/2022 7/21/2022 8/5/2022 9/15/2022 12/16/2022   PHQ-9 Total Score - - - - - - -   PHQ-9 Total Score MyChart 0 0 0 0 0 0 0   PHQ-9 Total Score 0 0 0 0 0 0 0     GAD7:   PETRA-7 SCORE 12/10/2021 12/28/2021 2/24/2022 3/11/2022 6/3/2022 7/21/2022 12/16/2022   Total Score 0 (minimal anxiety) 0 (minimal anxiety) 0 (minimal anxiety) 0 (minimal anxiety) 0 (minimal anxiety) 0 (minimal anxiety) 0 (minimal anxiety)   Total Score 0 0 0 0 0 0 0     PROMIS 10-Global Health (all questions and answers displayed):   PROMIS 10 12/10/2021 3/11/2022 3/22/2022 7/21/2022 8/5/2022 12/16/2022   In general, would you say your health is: Good Very good Good Good Good Good   In general, would you say your quality of life is: Good Very good Very good Very good Good Very good   In general, how would you rate your physical health? Fair Good Good Good Good Good   In general, how would you rate your mental health, including your mood and your ability to think? Fair Good Good Good Good Good   In general, how would you rate your satisfaction with your social activities and relationships? Good Good Good Good Good Very good   In general, please rate how well you carry out your usual social activities and roles Good Very good Very good Good Good Very good   To what extent are you able to carry out your everyday physical activities such as walking, climbing stairs, carrying groceries, or moving a chair? Completely Completely Completely Completely Completely Completely   How often have you been bothered by emotional problems such as  feeling anxious, depressed or irritable? Never Rarely Rarely Never Rarely Never   How would you rate your fatigue on average? Mild Mild Mild None None None   How would you rate your pain on average?   0 = No Pain  to  10 = Worst Imaginable Pain 0 0 0 1 1 0   In general, would you say your health is: 3 4 3 3 3 3   In general, would you say your quality of life is: 3 4 4 4 3 4   In general, how would you rate your physical health? 2 3 3 3 3 3   In general, how would you rate your mental health, including your mood and your ability to think? 2 3 3 3 3 3   In general, how would you rate your satisfaction with your social activities and relationships? 3 3 3 3 3 4   In general, please rate how well you carry out your usual social activities and roles. (This includes activities at home, at work and in your community, and responsibilities as a parent, child, spouse, employee, friend, etc.) 3 4 4 3 3 4   To what extent are you able to carry out your everyday physical activities such as walking, climbing stairs, carrying groceries, or moving a chair? 5 5 5 5 5 5   In the past 7 days, how often have you been bothered by emotional problems such as feeling anxious, depressed, or irritable? 1 2 2 1 2 1   In the past 7 days, how would you rate your fatigue on average? 2 2 2 1 1 1   In the past 7 days, how would you rate your pain on average, where 0 means no pain, and 10 means worst imaginable pain? 0 0 0 1 1 0   Global Mental Health Score 13 14 14 15 13 16   Global Physical Health Score 16 17 17 17 17 18   PROMIS TOTAL - SUBSCORES 29 31 31 32 30 34   Some recent data might be hidden         ASSESSMENT: Current Emotional / Mental Status (status of significant symptoms):   Risk status (Self / Other harm or suicidal ideation)   Patient denies current fears or concerns for personal safety.   Patient denies current or recent suicidal ideation or behaviors.   Patient denies current or recent homicidal ideation or behaviors.   Patient  denies current or recent self injurious behavior or ideation.   Patient denies other safety concerns.   Patient reports there has been no change in risk factors since their last session.     Patient reports there has been no change in protective factors since their last session.     Recommended that patient call 911 or go to the local ED should there be a change in any of these risk factors.     Appearance:   Appropriate    Eye Contact:   Good    Psychomotor Behavior: Normal    Attitude:   Cooperative  Interested Friendly Pleasant Attentive   Orientation:   All   Speech    Rate / Production: Normal/ Responsive    Volume:  Normal    Mood:    Normal   Affect:    Appropriate  Bright  Subdued    Thought Content:  Clear    Thought Form:  Coherent  Goal Directed  Logical    Insight:    Good      Medication Review:   No changes to current psychiatric medication(s)     Medication Compliance:   Yes     Changes in Health Issues:   None reported     Chemical Use Review:   Substance Use: Chemical use reviewed, no active concerns identified      Tobacco Use: No current tobacco use.      Diagnosis:  1. Major depressive disorder, recurrent, in partial remission (H)        Collateral Reports Completed:   Not Applicable    PLAN: (Patient Tasks / Therapist Tasks / Other)  Patient agreed to work on being effective in his responses to stressors, focusing on doing what works/what the situations calls for.        Chetan Fitch, LMFT 12/16/22                                                         ______________________________________________________________________    Individual Treatment Plan    Patient's Name: Klebo J Skjervold David  YOB: 2000    Date of Creation: 2/24/22  Date Treatment Plan Last Reviewed/Revised: 12/16/22, next due 3/16/23    DSM5 Diagnoses: 296.35 (F33.41)  Major Depressive Disorder, Recurrent Episode, In partial remission _  Psychosocial / Contextual Factors: .  Cultural  influences and impact on patient's life structure, values, norms, and healthcare: very in volved in [place].  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  PROMIS (reviewed every 90 days): 31    Referral / Collaboration:  Referral to another professional/service is not indicated at this time..    Anticipated number of session for this episode of care: 11-20  Anticipation frequency of session: Monthly  Anticipated Duration of each session: 38-52 minutes  Treatment plan will be reviewed in 90 days or when goals have been changed.       MeasurableTreatment Goal(s) related to diagnosis / functional impairment(s)  Goal 1: Patient will improve overall baseline mood by 2 points on a 1-10 Likert scale per self-report (10 = optimal mood).    I will know I've met my goal when I feel better/less depressed overall and have improved my life structure/organization.       Objective #A (Client Action)                Status: Continued - Date:  12/16/22     Patient will Identify negative self-talk and behaviors: challenge core beliefs, myths, and actions.     Intervention(s)  Therapist will teach emotional regulation skills. CBT/REBT ABCD model.     Objective #B  Patient will Increase interest, engagement, and pleasure in doing things  Decrease frequency and intensity of feeling down, depressed, hopeless  Improve concentration, focus, and mindfulness in daily activities .                 Status: Continued - Date:  12/16/22     Intervention(s)  Therapist will teach emotional regulation skills. DBT Core Mindfulness, Distress Tolerance, Emotion Regulation, and Interpersonal Effetiveness skills.     Patient has reviewed and agreed to the above plan.      Chetan Fitch, LMFT  December 16, 2022

## 2023-09-10 ENCOUNTER — HEALTH MAINTENANCE LETTER (OUTPATIENT)
Age: 23
End: 2023-09-10

## 2023-12-14 ENCOUNTER — OFFICE VISIT (OUTPATIENT)
Dept: CARDIOLOGY | Facility: CLINIC | Age: 23
End: 2023-12-14
Payer: COMMERCIAL

## 2023-12-14 VITALS
SYSTOLIC BLOOD PRESSURE: 145 MMHG | DIASTOLIC BLOOD PRESSURE: 80 MMHG | HEIGHT: 68 IN | BODY MASS INDEX: 36.09 KG/M2 | WEIGHT: 238.1 LBS | RESPIRATION RATE: 20 BRPM | OXYGEN SATURATION: 100 % | HEART RATE: 75 BPM

## 2023-12-14 DIAGNOSIS — Q89.8 STICKLER SYNDROME: Primary | ICD-10-CM

## 2023-12-14 DIAGNOSIS — R94.31 ABNORMAL ELECTROCARDIOGRAM: ICD-10-CM

## 2023-12-14 PROCEDURE — 99213 OFFICE O/P EST LOW 20 MIN: CPT | Performed by: PEDIATRICS

## 2023-12-14 NOTE — PROGRESS NOTES
"                                               PEDS Cardiac Consult Letter  Date: 2023      Enrike Neal MD  [address] [facility] [facility] [place] [address]      PATIENT: Klebo J Skjervold David  :          2000   ELIANE:          2023    Dear Dr. Neal:    Mendy is 23 years old and was seen at the [facility] on 2023.   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  He remains completely asymptomatic with no palpitations, chest pain or syncope.  He is followed by psychology and is on Vyvanse.  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    On physical examination his height was 1.733 m (5' 8.23\") and his weight was 108 kg (238 lb 1.6 oz).  His heart rate was 75  and respirations 20 per minute.  The blood pressure in his right arm was 145/80.  He was acyanotic, warm and well perfused. He was alert cooperative and in no distress.  His lungs were clear to auscultation without respiratory distress.  He had a regular rhythm with no murmur.  The second heart sound was physiologically split with a normal pulmonary component.   There was no organomegaly or abdominal tenderness.  Peripheral pulses were 2+ and equal in all extremities.  There was no clubbing or edema.    An electrocardiogram performed today that I personally reviewed and explained to him showed first-degree heart block with a TN interval of 218 ms.  A 24-hour ECG monitor performed 2021 that I personally reviewed and explained to him was normal with no prolonged pauses.    Mendy has stable first-degree heart block with Stickler syndrome.  He has not experienced any palpitations or syncope.  I recommend that he be seen again in 3 years with an electrocardiogram, unless he develops a change in symptoms in the interim.  He does not need activity restrictions because of his heart.    Thank you very much for " your confidence in allowing me to participate in Klebo's care.  If you have any questions or concerns, please don't hesitate to contact me.    Sincerely,      Aaron Pagan M.D.   Pediatric Cardiology   [facility]  P[facility] Special[facility]  [phone]    Note: Chart documentation done in part with Dragon Voice Recognition software. Although reviewed after completion, some word and grammatical errors may remain.

## 2023-12-14 NOTE — PATIENT INSTRUCTIONS
Thank you for choosing [facility]. It was a pleasure to see you for your office visit today.     If you have any questions or scheduling needs during regular office hours, please call: [phone]  If urgent concerns arise after hours, you can call [phone] and ask to speak to the pediatric specialist on call.   If you need to schedule Imaging/Radiology tests, please call: [phone]  Anipipo messages are for routine communication and questions and are usually answered within 48-72 hours. If you have an urgent concern or require sooner response, please call us.  Outside lab and imaging results should be faxed to [phone].  If you go to a lab outside of [facility] we will not automatically get those results. You will need to ask to have them faxed.   You may receive a survey regarding your experience with the clinic today. We would appreciate your feedback.   We encourage to you make your follow-up today to ensure a timely appointment. If you are unable to do so please reach out to [phone] as soon as possible.       If you had any blood work, imaging or other tests completed today:  Normal test results will be mailed to your home address in a letter.  Abnormal results will be communicated to you via phone call/letter.  Please allow up to 1-2 weeks for processing and interpretation of most lab work.

## 2024-11-01 ENCOUNTER — TRANSFERRED RECORDS (OUTPATIENT)
Dept: MULTI SPECIALTY CLINIC | Facility: CLINIC | Age: 24
End: 2024-11-01

## 2024-11-01 LAB — RETINOPATHY: NORMAL

## 2024-11-03 ENCOUNTER — HEALTH MAINTENANCE LETTER (OUTPATIENT)
Age: 24
End: 2024-11-03

## 2024-11-04 ASSESSMENT — PATIENT HEALTH QUESTIONNAIRE - PHQ9: SUM OF ALL RESPONSES TO PHQ QUESTIONS 1-9: 10

## 2025-02-01 ENCOUNTER — MYC MEDICAL ADVICE (OUTPATIENT)
Dept: CARDIOLOGY | Facility: CLINIC | Age: 25
End: 2025-02-01
Payer: COMMERCIAL

## 2025-04-16 ENCOUNTER — TELEPHONE (OUTPATIENT)
Dept: FAMILY MEDICINE | Facility: CLINIC | Age: 25
End: 2025-04-16
Payer: COMMERCIAL

## 2025-04-16 NOTE — TELEPHONE ENCOUNTER
Reason for Call:  Appointment Request    Patient requesting this type of appt:  re-establish care-with DR. Neal- has not been seen in over 3 years- needs referral to dermatology    Requested provider:  Jena    Reason patient unable to be scheduled: Needs to be scheduled by clinic    When does patient want to be seen/preferred time:  as soon as possible- off work on April 22, 23- or mid /late morning of April 24th-can do in person or virtual    Comments:     Could we send this information to you in [employer] or would you prefer to receive a phone call?:   Patient would prefer a phone call   Okay to leave a detailed message?: Yes at Cell number on file:    Telephone Information:   Mobile [phone]       Call taken on 4/16/2025 at 2:48 PM by Zenaida MARTINEZ

## 2025-04-17 NOTE — TELEPHONE ENCOUNTER
Called patient and made appointment for 04/28/2025. Nothing else needed.         Benita LIMA CMA ([facility])

## 2025-05-05 ENCOUNTER — TELEPHONE (OUTPATIENT)
Dept: DERMATOLOGY | Facility: CLINIC | Age: 25
End: 2025-05-05
Payer: COMMERCIAL

## 2025-05-05 ENCOUNTER — PATIENT OUTREACH (OUTPATIENT)
Dept: CARE COORDINATION | Facility: CLINIC | Age: 25
End: 2025-05-05
Payer: COMMERCIAL

## 2025-05-05 NOTE — TELEPHONE ENCOUNTER
Health Call Center    Phone Message    May a detailed message be left on voicemail: yes     Reason for Call: Patient has a referral for HS - not from a dermatologist but patient's mother is seen by Santhosh cage and she was told to have patient come in for HS - mother is Karin Skjervold - please call back [phone]    Action Taken: Message routed to:  [facility] Derm Rheum    Travel Screening: Not Applicable     Date of Service:

## 2025-05-07 ENCOUNTER — PATIENT OUTREACH (OUTPATIENT)
Dept: CARE COORDINATION | Facility: CLINIC | Age: 25
End: 2025-05-07
Payer: COMMERCIAL

## 2025-05-14 NOTE — TELEPHONE ENCOUNTER
Please call patient with an update if Dr. Trevizo will see him or not despite his referral not coming from a dermatologist. Thank you.

## 2025-06-19 ENCOUNTER — RESULTS FOLLOW-UP (OUTPATIENT)
Dept: DERMATOLOGY | Facility: CLINIC | Age: 25
End: 2025-06-19

## 2025-06-19 ENCOUNTER — LAB (OUTPATIENT)
Dept: LAB | Facility: CLINIC | Age: 25
End: 2025-06-19
Payer: COMMERCIAL

## 2025-06-19 ENCOUNTER — OFFICE VISIT (OUTPATIENT)
Dept: DERMATOLOGY | Facility: CLINIC | Age: 25
End: 2025-06-19
Attending: DERMATOLOGY
Payer: COMMERCIAL

## 2025-06-19 VITALS
HEART RATE: 81 BPM | WEIGHT: 241 LBS | DIASTOLIC BLOOD PRESSURE: 75 MMHG | SYSTOLIC BLOOD PRESSURE: 145 MMHG | HEIGHT: 68 IN | BODY MASS INDEX: 36.53 KG/M2

## 2025-06-19 DIAGNOSIS — L73.2 HIDRADENITIS SUPPURATIVA: ICD-10-CM

## 2025-06-19 LAB
EST. AVERAGE GLUCOSE BLD GHB EST-MCNC: 105 MG/DL
HBA1C MFR BLD: 5.3 % (ref 0–5.6)

## 2025-06-19 PROCEDURE — 99213 OFFICE O/P EST LOW 20 MIN: CPT | Performed by: DERMATOLOGY

## 2025-06-19 PROCEDURE — 99204 OFFICE O/P NEW MOD 45 MIN: CPT | Performed by: DERMATOLOGY

## 2025-06-19 PROCEDURE — 3078F DIAST BP <80 MM HG: CPT | Performed by: DERMATOLOGY

## 2025-06-19 PROCEDURE — 1126F AMNT PAIN NOTED NONE PRSNT: CPT | Performed by: DERMATOLOGY

## 2025-06-19 PROCEDURE — 3077F SYST BP >= 140 MM HG: CPT | Performed by: DERMATOLOGY

## 2025-06-19 RX ORDER — RESORCINOL
POWDER (GRAM) MISCELLANEOUS
Qty: 30 G | Refills: 11 | Status: SHIPPED | OUTPATIENT
Start: 2025-06-19

## 2025-06-19 RX ORDER — DOXYCYCLINE 100 MG/1
100 CAPSULE ORAL 2 TIMES DAILY
Qty: 28 CAPSULE | Refills: 11 | Status: SHIPPED | OUTPATIENT
Start: 2025-06-19 | End: 2025-06-19

## 2025-06-19 RX ORDER — BENZOYL PEROXIDE 10 G/100G
SUSPENSION TOPICAL
Qty: 187 ML | Refills: 11 | Status: SHIPPED | OUTPATIENT
Start: 2025-06-19

## 2025-06-19 RX ORDER — RESORCINOL
POWDER (GRAM) MISCELLANEOUS
Qty: 30 G | Refills: 11 | Status: SHIPPED | OUTPATIENT
Start: 2025-06-19 | End: 2025-06-19

## 2025-06-19 RX ORDER — CLINDAMYCIN PHOSPHATE 10 MG/G
GEL TOPICAL 2 TIMES DAILY
Qty: 60 G | Refills: 11 | Status: SHIPPED | OUTPATIENT
Start: 2025-06-19

## 2025-06-19 RX ORDER — DOXYCYCLINE 100 MG/1
100 CAPSULE ORAL 2 TIMES DAILY
Qty: 28 CAPSULE | Refills: 5 | Status: SHIPPED | OUTPATIENT
Start: 2025-06-19 | End: 2025-07-03

## 2025-06-19 RX ORDER — VIT C/B6/B5/MAGNESIUM/HERB 173 50-5-6-5MG
2000 CAPSULE ORAL DAILY
Qty: 360 CAPSULE | Refills: 4 | Status: SHIPPED | OUTPATIENT
Start: 2025-06-19 | End: 2025-09-17

## 2025-06-19 ASSESSMENT — PAIN SCALES - GENERAL: PAINLEVEL_OUTOF10: NO PAIN (0)

## 2025-06-19 NOTE — PROGRESS NOTES
"[facility] Dermatology Note  Encounter Date: Jun 19, 2025  Office Visit   Start Time: 11:58 AM  End Time: 12:46 PM    Dermatology Problem List:  1. HS  2. Stickler syndrome (vision problems, like nearsightedness and retinal detachment and hearing loss)  3. 1st deg heart block  ____________________________________________    Assessment & Plan:  # HS.   - Anil 1, x4 years  - Benzoyl peroxide 4-10% daily or CLN  - Flare plan   Mild to moderate flare: Resorcinol 15% in vaseline twice a day   +  Mod-severe: Doxycycline 100mg twice a day for 2 weeks. Watch out for sun sensitivity and GI issues. Take with food, not dairy.    - The longitudinal plan of care for the diagnosis(es)/condition(s) as documented were addressed during this visit. Due to the added complexity in care, I will continue to support Mendy in the subsequent management and with ongoing continuity of care.     Follow-up: 6 months virtual    Staff and Scribe:   Davy Trevizo MD, FAAD, FACP     Departments of Internal Medicine and Dermatology  [facility]    Scribe Disclosure:   I, Rafael Vinson, am serving as a scribe; to document services personally performed by Davy Trevizo MD -based on data collection and the provider's statements to me.       ____________________________________________    CC:   Chief Complaint   Patient presents with    Consult For      HIDRADENITIS SUPPURA     HPI:  Mr. Klebo J Skjervold David is a(n) 25 year old male who presents today as a new patient for possible HS.     Does not have an official diagnosis for HS.   Mother has HS and has tried the reosrcinol which has been helpful.     Gets a painful swollen \"thing\" in the arm pit 2 years, ago, 2 on belt line and one on chin a week ago.     Over past 6 months has had 2 on belt line, one on back and one on chin.    Screenings  - Depression/Anxiety:  +  - Sexual dysfunction:  No  - Tobacco use: +, trying to " "quit.  - PCOS:  NA  - Obesity: BMI 36.8  - DMII or Insulin Resistance:  - Hypertension: No  - Hyperlipidemia: No  - Inflammatory bowel disease: No. BM 1-2x a day. No blood in stool or mucous  - Spondyloarthropathy:  No.     Patient is otherwise feeling well, without additional skin concerns.  HS Nurse Assessment        6/14/2025    10:48 PM 6/19/2025    11:16 AM   Nurse Assessment Data   Over the past 30 days how many old lesions flared back up?  1   Over the past 30 days how many new lesions did you get?  0   Over the past week, how many dressing changes do you do each day?  0   Over the past week, has your wound drainage been:  Mild   Rate your HS overall from 0 - 10 (0 = no disease, 10 = worst) over the past week:   4   Rate your pain score from 0 - 10 (0 = no disease, 10 = worst) for the most painful/symptomatic lesion in the past week:   4   Over the past week, how much has HS influenced your quality of life?  slightly   Total DLQI Score 10 (moderate effect on patient's life)        Physical Exam:  Vitals: BP (!) 145/75   Pulse 81   Ht 1.734 m (5' 8.25\")   Wt 109.3 kg (241 lb)   BMI 36.38 kg/m      Total skin excluding the undergarment areas was performed. The exam included the head/face, neck, both arms, chest, back, abdomen, both legs, digits and/or nails.   HS Data      6/19/2025    12:10 PM   HS Exam Data   LC Type LC3   Clinical Subtypes Frictional type   Acne? No   Dissecting Cellulitis? No   Total Ma Stage I       Comments: 1 Inflammatory nodule on right jaw line  Mild KP on the back of the arms and follicular papules  Few acne formed papules on back  Hyperpigmented scar on belt line  Mild hyperpigmented plaque on bilateral groin       - No other lesions of concern on areas examined.     Medications:  Current Outpatient Medications   Medication Sig Dispense Refill    benzoyl peroxide (PANOXYL) 10 % external liquid Leave on for 30 seconds, then wash off 187 mL 11    cetirizine HCl 10 MG CAPS Take " 1 capsule by mouth daily as needed Needs to be seen b/4 further refills for allergies 90 capsule 4    clindamycin (CLEOCIN-T) 1 % external gel Apply topically 2 times daily. 60 g 11    doxycycline monohydrate (MONODOX) 100 MG capsule Take 1 capsule (100 mg) by mouth 2 times daily for 14 days. 28 capsule 5    Melatonin 10 MG TABS tablet Take 10 mg by mouth nightly as needed for sleep       Resorcinol POWD Resorcinol 15% in vaseline twice a day 30 g 11    Turmeric 500 MG CAPS Take 4 capsules (2,000 mg) by mouth daily. 360 capsule 4     No current facility-administered medications for this visit.      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 30 percent loss in both ears    Innocent heart murmur     Myopia     Retinal degeneration     Retinal detachment     Stickler syndrome         CC Enrike Neal MD  [address]  [place] [address] on close of [place] [address] on close of this encounter.

## 2025-06-19 NOTE — PATIENT INSTRUCTIONS
PLAN  1) HS   - Benzoyl peroxide 4-10% daily or CLN (over the counter, ordered)  - Consider tumeric 2000mg daily (over the counter, ordered)  - Flare plan   Mild to moderate flare: Resorcinol 15% in vaseline twice a day    [facility]  Address: [address], [place] [address]  Phone: [phone]    +  Mod-severe: Doxycycline 100mg twice a day for 2 weeks. Watch out for sun sensitivity and GI issues. Take with food, not dairy.     2) Erythrasma (rash on thighs)  - Clindamycion gel twice a day for for 2 weeks for thighs (can use this on pimple-like things too)    HIDRADENITIS SUPPURATIVA     What is hidradenitis suppurativa (HS)?  Hidradenitis suppurativa (HS) is a chronic skin disorder affecting the hair follicles. The disease starts with sore red lumps (or boils), typically involving the armpits, breasts, lower abdomen, groin, and buttocks. These lesions appear suddenly, increase in size and then burst or rupture, usually under the surface of the skin. This causes severe pain. Sometimes they rupture to the surface of the skin, draining pus. In some people, they can result in tunnels under the skin that may or may not continue to drain. When the lumps heal, they can leave scars.     Facts:   HS is a chronic skin disease, that comes and goes in flares, and is very painful  HS happens in many people - men and women, young and elderly, all races and ethnicities. But HS is more common in young adults, women  and skin of color  One out of three people with HS has a family member with HS   HS is NOT due to an infection or washing habits  HS is NOT contagious, so it cannot be spread from one person to another person   HS is NOT caused by how well you wash or what soaps you use  HS is NOT caused by smoking or obesity, but quitting smoking and losing weight have helped some people        Causes  The cause of HS remains unclear. It is thought that there is a problem in the hair follicle that makes it weaker and  easier to break open. The current theory is that there are three steps that cause an HS lesion to form::   The hair follicle gets plugged   The hair follicle swells and then ruptures, causing pain and inflammation   In some people, the inflammation does not stop and results in tunneling through the skin       Associated Conditions  HS is associated with several other medical conditions and activities including:  Tobacco use  High cholesterol, heart disease and stroke   Type 2 diabetes   Depression and anxiety   Arthritis, which causes stiffness and pain in joints   Inflammatory bowel disease (including Crohn's disease and ulcerative colitis)  Severe acne and pilonidal sinus/cyst  Polycystic ovarian syndrome  Skin cancer in areas of longstanding HS lesions, typically in the groin or buttocks (rare)    It is important to ask your physician to watch out for these conditions.      To help manage mental health issues related to HS and emotional support:  Help finding a mental health specialist: https://www.psychologyUpdater.Shopistan/us/therapists  Suicide prevention (24/7 free confidential support):   Phone: (698) 887-1053  Website: https://suicidepreventionlifeline.org/    Support groups:    Hope for HS: www.hopeforhs.org  HS Connect: www.hsconnect.org    HS Patient Support Application for your phone: Papaya (developed in collaboration with patient and community HS advocacy groups)    Intimacy support: American Association of Sexuality Educators, Counselors and Therapists (AASECT) website: https://www.aasect.org      For help quitting smoking   Phone:  English: 3-159-ITRU-NOW (1-714.633.7639)  Russian: 9-914-GINOOT-EMERSON (1-922.182.3240)    Website:   English: https://smokefree.gov/  Russian: lashawn.smokefree.gov     Lifestyle Modifications   Quitting smoking or weight loss can help your overall health and may help improve HS symptoms in some people, but not everyone.   It is recommended to eat a well-balanced, healthy diet  (https://health.gov/dietaryguidelines) and to maintain a healthy weight. Avoiding dietary triggers can help some people suffering with HS, but will not necessarily help others with HS.    Some people may find that friction, irritation, and rubbing may worsen HS symptoms. Some people do better with loose, breathable clothing and fabrics. Shaving close to the affected areas may also worsen HS in some people. But, not everyone has the same triggers for their HS, so see what works for you!    Some medications may worsen HS such as lithium, testosterone, and some progesterone-only birth control.  Please discuss this with your provider before stopping medication.     Zinc supplementation has been shown in some studies to help HS. However, every treatment can have a side effects,  so talk to your provider before starting any treatment.     Treatment                    Medicines, procedures and surgeries are offered to treat HS. Treatment can help reduce breakouts and improve quality of life.  Medicines used:  Topical washes (e.g. chlorhexidine, benzoyl peroxide)    Clindamycin on the skin - seems effective for pustules and papules. Probably not helpful for larger chronic lesions.     Oral antibiotics (e.g. doxycycline, clindamycin + rifampin, dapsone) - antibiotics may help some, but not all patients    Hormonal therapies (e.g. spironolactone, oral contraceptives) - primarily used in females though to have a hormonal component to their HS (e.g perimenstrual flares, worsening in pregnancy, polycystic ovarian syndrome)    Immunosuppression (e.g. prednisone)     Injectables (e.g. adalimumab, secukinumab, bimekizumab, infliximab) - Adalimumab, Secukinumab, Bimekziumab are the only federal drug administration (FDA) approved medication for HS  Education for adalimumab injections: https://www.Trusted Insight.com/humira-complete/injection  Adalimumab abassador program:   Website: https://www.humira.com/humira-complete/sign-up/  Phone:  1.800.4HUMIRA (6.199.325.4323)    Procedures:  Intralesional steroid injections - may help areas of acute active inflammation     Some hair removal lasers - If considering this option, we recommend doing this only with a medical professional that has experience treating HS.     Surgeries:  Incision and drainage - Provides fast, short-term relief, but symptoms likely to recur.        Deroofing - This surgery involves opening the lesion and cleaning out the base. This treatment is effective for recurring lesions.  Recurrence rates seem to be similar to excision. These are typically left open and allowed to heal on their own over 1-3 months      Excision - This involves cutting out chronic lesions.  This may entail cutting out a large affected area referred to as wide local excision, or cutting out single lesions, referred to as localized excisions.  Excision may be done with a scalpel, electric heating device or laser (e.g. carbon dioxide [CO2] laser).  These are either allowed to heal on their own, closed with sutures, or closed with a graft or flap.  Grafts are typically done by taking skin from one site of the body and using it to close another part of the body.  Flaps are done by moving tissue around to close a wound.     Check out this website to help decide the best treatment options for you!     https://www.informed-decisions.org/hidradenitispda.php

## 2025-06-19 NOTE — Clinical Note
"6/19/2025       RE: Klebo J Skjervold David  [address]  [place] [address]     Dear Colleague,    Thank you for referring your patient, Klebo J Skjervold David, to the [facility] [place] at [facility]. Please see a copy of my visit note below.    [facility] Dermatology Note  Encounter Date: Jun 19, 2025  Office Visit   Start Time: ***  End Time: ***    Dermatology Problem List:  1. HS  2. Stickler syndrome (vision problems, like nearsightedness and retinal detachment and hearing loss)  3. 1st deg heart block  ____________________________________________    Assessment & Plan:  # HS.   - Ma 1, x4 years  - Benzoyl peroxide 4-10% daily or CLN  - Flare plan   Mild to moderate flare: Resorcinol 15% in vaseline twice a day   +  Mod-severe: Doxycycline 100mg twice a day for 2 weeks. Watch out for sun sensitivity and GI issues. Take with food, not dairy.    - The longitudinal plan of care for the diagnosis(es)/condition(s) as documented were addressed during this visit. Due to the added complexity in care, I will continue to support Mendy in the subsequent management and with ongoing continuity of care.     Follow-up: 6 months vitual    Staff and Scribe:   Davy Trevizo MD, FAAD, FACP     Departments of Internal Medicine and Dermatology  [facility]    ____________________________________________    CC:   Chief Complaint   Patient presents with    Consult For      HIDRADENITIS SUPPURA     HPI:  Mr. Klebo J Skjervold David is a(n) 25 year old male who presents today as a new patient for possible HS.     Does not have an official diagnosis for HS.   Mother has HS and has tried the reosrcinol which has been helpful.     Gets a painful swollen \"thing\" in the arm pit 2 years, ago, 2 on belt line and one on chin a week ago.     Over past 6 months has had 2 on belt line, one on back and " "one on chin.    Screenings  - Depression/Anxiety:  +  - Sexual dysfunction:  No  - Tobacco use: +, trying to quit.  - PCOS:  NA  - Obesity: BMI 36.8  - DMII or Insulin Resistance:  - Hypertension: No  - Hyperlipidemia: No  - Inflammatory bowel disease: No. BM 1-2x a day. No blood in stool or mucous  - Spondyloarthropathy:  No.     Patient is otherwise feeling well, without additional skin concerns.  HS Nurse Assessment        6/14/2025    10:48 PM 6/19/2025    11:16 AM   Nurse Assessment Data   Over the past 30 days how many old lesions flared back up?  1   Over the past 30 days how many new lesions did you get?  0   Over the past week, how many dressing changes do you do each day?  0   Over the past week, has your wound drainage been:  Mild   Rate your HS overall from 0 - 10 (0 = no disease, 10 = worst) over the past week:   4   Rate your pain score from 0 - 10 (0 = no disease, 10 = worst) for the most painful/symptomatic lesion in the past week:   4   Over the past week, how much has HS influenced your quality of life?  slightly   Total DLQI Score 10 (moderate effect on patient's life)        Physical Exam:  Vitals: BP (!) 145/75   Pulse 81   Ht 1.734 m (5' 8.25\")   Wt 109.3 kg (241 lb)   BMI 36.38 kg/m        - No other lesions of concern on areas examined.     Medications:  Current Outpatient Medications   Medication Sig Dispense Refill    cetirizine HCl 10 MG CAPS Take 1 capsule by mouth daily as needed Needs to be seen b/4 further refills for allergies 90 capsule 4    Melatonin 10 MG TABS tablet Take 10 mg by mouth nightly as needed for sleep        No current facility-administered medications for this visit.      Past Medical History:   Patient Active Problem List   Diagnosis    ADHD: inattentive subtype    Innocent heart murmur    Abnormal electrocardiogram    Stickler syndrome    Cataract, mild, ou    Retinal degeneration    HL (hearing loss)    Moderate major depression (H)    Lattice degeneration of " peripheral retina - hx of laser, ou (PQ)    Sleep disorder, circadian, delayed sleep phase type    PETRA (generalized anxiety disorder)     Past Medical History:   Diagnosis Date    ADHD (attention deficit hyperactivity disorder)     Allergic rhinitis     Cataracts     Coronary artery disease     1st degree heart block & innocent heart murmur    Depressive disorder     Hearing loss     related to stickler's syndrome. 30 percent loss in both ears    Innocent heart murmur     Myopia     Retinal degeneration     Retinal detachment     Stickler syndrome         CC Enrike Neal MD  [address]  [place] [address] on close of this encounter.        Again, thank you for allowing me to participate in the care of your patient.      Sincerely,    Davy Trevizo MD

## 2025-06-19 NOTE — NURSING NOTE
"Chief Complaint   Patient presents with    Consult For      HIDRADENITIS SUPPURA     BP (!) 145/75   Pulse 81   Ht 1.734 m (5' 8.25\")   Wt 109.3 kg (241 lb)   BMI 36.38 kg/m    Patricia Gamino MA on 6/19/2025 at 11:15 AM    "

## 2025-06-21 LAB — ZINC SERPL-MCNC: 94.3 UG/DL
